# Patient Record
Sex: FEMALE | URBAN - METROPOLITAN AREA
[De-identification: names, ages, dates, MRNs, and addresses within clinical notes are randomized per-mention and may not be internally consistent; named-entity substitution may affect disease eponyms.]

---

## 2019-06-11 LAB
EXT 24 HR UR METANEPHRINE: ABNORMAL
EXT 24 HR UR NORMETANEPHRINE: ABNORMAL
EXT 24 HR UR NORMETANEPHRINE: ABNORMAL
EXT 25 HYDROXY VIT D2: ABNORMAL
EXT 25 HYDROXY VIT D3: ABNORMAL
EXT 5 HIAA 24 HR URINE: ABNORMAL
EXT 5 HIAA BLOOD: ABNORMAL
EXT ACTH: ABNORMAL
EXT AFP: ABNORMAL
EXT ALBUMIN: 4.5 G/DL (ref 3.4–5)
EXT ALKALINE PHOSPHATASE: 155 IU/L (ref 15–37)
EXT ALT: 16 IU/L (ref 14–59)
EXT AMYLASE: 75 IU/L (ref 25–115)
EXT ANTI ISLET CELL AB: ABNORMAL
EXT ANTI PARIETAL CELL AB: ABNORMAL
EXT ANTI THYROID AB: ABNORMAL
EXT AST: 16 IU/L (ref 15–37)
EXT BILIRUBIN DIRECT: ABNORMAL MG/DL
EXT BILIRUBIN TOTAL: 0.27 MG/DL (ref 0.2–1)
EXT BK VIRUS DNA QN PCR: ABNORMAL
EXT BUN: 18 MG/DL (ref 7–18)
EXT C PEPTIDE: ABNORMAL
EXT CA 125: ABNORMAL
EXT CA 19-9: ABNORMAL
EXT CA 27-29: ABNORMAL
EXT CALCITONIN: ABNORMAL
EXT CALCIUM: 9.7 MG/DL (ref 8.5–10.1)
EXT CEA: ABNORMAL
EXT CHLORIDE: 104 MEQ/L (ref 98–107)
EXT CHOLESTEROL: ABNORMAL
EXT CHROMOGRANIN A: ABNORMAL
EXT CO2: 16.8 MEQ/L (ref 21–32)
EXT CREATININE UA: ABNORMAL
EXT CREATININE: 0.9 MG/DL (ref 0.55–1.02)
EXT CYCLOSPORONE LEVEL: ABNORMAL
EXT DOPAMINE: ABNORMAL
EXT EBV DNA BY PCR: ABNORMAL
EXT EPINEPHRINE: ABNORMAL
EXT FOLATE: ABNORMAL
EXT FREE T3: ABNORMAL
EXT FREE T4: ABNORMAL
EXT FSH: ABNORMAL
EXT GASTRIN RELEASING PEPTIDE: ABNORMAL
EXT GASTRIN RELEASING PEPTIDE: ABNORMAL
EXT GASTRIN: ABNORMAL
EXT GGT: 4.1 G/DL (ref 1.2–2.3)
EXT GHRELIN: ABNORMAL
EXT GLUCAGON: ABNORMAL
EXT GLUCOSE: 167 MG/DL (ref 70–110)
EXT GROWTH HORMONE: ABNORMAL
EXT HCV RNA QUANT PCR: ABNORMAL
EXT HDL: ABNORMAL
EXT HEMATOCRIT: 37.7 % (ref 37–47)
EXT HEMOGLOBIN A1C: ABNORMAL %
EXT HEMOGLOBIN: 11.3 G/DL (ref 12–16)
EXT HISTAMINE 24 HR URINE: ABNORMAL
EXT HISTAMINE: ABNORMAL
EXT IGF-1: ABNORMAL
EXT IMMUNKNOW (NON-STIMULATED): ABNORMAL
EXT IMMUNKNOW (STIMULATED): ABNORMAL
EXT INR: ABNORMAL
EXT INSULIN: ABNORMAL
EXT LANREOTIDE LEVEL: ABNORMAL
EXT LDH, TOTAL: ABNORMAL
EXT LDL CHOLESTEROL: ABNORMAL
EXT LIPASE: 172 IU/L (ref 73–393)
EXT MAGNESIUM: ABNORMAL
EXT METANEPHRINE FREE PLASMA: ABNORMAL
EXT MOTILIN: ABNORMAL
EXT NEUROKININ A CAMB: ABNORMAL
EXT NEUROKININ A ISI: ABNORMAL
EXT NEUROTENSIN: ABNORMAL
EXT NOREPINEPHRINE: ABNORMAL
EXT NORMETANEPHRINE: ABNORMAL
EXT NSE: ABNORMAL
EXT OCTREOTIDE LEVEL: ABNORMAL
EXT PANCREASTATIN CAMB: ABNORMAL
EXT PANCREASTATIN ISI: ABNORMAL
EXT PANCREATIC POLYPEPTIDE: ABNORMAL
EXT PHOSPHORUS: ABNORMAL
EXT PLATELETS: 546 F/L (ref 8–12)
EXT POTASSIUM: 3.9 MEQ/L (ref 3.5–5.1)
EXT PROGRAF LEVEL: ABNORMAL
EXT PROLACTIN: ABNORMAL
EXT PROTEIN TOTAL: 8.6 G/DL (ref 6.4–8.2)
EXT PROTEIN UA: ABNORMAL
EXT PT: ABNORMAL
EXT PTH, INTACT: ABNORMAL
EXT PTT: ABNORMAL
EXT RAPAMUNE LEVEL: ABNORMAL
EXT SEROTONIN: ABNORMAL
EXT SODIUM: 138 MEQ/L (ref 136–145)
EXT SOMATOSTATIN: ABNORMAL
EXT SUBSTANCE P: ABNORMAL
EXT TRIGLYCERIDES: ABNORMAL
EXT TRYPTASE: ABNORMAL
EXT TSH: ABNORMAL
EXT URIC ACID: ABNORMAL
EXT URINE AMYLASE U/HR: ABNORMAL
EXT URINE AMYLASE U/L: ABNORMAL
EXT VASOACTIVE INTESTINAL POLYPEPTIDE: ABNORMAL
EXT VITAMIN B12: ABNORMAL
EXT VMA 24 HR URINE: ABNORMAL
EXT WBC: 13.97 K/UL (ref 4.8–10.8)
NEURON SPECIFIC ENOLASE: ABNORMAL

## 2019-07-30 ENCOUNTER — TELEPHONE (OUTPATIENT)
Dept: NEUROLOGY | Facility: HOSPITAL | Age: 44
End: 2019-07-30

## 2019-07-30 DIAGNOSIS — R10.84 GENERALIZED ABDOMINAL PAIN: ICD-10-CM

## 2019-07-30 NOTE — TELEPHONE ENCOUNTER
Contacted pt via phone after MD contacted Dr Fuchs directly. Pt with hx of gastric bypass, has jejunostomy in place, with continued absorption issues and bowel necrosis. Seeking surgical consult to evaluate, repair bowel. Pt desires tx, lives approx 4 hours away and is willing to travel for MRI and appt. Pt to fax operative reports and related records this afternoon or tomorrow.

## 2019-08-01 ENCOUNTER — TELEPHONE (OUTPATIENT)
Dept: NEUROLOGY | Facility: HOSPITAL | Age: 44
End: 2019-08-01

## 2019-08-01 DIAGNOSIS — R10.84 GENERALIZED ABDOMINAL PAIN: Primary | ICD-10-CM

## 2019-08-02 ENCOUNTER — HOSPITAL ENCOUNTER (OUTPATIENT)
Dept: RADIOLOGY | Facility: HOSPITAL | Age: 44
Discharge: HOME | End: 2019-08-02
Attending: SURGERY
Payer: COMMERCIAL

## 2019-08-02 ENCOUNTER — INFUSION (OUTPATIENT)
Dept: INFUSION THERAPY | Facility: HOSPITAL | Age: 44
End: 2019-08-02
Attending: SURGERY
Payer: COMMERCIAL

## 2019-08-02 DIAGNOSIS — R10.84 GENERALIZED ABDOMINAL PAIN: ICD-10-CM

## 2019-08-02 LAB
CREAT SERPL-MCNC: 0.6 MG/DL (ref 0.5–1.4)
EST. GFR  (AFRICAN AMERICAN): >60 ML/MIN/1.73 M^2
EST. GFR  (NON AFRICAN AMERICAN): >60 ML/MIN/1.73 M^2

## 2019-08-02 PROCEDURE — 72197 MRI PELVIS W/O & W/DYE: CPT | Mod: TC

## 2019-08-02 PROCEDURE — 74183 MRI ENTEROGRAPHY: ICD-10-PCS | Mod: 26,,, | Performed by: RADIOLOGY

## 2019-08-02 PROCEDURE — 25000003 PHARM REV CODE 250: Performed by: SURGERY

## 2019-08-02 PROCEDURE — 82565 ASSAY OF CREATININE: CPT

## 2019-08-02 PROCEDURE — A4216 STERILE WATER/SALINE, 10 ML: HCPCS | Performed by: SURGERY

## 2019-08-02 PROCEDURE — 36592 COLLECT BLOOD FROM PICC: CPT

## 2019-08-02 PROCEDURE — 72197 MRI PELVIS W/O & W/DYE: CPT | Mod: 26,,, | Performed by: RADIOLOGY

## 2019-08-02 PROCEDURE — 72197 MRI ENTEROGRAPHY: ICD-10-PCS | Mod: 26,,, | Performed by: RADIOLOGY

## 2019-08-02 PROCEDURE — 74183 MRI ABD W/O CNTR FLWD CNTR: CPT | Mod: 26,,, | Performed by: RADIOLOGY

## 2019-08-02 PROCEDURE — A9585 GADOBUTROL INJECTION: HCPCS | Performed by: SURGERY

## 2019-08-02 PROCEDURE — 25500020 PHARM REV CODE 255: Performed by: SURGERY

## 2019-08-02 RX ORDER — SODIUM CHLORIDE 0.9 % (FLUSH) 0.9 %
10 SYRINGE (ML) INJECTION
Status: DISCONTINUED | OUTPATIENT
Start: 2019-08-02 | End: 2019-08-02 | Stop reason: HOSPADM

## 2019-08-02 RX ORDER — GADOBUTROL 604.72 MG/ML
10 INJECTION INTRAVENOUS
Status: COMPLETED | OUTPATIENT
Start: 2019-08-02 | End: 2019-08-02

## 2019-08-02 RX ADMIN — Medication 10 ML: at 03:08

## 2019-08-02 RX ADMIN — GADOBUTROL 10 ML: 604.72 INJECTION INTRAVENOUS at 04:08

## 2019-08-08 ENCOUNTER — DOCUMENTATION ONLY (OUTPATIENT)
Dept: NEUROLOGY | Facility: HOSPITAL | Age: 44
End: 2019-08-08

## 2019-08-08 ENCOUNTER — HOSPITAL ENCOUNTER (INPATIENT)
Facility: HOSPITAL | Age: 44
LOS: 24 days | Discharge: HOME HEALTH | DRG: 264 | End: 2019-09-01
Attending: SURGERY | Admitting: SURGERY
Payer: COMMERCIAL

## 2019-08-08 DIAGNOSIS — E43 SEVERE PROTEIN-CALORIE MALNUTRITION: ICD-10-CM

## 2019-08-08 DIAGNOSIS — A41.51 SEPSIS DUE TO ESCHERICHIA COLI: ICD-10-CM

## 2019-08-08 DIAGNOSIS — B96.20 BACTEREMIA DUE TO ESCHERICHIA COLI: ICD-10-CM

## 2019-08-08 DIAGNOSIS — R78.81 GRAM-NEGATIVE BACTEREMIA: ICD-10-CM

## 2019-08-08 DIAGNOSIS — R19.7 DIARRHEA, UNSPECIFIED TYPE: ICD-10-CM

## 2019-08-08 DIAGNOSIS — R78.81 BACTEREMIA DUE TO ESCHERICHIA COLI: ICD-10-CM
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
## 2019-08-08 DIAGNOSIS — A41.9 SEPSIS, DUE TO UNSPECIFIED ORGANISM: ICD-10-CM

## 2019-08-08 DIAGNOSIS — R07.9 ACUTE CHEST PAIN: ICD-10-CM

## 2019-08-08 DIAGNOSIS — R10.84 GENERALIZED ABDOMINAL PAIN: ICD-10-CM

## 2019-08-08 DIAGNOSIS — A41.9 SEPSIS: ICD-10-CM

## 2019-08-08 DIAGNOSIS — K91.89 COMPLICATIONS OF GASTRIC BYPASS SURGERY: Primary | ICD-10-CM

## 2019-08-08 DIAGNOSIS — K90.829 SGS (SHORT GUT SYNDROME): ICD-10-CM

## 2019-08-08 PROBLEM — E46 PROTEIN CALORIE MALNUTRITION: Status: ACTIVE | Noted: 2019-08-08

## 2019-08-08 LAB
ALBUMIN SERPL BCP-MCNC: 1.9 G/DL (ref 3.5–5.2)
ALP SERPL-CCNC: 207 U/L (ref 55–135)
ALT SERPL W/O P-5'-P-CCNC: 38 U/L (ref 10–44)
ANION GAP SERPL CALC-SCNC: 8 MMOL/L (ref 8–16)
AST SERPL-CCNC: 23 U/L (ref 10–40)
BASOPHILS # BLD AUTO: 0.02 K/UL (ref 0–0.2)
BASOPHILS NFR BLD: 0.3 % (ref 0–1.9)
BILIRUB SERPL-MCNC: 0.2 MG/DL (ref 0.1–1)
BUN SERPL-MCNC: 10 MG/DL (ref 6–20)
CALCIUM SERPL-MCNC: 8.1 MG/DL (ref 8.7–10.5)
CHLORIDE SERPL-SCNC: 97 MMOL/L (ref 95–110)
CO2 SERPL-SCNC: 29 MMOL/L (ref 23–29)
CREAT SERPL-MCNC: 0.6 MG/DL (ref 0.5–1.4)
DIFFERENTIAL METHOD: ABNORMAL
EOSINOPHIL # BLD AUTO: 0.3 K/UL (ref 0–0.5)
EOSINOPHIL NFR BLD: 4.3 % (ref 0–8)
ERYTHROCYTE [DISTWIDTH] IN BLOOD BY AUTOMATED COUNT: 16.4 % (ref 11.5–14.5)
EST. GFR  (AFRICAN AMERICAN): >60 ML/MIN/1.73 M^2
EST. GFR  (NON AFRICAN AMERICAN): >60 ML/MIN/1.73 M^2
GLUCOSE SERPL-MCNC: 94 MG/DL (ref 70–110)
HCT VFR BLD AUTO: 25.5 % (ref 37–48.5)
HGB BLD-MCNC: 7.7 G/DL (ref 12–16)
LYMPHOCYTES # BLD AUTO: 1.7 K/UL (ref 1–4.8)
LYMPHOCYTES NFR BLD: 22.2 % (ref 18–48)
MAGNESIUM SERPL-MCNC: 2 MG/DL (ref 1.6–2.6)
MCH RBC QN AUTO: 22.8 PG (ref 27–31)
MCHC RBC AUTO-ENTMCNC: 30.2 G/DL (ref 32–36)
MCV RBC AUTO: 75 FL (ref 82–98)
MONOCYTES # BLD AUTO: 0.8 K/UL (ref 0.3–1)
MONOCYTES NFR BLD: 10.7 % (ref 4–15)
NEUTROPHILS # BLD AUTO: 4.6 K/UL (ref 1.8–7.7)
NEUTROPHILS NFR BLD: 62.5 % (ref 38–73)
PHOSPHATE SERPL-MCNC: 2.8 MG/DL (ref 2.7–4.5)
PLATELET # BLD AUTO: 473 K/UL (ref 150–350)
PMV BLD AUTO: 8.6 FL (ref 9.2–12.9)
POTASSIUM SERPL-SCNC: 3.4 MMOL/L (ref 3.5–5.1)
PREALB SERPL-MCNC: 13 MG/DL (ref 20–43)
PROT SERPL-MCNC: 6 G/DL (ref 6–8.4)
RBC # BLD AUTO: 3.38 M/UL (ref 4–5.4)
SODIUM SERPL-SCNC: 134 MMOL/L (ref 136–145)
WBC # BLD AUTO: 7.47 K/UL (ref 3.9–12.7)

## 2019-08-08 PROCEDURE — 63600175 PHARM REV CODE 636 W HCPCS: Performed by: STUDENT IN AN ORGANIZED HEALTH CARE EDUCATION/TRAINING PROGRAM

## 2019-08-08 PROCEDURE — 83735 ASSAY OF MAGNESIUM: CPT

## 2019-08-08 PROCEDURE — 84100 ASSAY OF PHOSPHORUS: CPT

## 2019-08-08 PROCEDURE — 85025 COMPLETE CBC W/AUTO DIFF WBC: CPT

## 2019-08-08 PROCEDURE — 84134 ASSAY OF PREALBUMIN: CPT

## 2019-08-08 PROCEDURE — 11000001 HC ACUTE MED/SURG PRIVATE ROOM

## 2019-08-08 PROCEDURE — 94761 N-INVAS EAR/PLS OXIMETRY MLT: CPT

## 2019-08-08 PROCEDURE — 80053 COMPREHEN METABOLIC PANEL: CPT

## 2019-08-08 RX ORDER — LANOLIN ALCOHOL/MO/W.PET/CERES
1000 CREAM (GRAM) TOPICAL DAILY
COMMUNITY

## 2019-08-08 RX ORDER — ACYCLOVIR 400 MG/1
400 TABLET ORAL 2 TIMES DAILY PRN
COMMUNITY

## 2019-08-08 RX ORDER — TRAMADOL HYDROCHLORIDE 50 MG/1
50 TABLET ORAL EVERY 6 HOURS PRN
Status: DISCONTINUED | OUTPATIENT
Start: 2019-08-08 | End: 2019-08-21

## 2019-08-08 RX ORDER — SODIUM CHLORIDE 0.9 % (FLUSH) 0.9 %
10 SYRINGE (ML) INJECTION
Status: DISCONTINUED | OUTPATIENT
Start: 2019-08-08 | End: 2019-09-01 | Stop reason: HOSPADM

## 2019-08-08 RX ORDER — ALUMINUM HYDROXIDE, MAGNESIUM HYDROXIDE, AND SIMETHICONE 2400; 240; 2400 MG/30ML; MG/30ML; MG/30ML
10 SUSPENSION ORAL EVERY 6 HOURS PRN
COMMUNITY

## 2019-08-08 RX ORDER — ACETAMINOPHEN 325 MG/1
650 TABLET ORAL EVERY 4 HOURS PRN
Status: DISCONTINUED | OUTPATIENT
Start: 2019-08-08 | End: 2019-08-24

## 2019-08-08 RX ORDER — LIDOCAINE HYDROCHLORIDE 10 MG/ML
1 INJECTION, SOLUTION EPIDURAL; INFILTRATION; INTRACAUDAL; PERINEURAL ONCE
Status: DISCONTINUED | OUTPATIENT
Start: 2019-08-08 | End: 2019-08-11

## 2019-08-08 RX ORDER — OXYCODONE HYDROCHLORIDE 5 MG/1
10 TABLET ORAL EVERY 4 HOURS PRN
Status: DISCONTINUED | OUTPATIENT
Start: 2019-08-08 | End: 2019-08-21

## 2019-08-08 RX ORDER — ACETAMINOPHEN 500 MG
1000 TABLET ORAL EVERY 6 HOURS PRN
COMMUNITY

## 2019-08-08 RX ORDER — ONDANSETRON HYDROCHLORIDE 8 MG/1
8 TABLET, FILM COATED ORAL 2 TIMES DAILY PRN
COMMUNITY

## 2019-08-08 RX ORDER — MORPHINE SULFATE 2 MG/ML
4 INJECTION, SOLUTION INTRAMUSCULAR; INTRAVENOUS EVERY 4 HOURS PRN
Status: DISCONTINUED | OUTPATIENT
Start: 2019-08-08 | End: 2019-08-08

## 2019-08-08 RX ORDER — HYDROMORPHONE HYDROCHLORIDE 2 MG/ML
1 INJECTION, SOLUTION INTRAMUSCULAR; INTRAVENOUS; SUBCUTANEOUS EVERY 4 HOURS PRN
Status: DISCONTINUED | OUTPATIENT
Start: 2019-08-08 | End: 2019-08-21

## 2019-08-08 RX ORDER — SUCRALFATE 1 G/10ML
1 SUSPENSION ORAL
COMMUNITY

## 2019-08-08 RX ORDER — ONDANSETRON 8 MG/1
8 TABLET, ORALLY DISINTEGRATING ORAL EVERY 8 HOURS PRN
Status: DISCONTINUED | OUTPATIENT
Start: 2019-08-08 | End: 2019-08-19

## 2019-08-08 RX ORDER — ACETAMINOPHEN 325 MG/1
650 TABLET ORAL EVERY 8 HOURS PRN
Status: DISCONTINUED | OUTPATIENT
Start: 2019-08-08 | End: 2019-08-24

## 2019-08-08 RX ORDER — CHLORDIAZEPOXIDE HYDROCHLORIDE AND CLIDINIUM BROMIDE 5; 2.5 MG/1; MG/1
1 CAPSULE ORAL
COMMUNITY

## 2019-08-08 RX ORDER — SODIUM CHLORIDE 9 MG/ML
INJECTION, SOLUTION INTRAVENOUS CONTINUOUS
Status: DISCONTINUED | OUTPATIENT
Start: 2019-08-08 | End: 2019-08-11

## 2019-08-08 RX ORDER — ZOLPIDEM TARTRATE 12.5 MG/1
12.5 TABLET, FILM COATED, EXTENDED RELEASE ORAL NIGHTLY
COMMUNITY

## 2019-08-08 RX ADMIN — SODIUM CHLORIDE: 0.9 INJECTION, SOLUTION INTRAVENOUS at 05:08

## 2019-08-08 RX ADMIN — HYDROMORPHONE HYDROCHLORIDE 1 MG: 2 INJECTION INTRAMUSCULAR; INTRAVENOUS; SUBCUTANEOUS at 07:08

## 2019-08-08 NOTE — NURSING
Referral from Dr. Lombardi & Dr. Mayes. Patient with hx of eddie-en-Y procedure for weight loss in 2006.  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  Patient has jejunostomy in place, with continued absorption issues and bowel necrosis. Seeking surgical consult to evaluate & repair bowel.  Last CT scan, 7/2019 from outside system.  MRI 8/2/19 at [facility].

## 2019-08-08 NOTE — NURSING
Dionisio from Lab at [facility] called to advise pt had blood cultures come back positive with gram negative rods. Dionisio sent over report; placed in chart.

## 2019-08-08 NOTE — H&P
[facility]  General Surgery  Neuroendocrine Tumor Service  History & Physical    Patient Name: Parvin Corbin  MRN: [id number]   Admission Date: 2019  Code Status: Full Code   Attending Provider: KANDIS Fuchs MD   Primary Care Physician: Cecilia Ross MD  Principal Problem: sepsis    Patient information was obtained from patient.     Subjective:     History of Present Illness: 43 y.o. woman transferred from OSH with abdominal pain and sepsis (GNR). 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  Hospital stay complicated by bilateral pulmonary embolisms. Patient reports having no issues with gastric bypass until episode of abdominal pain and resulting surgery last month. She now reports having a few days of abdominal pain with associated nausea/vomiting, fever/chills. Patient has been on TPN at home since discharge from hospital last month via PICC line. E PICC line removed yesterday due to suspected sepsis and new PICC placed to [facility].     Chief Complaint/Reason for Admission: sepsis, vomiting    No current facility-administered medications on file prior to encounter.      No current outpatient medications on file prior to encounter.       Review of patient's allergies indicates:  No Known Allergies    Past Medical History:   Diagnosis Date    ADD (attention deficit disorder)     Anxiety     Diabetes     Insomnia      Past Surgical History:   Procedure Laterality Date    CHOLECYSTECTOMY      ENDOMETRIAL ABLATION      EXPLORATORY LAPAROTOMY  2019    SBR -ischemic injury    GASTRIC BYPASS  2006    [place]  DELIVERY ONLY      , Low Cervical    TUBAL LIGATION       Family History     None        Tobacco Use    Smoking status: Never Smoker   Substance and Sexual Activity    Alcohol use:  Not Currently    Drug use: Never    Sexual activity: Not Currently     Review of Systems   Constitutional: Positive for appetite change and fever. Negative for chills.   HENT: Negative.  Negative for congestion, nosebleeds, rhinorrhea, sneezing, sore throat and trouble swallowing.    Eyes: Negative.    Respiratory: Negative.  Negative for cough, choking, chest tightness, shortness of breath and wheezing.    Cardiovascular: Negative.  Negative for chest pain and leg swelling.   Gastrointestinal: Positive for abdominal pain and vomiting. Negative for abdominal distention and blood in stool.   Genitourinary: Negative for dysuria.   Musculoskeletal: Negative for back pain and neck pain.   Neurological: Negative.  Negative for dizziness, syncope, weakness and headaches.   All other systems reviewed and are negative.    Objective:     Vital Signs (Most Recent):  Temp: 98 °F (36.7 °C) (08/08/19 1830)  Pulse: 100 (08/08/19 1830)  Resp: 18 (08/08/19 1830)  BP: 97/62 (08/08/19 1830)  SpO2: 98 % (08/08/19 1830) Vital Signs (24h Range):  Temp:  [98 °F (36.7 °C)-98.4 °F (36.9 °C)] 98 °F (36.7 °C)  Pulse:  [] 100  Resp:  [18] 18  SpO2:  [98 %] 98 %  BP: ()/(62-64) 97/62     Weight: 76.1 kg (167 lb 12.3 oz)  Body mass index is 27.92 kg/m².        Physical Exam   Constitutional: She is oriented to person, place, and time. She appears well-developed and well-nourished. No distress.   HENT:   Head: Atraumatic.   Mouth/Throat: No oropharyngeal exudate.   Eyes: Pupils are equal, round, and reactive to light. Conjunctivae are normal. No scleral icterus.   Neck: Neck supple. No JVD present. No thyromegaly present.   Cardiovascular: Normal rate, regular rhythm and intact distal pulses.   Pulmonary/Chest: Effort normal. No respiratory distress. She has no wheezes.   Abdominal: Soft. She exhibits no distension and no mass. There is tenderness. There is no guarding.   Jejunostomy to LUQ. Surrounding skin with superficial  erythema/irritation 2/2 stool exposure   Musculoskeletal: Normal range of motion. She exhibits no edema.   Neurological: She is alert and oriented to person, place, and time.   Skin: Skin is dry. Capillary refill takes less than 2 seconds. She is not diaphoretic.   Nursing note and vitals reviewed.    Significant Labs:  CBC:   Pending    CMP:   Pending     Assessment/Plan:     Active Diagnoses:    Diagnosis Date Noted POA    Sepsis [A41.9] 08/08/2019 Yes      Problems Resolved During this Admission:     · Transferring hospital lab reported blood cultures with gram negative rods, will start patient on zosyn  · CBC, CMP, Mg, PO4, prealbumin  · XR enteric contrast study with small bowel follow through tomorrow AM  · Diabetic diet, NPO at midnight  · Pain/nausea control PRN  · MIVF @ 125cc  · Restart home medications  · Hold TPN at this time due to sepsis    Patient seen and examined with attending surgeon Dr. Kirit De Los Snatos MD  General Surgery  Neuroendocrine Tumor Service  [facility]    Seen and examined  History reviewed  Physical done  S/p bowel resection for volvulus   May have short gut ssyndrome    Will evlauate

## 2019-08-08 NOTE — NURSING
Pt arrived to floor; vital signs taken and recorded. Patient and spouse oriented to room. Patient with complaints of pain. Advised her I would have to wait until they place orders.

## 2019-08-09 LAB
ALBUMIN SERPL BCP-MCNC: 1.8 G/DL (ref 3.5–5.2)
ALP SERPL-CCNC: 183 U/L (ref 55–135)
ALT SERPL W/O P-5'-P-CCNC: 34 U/L (ref 10–44)
ANION GAP SERPL CALC-SCNC: 7 MMOL/L (ref 8–16)
AST SERPL-CCNC: 23 U/L (ref 10–40)
BASOPHILS # BLD AUTO: 0.01 K/UL (ref 0–0.2)
BASOPHILS NFR BLD: 0.2 % (ref 0–1.9)
BILIRUB SERPL-MCNC: 0.2 MG/DL (ref 0.1–1)
BILIRUB UR QL STRIP: NEGATIVE
BUN SERPL-MCNC: 10 MG/DL (ref 6–20)
CALCIUM SERPL-MCNC: 7.7 MG/DL (ref 8.7–10.5)
CHLORIDE SERPL-SCNC: 100 MMOL/L (ref 95–110)
CLARITY UR: CLEAR
CO2 SERPL-SCNC: 28 MMOL/L (ref 23–29)
COLOR UR: YELLOW
CREAT SERPL-MCNC: 0.6 MG/DL (ref 0.5–1.4)
DIFFERENTIAL METHOD: ABNORMAL
EOSINOPHIL # BLD AUTO: 0.3 K/UL (ref 0–0.5)
EOSINOPHIL NFR BLD: 5 % (ref 0–8)
ERYTHROCYTE [DISTWIDTH] IN BLOOD BY AUTOMATED COUNT: 16.4 % (ref 11.5–14.5)
EST. GFR  (AFRICAN AMERICAN): >60 ML/MIN/1.73 M^2
EST. GFR  (NON AFRICAN AMERICAN): >60 ML/MIN/1.73 M^2
GLUCOSE SERPL-MCNC: 79 MG/DL (ref 70–110)
GLUCOSE UR QL STRIP: NEGATIVE
HCT VFR BLD AUTO: 24 % (ref 37–48.5)
HGB BLD-MCNC: 7.2 G/DL (ref 12–16)
HGB UR QL STRIP: NEGATIVE
KETONES UR QL STRIP: NEGATIVE
LEUKOCYTE ESTERASE UR QL STRIP: NEGATIVE
LYMPHOCYTES # BLD AUTO: 1.7 K/UL (ref 1–4.8)
LYMPHOCYTES NFR BLD: 26.7 % (ref 18–48)
MAGNESIUM SERPL-MCNC: 1.8 MG/DL (ref 1.6–2.6)
MCH RBC QN AUTO: 22.6 PG (ref 27–31)
MCHC RBC AUTO-ENTMCNC: 30 G/DL (ref 32–36)
MCV RBC AUTO: 76 FL (ref 82–98)
MONOCYTES # BLD AUTO: 0.8 K/UL (ref 0.3–1)
MONOCYTES NFR BLD: 12.6 % (ref 4–15)
NEUTROPHILS # BLD AUTO: 3.4 K/UL (ref 1.8–7.7)
NEUTROPHILS NFR BLD: 55.5 % (ref 38–73)
NITRITE UR QL STRIP: NEGATIVE
PH UR STRIP: 6 [PH] (ref 5–8)
PHOSPHATE SERPL-MCNC: 3.5 MG/DL (ref 2.7–4.5)
PLATELET # BLD AUTO: 456 K/UL (ref 150–350)
PMV BLD AUTO: 8.4 FL (ref 9.2–12.9)
POCT GLUCOSE: 120 MG/DL (ref 70–110)
POTASSIUM SERPL-SCNC: 3.8 MMOL/L (ref 3.5–5.1)
PROT SERPL-MCNC: 5.6 G/DL (ref 6–8.4)
PROT UR QL STRIP: NEGATIVE
RBC # BLD AUTO: 3.18 M/UL (ref 4–5.4)
SODIUM SERPL-SCNC: 135 MMOL/L (ref 136–145)
SP GR UR STRIP: <=1.005 (ref 1–1.03)
URN SPEC COLLECT METH UR: ABNORMAL
UROBILINOGEN UR STRIP-ACNC: NEGATIVE EU/DL
WBC # BLD AUTO: 6.18 K/UL (ref 3.9–12.7)

## 2019-08-09 PROCEDURE — 84100 ASSAY OF PHOSPHORUS: CPT

## 2019-08-09 PROCEDURE — 63600175 PHARM REV CODE 636 W HCPCS: Performed by: STUDENT IN AN ORGANIZED HEALTH CARE EDUCATION/TRAINING PROGRAM

## 2019-08-09 PROCEDURE — 81003 URINALYSIS AUTO W/O SCOPE: CPT

## 2019-08-09 PROCEDURE — 80053 COMPREHEN METABOLIC PANEL: CPT

## 2019-08-09 PROCEDURE — 25000003 PHARM REV CODE 250: Performed by: STUDENT IN AN ORGANIZED HEALTH CARE EDUCATION/TRAINING PROGRAM

## 2019-08-09 PROCEDURE — 94761 N-INVAS EAR/PLS OXIMETRY MLT: CPT

## 2019-08-09 PROCEDURE — 25000003 PHARM REV CODE 250: Performed by: SURGERY

## 2019-08-09 PROCEDURE — 83735 ASSAY OF MAGNESIUM: CPT

## 2019-08-09 PROCEDURE — S0028 INJECTION, FAMOTIDINE, 20 MG: HCPCS | Performed by: STUDENT IN AN ORGANIZED HEALTH CARE EDUCATION/TRAINING PROGRAM

## 2019-08-09 PROCEDURE — 11000001 HC ACUTE MED/SURG PRIVATE ROOM

## 2019-08-09 PROCEDURE — 85025 COMPLETE CBC W/AUTO DIFF WBC: CPT

## 2019-08-09 RX ORDER — HYDROCODONE BITARTRATE AND ACETAMINOPHEN 10; 325 MG/1; MG/1
1 TABLET ORAL EVERY 6 HOURS PRN
COMMUNITY

## 2019-08-09 RX ORDER — FAMOTIDINE 10 MG/ML
20 INJECTION INTRAVENOUS 2 TIMES DAILY
Status: DISCONTINUED | OUTPATIENT
Start: 2019-08-09 | End: 2019-08-15

## 2019-08-09 RX ORDER — ZOLPIDEM TARTRATE 5 MG/1
5 TABLET ORAL NIGHTLY PRN
Status: DISCONTINUED | OUTPATIENT
Start: 2019-08-09 | End: 2019-09-01 | Stop reason: HOSPADM

## 2019-08-09 RX ORDER — ENOXAPARIN SODIUM 100 MG/ML
50 INJECTION SUBCUTANEOUS EVERY 12 HOURS
Status: DISCONTINUED | OUTPATIENT
Start: 2019-08-09 | End: 2019-08-14

## 2019-08-09 RX ORDER — ACYCLOVIR 200 MG/1
400 CAPSULE ORAL 2 TIMES DAILY
Status: DISCONTINUED | OUTPATIENT
Start: 2019-08-09 | End: 2019-08-14

## 2019-08-09 RX ORDER — FAMOTIDINE 20 MG/1
20 TABLET, FILM COATED ORAL 2 TIMES DAILY
Status: DISCONTINUED | OUTPATIENT
Start: 2019-08-09 | End: 2019-08-09

## 2019-08-09 RX ORDER — ACYCLOVIR 400 MG/1
400 TABLET ORAL 2 TIMES DAILY
Status: DISCONTINUED | OUTPATIENT
Start: 2019-08-09 | End: 2019-08-09 | Stop reason: RX

## 2019-08-09 RX ORDER — OXANDROLONE 2.5 MG/1
2.5 TABLET ORAL 2 TIMES DAILY
Status: DISCONTINUED | OUTPATIENT
Start: 2019-08-09 | End: 2019-08-10

## 2019-08-09 RX ORDER — SUCRALFATE 1 G/10ML
1 SUSPENSION ORAL
Status: DISCONTINUED | OUTPATIENT
Start: 2019-08-09 | End: 2019-08-28

## 2019-08-09 RX ADMIN — FAMOTIDINE 20 MG: 10 INJECTION, SOLUTION INTRAVENOUS at 08:08

## 2019-08-09 RX ADMIN — OXYCODONE HYDROCHLORIDE 10 MG: 5 TABLET ORAL at 08:08

## 2019-08-09 RX ADMIN — FAMOTIDINE 20 MG: 10 INJECTION, SOLUTION INTRAVENOUS at 12:08

## 2019-08-09 RX ADMIN — ENOXAPARIN SODIUM 50 MG: 100 INJECTION SUBCUTANEOUS at 08:08

## 2019-08-09 RX ADMIN — ASCORBIC ACID, VITAMIN A PALMITATE, CHOLECALCIFEROL, THIAMINE HYDROCHLORIDE, RIBOFLAVIN-5 PHOSPHATE SODIUM, PYRIDOXINE HYDROCHLORIDE, NIACINAMIDE, DEXPANTHENOL, ALPHA-TOCOPHEROL ACETATE, VITAMIN K1, FOLIC ACID, BIOTIN, CYANOCOBALAMIN: 200; 3300; 200; 6; 3.6; 6; 40; 15; 10; 150; 600; 60; 5 INJECTION, SOLUTION INTRAVENOUS at 07:08

## 2019-08-09 RX ADMIN — ZOLPIDEM TARTRATE 5 MG: 5 TABLET ORAL at 08:08

## 2019-08-09 RX ADMIN — TRAMADOL HYDROCHLORIDE 50 MG: 50 TABLET, FILM COATED ORAL at 02:08

## 2019-08-09 RX ADMIN — HYDROMORPHONE HYDROCHLORIDE 1 MG: 2 INJECTION INTRAMUSCULAR; INTRAVENOUS; SUBCUTANEOUS at 02:08

## 2019-08-09 RX ADMIN — OXYCODONE HYDROCHLORIDE 10 MG: 5 TABLET ORAL at 06:08

## 2019-08-09 RX ADMIN — SUCRALFATE 1 G: 1 SUSPENSION ORAL at 12:08

## 2019-08-09 RX ADMIN — PIPERACILLIN AND TAZOBACTAM 4.5 G: 4; .5 INJECTION, POWDER, LYOPHILIZED, FOR SOLUTION INTRAVENOUS; PARENTERAL at 06:08

## 2019-08-09 RX ADMIN — SUCRALFATE 1 G: 1 SUSPENSION ORAL at 05:08

## 2019-08-09 RX ADMIN — HYDROMORPHONE HYDROCHLORIDE 1 MG: 2 INJECTION INTRAMUSCULAR; INTRAVENOUS; SUBCUTANEOUS at 09:08

## 2019-08-09 RX ADMIN — PIPERACILLIN AND TAZOBACTAM 4.5 G: 4; .5 INJECTION, POWDER, LYOPHILIZED, FOR SOLUTION INTRAVENOUS; PARENTERAL at 09:08

## 2019-08-09 RX ADMIN — SODIUM CHLORIDE: 0.9 INJECTION, SOLUTION INTRAVENOUS at 04:08

## 2019-08-09 RX ADMIN — HYDROMORPHONE HYDROCHLORIDE 1 MG: 2 INJECTION INTRAMUSCULAR; INTRAVENOUS; SUBCUTANEOUS at 12:08

## 2019-08-09 RX ADMIN — SUCRALFATE 1 G: 1 SUSPENSION ORAL at 08:08

## 2019-08-09 RX ADMIN — OXANDROLONE 2.5 MG: 2.5 TABLET ORAL at 08:08

## 2019-08-09 RX ADMIN — ACYCLOVIR 400 MG: 200 CAPSULE ORAL at 08:08

## 2019-08-09 RX ADMIN — ACYCLOVIR 400 MG: 200 CAPSULE ORAL at 12:08

## 2019-08-09 RX ADMIN — SODIUM CHLORIDE: 0.9 INJECTION, SOLUTION INTRAVENOUS at 01:08

## 2019-08-09 RX ADMIN — TRAMADOL HYDROCHLORIDE 50 MG: 50 TABLET, FILM COATED ORAL at 11:08

## 2019-08-09 NOTE — PLAN OF CARE
Problem: Adult Inpatient Plan of Care  Goal: Plan of Care Review  Outcome: Ongoing (interventions implemented as appropriate)  POC discussed with patient. NAD noted, patient c/o pain treated with prn with good effect. Patient self care with colostomy, recording amounts of output per patient report. IV fluids continued. IV medications administered. Patient NPO since midnight. Expected EGD for today. Patient encouraged to use call light to voice any needs. Will continue to monitor.

## 2019-08-09 NOTE — PROGRESS NOTES
Spoke with Mirian at [facility] to get pathology report sent to this facility.  Mirian stated she will fax the paperwork now.

## 2019-08-09 NOTE — PROGRESS NOTES
Pharmacy New Medication Education    Patient and/or Caregiver ACCEPTED medication education.    Pharmacy has provided education on the name, indication, and possible side effects of the medication(s) prescribed, using teach-back method.     Learners of pharmacy medication education includes:  patient    Medication Indication Side Effects   hydromorphone pain excessive sedation, nausea and vomiting   Piperacillin/tazobactam infection nausea, vomiting and diarrhea       The following medications have also been discussed, during this admission.     Current Facility-Administered Medications   Medication Frequency    0.9%  NaCl infusion Continuous    acetaminophen tablet 650 mg Q8H PRN    acetaminophen tablet 650 mg Q4H PRN    acyclovir capsule 400 mg BID    famotidine (PF) injection 20 mg BID    hydromorphone (PF) injection 1 mg Q4H PRN    lidocaine (PF) 10 mg/ml (1%) injection 10 mg Once    ondansetron disintegrating tablet 8 mg Q8H PRN    oxyCODONE immediate release tablet 10 mg Q4H PRN    piperacillin-tazobactam 4.5 g in dextrose 5 % 100 mL IVPB (ready to mix system) Q8H    sodium chloride 0.9% flush 10 mL PRN    sucralfate 100 mg/mL suspension 1 g QID (WM & HS)    traMADol tablet 50 mg Q6H PRN    zolpidem tablet 5 mg Nightly PRN          Thank you  Linda Giles, DavidD

## 2019-08-09 NOTE — PLAN OF CARE
TN met with pt and  Fabiano   [phone]    transferred from [facility]   current with [facility] and [facility] for home TPN     Chavo with [facility] informed -- referral sent through [facility]      pt here with sepsis; pmh eddie - y gastric bypass 13 yrs ago.     abd pain, chills, fever n/v.        08/09/19 1510   Discharge Assessment   Assessment Type Discharge Planning Assessment   Confirmed/corrected address and phone number on facesheet? Yes   Assessment information obtained from? Patient;Medical Record   Expected Length of Stay (days) 5   Communicated expected length of stay with patient/caregiver yes   Prior to hospitilization cognitive status: Alert/Oriented   Prior to hospitalization functional status: Independent   Current cognitive status: Alert/Oriented   Current Functional Status: Independent   Lives With spouse   Able to Return to Prior Arrangements yes   Is patient able to care for self after discharge? Unable to determine at this time (comments)   Patient's perception of discharge disposition home or selfcare;home health  (current [facility] and [facility] [place] )   Readmission Within the Last 30 Days   (transferred from [facility]   )   Patient currently being followed by outpatient case management? No   Patient currently receives any other outside agency services? No   Do you have any problems affording any of your prescribed medications? TBD  ([facility] )   Is the patient taking medications as prescribed? yes   Does the patient have transportation home? Yes   Transportation Anticipated family or friend will provide   Discharge Plan A Home;Home with family

## 2019-08-09 NOTE — PROGRESS NOTES
[facility]  General Surgery  Neuroendocrine Tumor Service  Progress Note    Subjective:   Hospital course: 43 y.o. woman transferred from OSH with abdominal pain and sepsis (GNR). 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  Hospital stay complicated by bilateral pulmonary embolisms. Patient reports having no issues with gastric bypass until episode of abdominal pain and resulting surgery last month. She now reports having a few days of abdominal pain with associated nausea/vomiting, fever/chills. Patient has been on TPN at home since discharge from hospital last month via PICC line. RUE PICC line removed yesterday due to suspected sepsis and new PICC placed to [facility].       Interval History: PAPITO, reports abdominal pain slightly improved, no nausea/vomiting, chest pain, SOB    Medications:  Continuous Infusions:   sodium chloride 0.9% 125 mL/hr at 08/09/19 0100     Scheduled Meds:   acyclovir  400 mg Oral BID    famotidine  20 mg Oral BID    lidocaine (PF) 10 mg/ml (1%)  1 mL Other Once    piperacillin-tazobactam (ZOSYN) IVPB  4.5 g Intravenous Q8H    sucralfate  1 g Oral QID (WM & HS)     PRN Meds:acetaminophen, acetaminophen, HYDROmorphone, ondansetron, oxyCODONE, sodium chloride 0.9%, traMADol, zolpidem     Objective:     Vital Signs (Most Recent):  Temp: 98.5 °F (36.9 °C) (08/09/19 0745)  Pulse: 82 (08/09/19 0745)  Resp: 18 (08/09/19 0745)  BP: 97/64 (08/09/19 0745)  SpO2: 96 % (08/09/19 0500) Vital Signs (24h Range):  Temp:  [97.8 °F (36.6 °C)-98.5 °F (36.9 °C)] 98.5 °F (36.9 °C)  Pulse:  [] 82  Resp:  [18-19] 18  SpO2:  [96 %-99 %] 96 %  BP: ()/(55-65) 97/64       Physical Exam   Constitutional: She appears well-developed and well-nourished. No distress.   HENT:   Head: Normocephalic and atraumatic.    Eyes: Conjunctivae are normal.   Neck: Neck supple.   Cardiovascular: Normal rate, regular rhythm and intact distal pulses.   Pulmonary/Chest: Effort normal. No respiratory distress.   Abdominal: Soft. She exhibits no distension. There is no rebound and no guarding.   Jejunostomy to LUQ with gas/liquid stool to bag   Skin: She is not diaphoretic.   Nursing note and vitals reviewed.    Significant Labs:  CBC:   Recent Labs   Lab 08/09/19  0634   WBC 6.18   RBC 3.18*   HGB 7.2*   HCT 24.0*   *   MCV 76*   MCH 22.6*   MCHC 30.0*     CMP:   Recent Labs   Lab 08/09/19  0634   GLU 79   CALCIUM 7.7*   ALBUMIN 1.8*   PROT 5.6*   *   K 3.8   CO2 28      BUN 10   CREATININE 0.6   ALKPHOS 183*   ALT 34   AST 23   BILITOT 0.2   MG 1.8   PHOS 3.5     Recent Labs   Lab 08/08/19  2307   PREALBUMIN 13*     Assessment/Plan:         Active Diagnoses:    Diagnosis Date Noted POA    PRINCIPAL PROBLEM:  Sepsis [A41.9] 08/08/2019 Yes    Protein calorie malnutrition [E46] 08/08/2019 Yes    SGS (short gut syndrome) [K91.2]  Yes      Problems Resolved During this Admission:     · NPO this morning, will review previously obtained imaging and determine usefulness of additional imaging studies  · Pain/nausea control PRN  · Accurate documentation intake/output  · Zosyn for GNR blood cultures from transferring hospital  · Will hold off on starting TPN until antibiotic therapy yields negative cultures  · Current workup towards reconnection of current alimentary tract vs small intestine transplant    Myles De Los Santos MD   [employer] General Surgery, PGY-2  08/09/2019

## 2019-08-09 NOTE — PLAN OF CARE
Problem: Adult Inpatient Plan of Care  Goal: Plan of Care Review  Outcome: Ongoing (interventions implemented as appropriate)  Pt on RA with documented sats.99. Will continue to monitor.

## 2019-08-10 LAB
ALBUMIN SERPL BCP-MCNC: 2 G/DL (ref 3.5–5.2)
ALP SERPL-CCNC: 182 U/L (ref 55–135)
ALT SERPL W/O P-5'-P-CCNC: 24 U/L (ref 10–44)
ANION GAP SERPL CALC-SCNC: 7 MMOL/L (ref 8–16)
ANISOCYTOSIS BLD QL SMEAR: SLIGHT
AST SERPL-CCNC: 13 U/L (ref 10–40)
B-HCG UR QL: NEGATIVE
BASOPHILS # BLD AUTO: ABNORMAL K/UL (ref 0–0.2)
BASOPHILS NFR BLD: 0 % (ref 0–1.9)
BILIRUB SERPL-MCNC: 0.2 MG/DL (ref 0.1–1)
BUN SERPL-MCNC: 7 MG/DL (ref 6–20)
CALCIUM SERPL-MCNC: 7.9 MG/DL (ref 8.7–10.5)
CHLORIDE SERPL-SCNC: 106 MMOL/L (ref 95–110)
CO2 SERPL-SCNC: 25 MMOL/L (ref 23–29)
CREAT SERPL-MCNC: 0.6 MG/DL (ref 0.5–1.4)
DIFFERENTIAL METHOD: ABNORMAL
EOSINOPHIL # BLD AUTO: ABNORMAL K/UL (ref 0–0.5)
EOSINOPHIL NFR BLD: 2 % (ref 0–8)
ERYTHROCYTE [DISTWIDTH] IN BLOOD BY AUTOMATED COUNT: 16.9 % (ref 11.5–14.5)
EST. GFR  (AFRICAN AMERICAN): >60 ML/MIN/1.73 M^2
EST. GFR  (NON AFRICAN AMERICAN): >60 ML/MIN/1.73 M^2
GLUCOSE SERPL-MCNC: 140 MG/DL (ref 70–110)
HCT VFR BLD AUTO: 24 % (ref 37–48.5)
HGB BLD-MCNC: 7.2 G/DL (ref 12–16)
HYPOCHROMIA BLD QL SMEAR: ABNORMAL
LYMPHOCYTES # BLD AUTO: ABNORMAL K/UL (ref 1–4.8)
LYMPHOCYTES NFR BLD: 16 % (ref 18–48)
MAGNESIUM SERPL-MCNC: 1.8 MG/DL (ref 1.6–2.6)
MCH RBC QN AUTO: 23 PG (ref 27–31)
MCHC RBC AUTO-ENTMCNC: 30 G/DL (ref 32–36)
MCV RBC AUTO: 77 FL (ref 82–98)
MONOCYTES # BLD AUTO: ABNORMAL K/UL (ref 0.3–1)
MONOCYTES NFR BLD: 4 % (ref 4–15)
NEUTROPHILS NFR BLD: 76 % (ref 38–73)
NEUTS BAND NFR BLD MANUAL: 2 %
OVALOCYTES BLD QL SMEAR: ABNORMAL
PHOSPHATE SERPL-MCNC: 3.4 MG/DL (ref 2.7–4.5)
PLATELET # BLD AUTO: 541 K/UL (ref 150–350)
PLATELET BLD QL SMEAR: ABNORMAL
PMV BLD AUTO: 8.4 FL (ref 9.2–12.9)
POCT GLUCOSE: 151 MG/DL (ref 70–110)
POIKILOCYTOSIS BLD QL SMEAR: SLIGHT
POLYCHROMASIA BLD QL SMEAR: ABNORMAL
POTASSIUM SERPL-SCNC: 3.4 MMOL/L (ref 3.5–5.1)
PROCALCITONIN SERPL IA-MCNC: 0.25 NG/ML
PROT SERPL-MCNC: 5.7 G/DL (ref 6–8.4)
RBC # BLD AUTO: 3.13 M/UL (ref 4–5.4)
SODIUM SERPL-SCNC: 138 MMOL/L (ref 136–145)
WBC # BLD AUTO: 5.98 K/UL (ref 3.9–12.7)

## 2019-08-10 PROCEDURE — 25000003 PHARM REV CODE 250: Performed by: STUDENT IN AN ORGANIZED HEALTH CARE EDUCATION/TRAINING PROGRAM

## 2019-08-10 PROCEDURE — 80053 COMPREHEN METABOLIC PANEL: CPT

## 2019-08-10 PROCEDURE — 63600175 PHARM REV CODE 636 W HCPCS: Performed by: STUDENT IN AN ORGANIZED HEALTH CARE EDUCATION/TRAINING PROGRAM

## 2019-08-10 PROCEDURE — 85007 BL SMEAR W/DIFF WBC COUNT: CPT

## 2019-08-10 PROCEDURE — 83735 ASSAY OF MAGNESIUM: CPT

## 2019-08-10 PROCEDURE — 97802 MEDICAL NUTRITION INDIV IN: CPT

## 2019-08-10 PROCEDURE — 11000001 HC ACUTE MED/SURG PRIVATE ROOM

## 2019-08-10 PROCEDURE — 81025 URINE PREGNANCY TEST: CPT

## 2019-08-10 PROCEDURE — 25000003 PHARM REV CODE 250: Performed by: SURGERY

## 2019-08-10 PROCEDURE — 87040 BLOOD CULTURE FOR BACTERIA: CPT

## 2019-08-10 PROCEDURE — 84100 ASSAY OF PHOSPHORUS: CPT

## 2019-08-10 PROCEDURE — 84145 PROCALCITONIN (PCT): CPT

## 2019-08-10 PROCEDURE — 94761 N-INVAS EAR/PLS OXIMETRY MLT: CPT

## 2019-08-10 PROCEDURE — S0028 INJECTION, FAMOTIDINE, 20 MG: HCPCS | Performed by: STUDENT IN AN ORGANIZED HEALTH CARE EDUCATION/TRAINING PROGRAM

## 2019-08-10 PROCEDURE — 85027 COMPLETE CBC AUTOMATED: CPT

## 2019-08-10 RX ORDER — OXANDROLONE 2.5 MG/1
10 TABLET ORAL 2 TIMES DAILY
Status: DISCONTINUED | OUTPATIENT
Start: 2019-08-10 | End: 2019-08-21

## 2019-08-10 RX ORDER — LOPERAMIDE HYDROCHLORIDE 2 MG/1
2 CAPSULE ORAL 3 TIMES DAILY
Status: DISCONTINUED | OUTPATIENT
Start: 2019-08-10 | End: 2019-08-21

## 2019-08-10 RX ORDER — CEFEPIME HYDROCHLORIDE 2 G/50ML
2 INJECTION, SOLUTION INTRAVENOUS
Status: DISCONTINUED | OUTPATIENT
Start: 2019-08-10 | End: 2019-08-15 | Stop reason: SDUPTHER

## 2019-08-10 RX ORDER — MAGNESIUM SULFATE HEPTAHYDRATE 40 MG/ML
2 INJECTION, SOLUTION INTRAVENOUS ONCE
Status: COMPLETED | OUTPATIENT
Start: 2019-08-10 | End: 2019-08-10

## 2019-08-10 RX ORDER — MORPHINE 10 MG/ML
6 TINCTURE ORAL 3 TIMES DAILY
Status: DISCONTINUED | OUTPATIENT
Start: 2019-08-10 | End: 2019-08-13

## 2019-08-10 RX ORDER — POTASSIUM CHLORIDE 7.45 MG/ML
10 INJECTION INTRAVENOUS
Status: COMPLETED | OUTPATIENT
Start: 2019-08-10 | End: 2019-08-10

## 2019-08-10 RX ADMIN — MORPHINE 6 MG: 10 SOLUTION ORAL at 03:08

## 2019-08-10 RX ADMIN — OXYCODONE HYDROCHLORIDE 10 MG: 5 TABLET ORAL at 01:08

## 2019-08-10 RX ADMIN — CEFTRIAXONE SODIUM 2 G: 2 INJECTION, POWDER, FOR SOLUTION INTRAMUSCULAR; INTRAVENOUS at 11:08

## 2019-08-10 RX ADMIN — SODIUM CHLORIDE: 0.9 INJECTION, SOLUTION INTRAVENOUS at 10:08

## 2019-08-10 RX ADMIN — POTASSIUM CHLORIDE 10 MEQ: 7.46 INJECTION, SOLUTION INTRAVENOUS at 03:08

## 2019-08-10 RX ADMIN — OXANDROLONE 2.5 MG: 2.5 TABLET ORAL at 08:08

## 2019-08-10 RX ADMIN — OXANDROLONE 10 MG: 2.5 TABLET ORAL at 10:08

## 2019-08-10 RX ADMIN — MORPHINE 6 MG: 10 SOLUTION ORAL at 10:08

## 2019-08-10 RX ADMIN — FAMOTIDINE 20 MG: 10 INJECTION, SOLUTION INTRAVENOUS at 10:08

## 2019-08-10 RX ADMIN — PIPERACILLIN AND TAZOBACTAM 4.5 G: 4; .5 INJECTION, POWDER, LYOPHILIZED, FOR SOLUTION INTRAVENOUS; PARENTERAL at 08:08

## 2019-08-10 RX ADMIN — ASCORBIC ACID, VITAMIN A PALMITATE, CHOLECALCIFEROL, THIAMINE HYDROCHLORIDE, RIBOFLAVIN-5 PHOSPHATE SODIUM, PYRIDOXINE HYDROCHLORIDE, NIACINAMIDE, DEXPANTHENOL, ALPHA-TOCOPHEROL ACETATE, VITAMIN K1, FOLIC ACID, BIOTIN, CYANOCOBALAMIN: 200; 3300; 200; 6; 3.6; 6; 40; 15; 10; 150; 600; 60; 5 INJECTION, SOLUTION INTRAVENOUS at 11:08

## 2019-08-10 RX ADMIN — POTASSIUM CHLORIDE 10 MEQ: 7.46 INJECTION, SOLUTION INTRAVENOUS at 01:08

## 2019-08-10 RX ADMIN — FAMOTIDINE 20 MG: 10 INJECTION, SOLUTION INTRAVENOUS at 08:08

## 2019-08-10 RX ADMIN — PIPERACILLIN AND TAZOBACTAM 4.5 G: 4; .5 INJECTION, POWDER, LYOPHILIZED, FOR SOLUTION INTRAVENOUS; PARENTERAL at 12:08

## 2019-08-10 RX ADMIN — SODIUM CHLORIDE: 0.9 INJECTION, SOLUTION INTRAVENOUS at 02:08

## 2019-08-10 RX ADMIN — SUCRALFATE 1 G: 1 SUSPENSION ORAL at 04:08

## 2019-08-10 RX ADMIN — POTASSIUM CHLORIDE 10 MEQ: 7.46 INJECTION, SOLUTION INTRAVENOUS at 04:08

## 2019-08-10 RX ADMIN — ENOXAPARIN SODIUM 50 MG: 100 INJECTION SUBCUTANEOUS at 08:08

## 2019-08-10 RX ADMIN — ACYCLOVIR 400 MG: 200 CAPSULE ORAL at 08:08

## 2019-08-10 RX ADMIN — POTASSIUM CHLORIDE 10 MEQ: 7.46 INJECTION, SOLUTION INTRAVENOUS at 02:08

## 2019-08-10 RX ADMIN — OXYCODONE HYDROCHLORIDE 10 MG: 5 TABLET ORAL at 07:08

## 2019-08-10 RX ADMIN — SUCRALFATE 1 G: 1 SUSPENSION ORAL at 11:08

## 2019-08-10 RX ADMIN — MAGNESIUM SULFATE IN WATER 2 G: 40 INJECTION, SOLUTION INTRAVENOUS at 12:08

## 2019-08-10 RX ADMIN — OCTREOTIDE ACETATE 250 MCG/HR: 1000 INJECTION, SOLUTION INTRAVENOUS; SUBCUTANEOUS at 11:08

## 2019-08-10 RX ADMIN — ENOXAPARIN SODIUM 50 MG: 100 INJECTION SUBCUTANEOUS at 10:08

## 2019-08-10 RX ADMIN — LOPERAMIDE HYDROCHLORIDE 2 MG: 2 CAPSULE ORAL at 03:08

## 2019-08-10 RX ADMIN — ACYCLOVIR 400 MG: 200 CAPSULE ORAL at 10:08

## 2019-08-10 RX ADMIN — SUCRALFATE 1 G: 1 SUSPENSION ORAL at 08:08

## 2019-08-10 RX ADMIN — LOPERAMIDE HYDROCHLORIDE 2 MG: 2 CAPSULE ORAL at 10:08

## 2019-08-10 RX ADMIN — POTASSIUM CHLORIDE 10 MEQ: 7.46 INJECTION, SOLUTION INTRAVENOUS at 12:08

## 2019-08-10 RX ADMIN — HYDROMORPHONE HYDROCHLORIDE 1 MG: 2 INJECTION INTRAMUSCULAR; INTRAVENOUS; SUBCUTANEOUS at 10:08

## 2019-08-10 RX ADMIN — OXYCODONE HYDROCHLORIDE 10 MG: 5 TABLET ORAL at 09:08

## 2019-08-10 RX ADMIN — CEFEPIME HYDROCHLORIDE 2 G: 2 INJECTION, POWDER, FOR SOLUTION INTRAVENOUS at 10:08

## 2019-08-10 RX ADMIN — SUCRALFATE 1 G: 1 SUSPENSION ORAL at 10:08

## 2019-08-10 NOTE — PLAN OF CARE
Problem: Adult Inpatient Plan of Care  Goal: Plan of Care Review  Outcome: Ongoing (interventions implemented as appropriate)  Pt safely maintained. TPN maintaned @50ml/h. NS @125.  Meds give per MAR. Pain management with oxycodon as ordered.  Care plan reviwied with patient.

## 2019-08-10 NOTE — PROGRESS NOTES
"[facility]  Adult Nutrition  Progress Note    SUMMARY       Recommendations    Recommendation:   1. Encourage intake at meals as tolerated.   2. Continue TPN for now.     Goals:   1. Pt will tolerate TPN.   2. Pt will tolerate po diet with at least 50% intake at meals.  Nutrition Goal Status: new  Communication of RD Recs: reviewed with RN    Reason for Assessment  Reason For Assessment: new TPN  Diagnosis: (sepsis)  Relevant Medical History: DM, anxiety, ADD, gastric bypass, expl lap, cholecystectomy  General Information Comments: Pt on Regular diet with Boost Plus & Boost pudding. +colostomy. Receiving Clinimix 5/15 at 50ml/hr. PICC line. Noted pt has been on home TPN for ~ 1 month. Reports tolerating po diet at this time. UBW ~180#. NFPE completed 8/10-mild wating of temple & clavicle.  Nutrition Discharge Planning: pt to d/c on Regular diet & possible TPN    Nutrition Risk Screen  Nutrition Risk Screen: tube feeding or parenteral nutrition    Nutrition/Diet History  Food Preferences: no Confucianist or cultural food prefs identified  Spiritual, Cultural Beliefs, Mu-ism Practices, Values that Affect Care: no  Factors Affecting Nutritional Intake: altered gastrointestinal function    Anthropometrics  Temp: 98.2 °F (36.8 °C)  Height Method: Stated  Height: 5' 5" (165.1 cm)  Height (inches): 65 in  Weight Method: Bed Scale  Weight: 76.4 kg (168 lb 6.9 oz)  Weight (lb): 168.43 lb  Ideal Body Weight (IBW), Female: 125 lb  % Ideal Body Weight, Female (lb): 134.74 lb  BMI (Calculated): 28.1  BMI Grade: 25 - 29.9 - overweight  Usual Body Weight (UBW), k.8 kg  % Usual Body Weight: 93.59  % Weight Change From Usual Weight: -6.6 %     Lab/Procedures/Meds  Pertinent Labs Reviewed: reviewed  Pertinent Labs Comments: K 3.4L, Glu 140H, Ca 7.9L, Alb 2.0L, PAB 13L  Pertinent Medications Reviewed: reviewed  Pertinent Medications Comments: rocephin, oxandrin    Estimated/Assessed Needs  Weight Used For " Calorie Calculations: 76.4 kg (168 lb 6.9 oz)  Energy Calorie Requirements (kcal): 1838  Energy Need Method: [place](x1.3)  Protein Requirements: 76g (1.0g/kg)  Weight Used For Protein Calculations: 76.4 kg (168 lb 6.9 oz)  Estimated Fluid Requirement Method: RDA Method  RDA Method (mL): 1838     Nutrition Prescription Ordered  Current Diet Order: Regular  Current Nutrition Support Formula Ordered: Clinimix E 5/15  Current Nutrition Support Rate Ordered: 50 (ml)  Current Nutrition Support Frequency Ordered: ml/hr  Oral Nutrition Supplement: Boost Plus, Boost pudding    Evaluation of Received Nutrient/Fluid Intake  Parenteral Calories (kcal): 852  Parenteral Protein (gm): 60  Parenteral Fluid (mL): 1200  GIR (Glucose Infusion Rate) (mg/kg/min): 1.6 mg/kg/min  % Kcal Needs: 46  % Protein Needs: 79  I/O: 4125/1435  Comments: LBM 8/9  % Intake of Estimated Energy Needs: 50 - 75 %  % Meal Intake: 0 - 25 %    Nutrition Risk  Level of Risk/Frequency of Follow-up: (2xweekly)     Assessment and Plan    Protein calorie malnutrition  Malnutrition in the context of Acute Illness/Injury    Related to (etiology):  S/p surgery/sepsis    Signs and Symptoms (as evidenced by):  Energy Intake: <50% of estimated energy requirement for 1 month  Muscle Mass Depletion: mild depletion of temples, clavicle region and scapular region   Weight Loss: 6% x 1 month     Interventions:  Parenteral nutrition  Commercial beverage    Nutrition Diagnosis Status:  New       Monitor and Evaluation  Food and Nutrient Intake: food and beverage intake, parenteral nutrition intake  Food and Nutrient Adminstration: diet order, enteral and parenteral nutrition administration  Physical Activity and Function: nutrition-related ADLs and IADLs  Anthropometric Measurements: weight  Biochemical Data, Medical Tests and Procedures: electrolyte and renal panel  Nutrition-Focused Physical Findings: overall appearance     Malnutrition Assessment  Malnutrition  Type: acute illness or injury  Weight Loss (Malnutrition): greater than 5% in 1 month   [place] Region (Muscle Loss): mild depletion  Clavicle Bone Region (Muscle Loss): mild depletion   Subcutaneous Fat Loss (Final Summary): well nourished  Muscle Loss Evaluation (Final Summary): mild protein-calorie malnutrition       Nutrition Follow-Up  RD Follow-up?: Yes

## 2019-08-10 NOTE — PROGRESS NOTES
GENERAL SURGERY  PROGRESS NOTE    Admit Date: 8/8/2019  Hospital Day: 2  Procedure:         SUBJECTIVE:      Patient is tolerating diet with no nausea or vomiting. Patient is doing well. Abdominal pain improving. Patient is ambulating with no issues. Denies fever, chills, chest pain or SOB.     Review of Systems   Cardiovascular: Negative for chest pain.   Respiratory: Negative for shortness of breath. Negative for cough .    Gastrointestinal: Negative for nausea and vomiting.      Continuous Infusions:   sodium chloride 0.9% 125 mL/hr at 08/10/19 0255    Amino acid 5% - dextrose 15% (CLINIMIX-E) solution with additives (1L  provides 510 kcal/L dextrose, with 50 gm AA, 150 gm CHO, Na 35, K 30, Mg 5, Ca 4.5, Acetate 80, Cl 39, Phos 15) 50 mL/hr at 08/09/19 1943     Scheduled Meds:   acyclovir  400 mg Oral BID    enoxaparin  50 mg Subcutaneous Q12H    famotidine (PF)  20 mg Intravenous BID    lidocaine (PF) 10 mg/ml (1%)  1 mL Other Once    oxandrolone  2.5 mg Oral BID    piperacillin-tazobactam (ZOSYN) IVPB  4.5 g Intravenous Q8H    sucralfate  1 g Oral QID (WM & HS)         OBJECTIVE:     Vital Signs (Most Recent)  Temp: 98 °F (36.7 °C) (08/10/19 0827)  Pulse: 86 (08/10/19 0827)  Resp: 20 (08/10/19 0827)  BP: 106/68 (08/10/19 0827)  SpO2: 99 % (08/10/19 0819)    Vital Signs Range (Last 24H):  Temp:  [97.7 °F (36.5 °C)-98.3 °F (36.8 °C)]   Pulse:  [78-99]   Resp:  [18-20]   BP: ()/(54-70)   SpO2:  [98 %-99 %]     I & O (Last 24H):    Intake/Output Summary (Last 24 hours) at 8/10/2019 0854  Last data filed at 8/10/2019 0443  Gross per 24 hour   Intake 4124.58 ml   Output 1435 ml   Net 2689.58 ml       Physical Exam:    Constitutional: She appears well-developed and well-nourished. No distress.   HENT:   Head: Normocephalic and atraumatic.   Eyes: Conjunctivae are normal.   Neck: Neck supple.   Cardiovascular: Normal rate, regular rhythm and intact distal pulses.   Pulmonary/Chest: Effort normal. No  respiratory distress.   Abdominal: Soft. She exhibits no distension. There is no rebound and no guarding.   Jejunostomy to LUQ with gas/liquid stool to bag   Skin: She is not diaphoretic.   Nursing note and vitals reviewed.      Inpatient Data      Vitals:   Temp:  [97.7 °F (36.5 °C)-98.3 °F (36.8 °C)]   Pulse:  [78-99]   Resp:  [18-20]   BP: ()/(54-70)   SpO2:  [98 %-99 %]     Diet Dysphagia Soft (IDDSI Level 6)  Amino acid 5% - dextrose 15% (CLINIMIX-E) solution with additives.  CENTRAL LINE ONLY (1395 mOsm/L). 1L provides 50 gm AA, 150 gm CHO (510 kcal/L dextrose), Na 35, K 30, Mg 5, Ca 4.5, Acetate 80, Cl 39, Phos 15.     Intake/Output Summary (Last 24 hours) at 8/10/2019 0854  Last data filed at 8/10/2019 0443  Gross per 24 hour   Intake 4124.58 ml   Output 1435 ml   Net 2689.58 ml          Recent Labs     08/08/19  2307 08/09/19  0634   WBC 7.47 6.18   HGB 7.7* 7.2*   HCT 25.5* 24.0*   * 456*   * 135*   K 3.4* 3.8   CL 97 100   CO2 29 28   BUN 10 10   CREATININE 0.6 0.6   BILITOT 0.2 0.2   AST 23 23   ALT 38 34   ALKPHOS 207* 183*   CALCIUM 8.1* 7.7*   ALBUMIN 1.9* 1.8*   PROT 6.0 5.6*   MG 2.0 1.8   PHOS 2.8 3.5     Scheduled Meds:   acyclovir 400 mg BID   enoxaparin 50 mg Q12H   famotidine (PF) 20 mg BID   lidocaine (PF) 10 mg/ml (1%) 1 mL Once   oxandrolone 2.5 mg BID   piperacillin-tazobactam (ZOSYN) IVPB 4.5 g Q8H   sucralfate 1 g QID (WM & HS)      sodium chloride 0.9% 125 mL/hr at 08/10/19 0255    Amino acid 5% - dextrose 15% (CLINIMIX-E) solution with additives (1L  provides 510 kcal/L dextrose, with 50 gm AA, 150 gm CHO, Na 35, K 30, Mg 5, Ca 4.5, Acetate 80, Cl 39, Phos 15) 50 mL/hr at 08/09/19 1943        Lines/Drains:       PICC Double Lumen right brachial (Active)   Site Assessment Clean;Dry;Intact 8/9/2019  8:11 PM   Lumen 1 Status Flushed;Infusing 8/9/2019  8:11 PM   Lumen 2 Status Flushed;Infusing 8/9/2019  8:11 PM   Dressing Type Transparent 8/9/2019  8:11 PM   Dressing  Status Biopatch in place;Clean;Dry;Intact 8/9/2019  8:11 PM   Dressing Change Due 08/15/19 8/9/2019  8:11 PM   Daily Line Review Performed 8/9/2019  8:11 PM   Number of days:             Colostomy 08/08/19 1900 LUQ (Active)   Stomal Appliance 1 piece 8/9/2019  8:11 PM   Stoma Appearance round 8/9/2019  8:11 PM   Site Assessment Clean;Intact 8/9/2019  8:11 PM   Peristomal Assessment Clean;Intact 8/9/2019  8:11 PM   Stoma Function flatus;stool 8/9/2019  8:11 PM   Tolerance no signs/symptoms of discomfort 8/9/2019  8:11 PM   Output (mL) 225 mL 8/9/2019 11:17 PM   Number of days: 1       ASSESSMENT/PLAN:       43 y.o. woman transferred from OSH with abdominal pain and sepsis (GNR). She has a history of eddie-en-y gastric bypass 13 years ago, DM and small bowel volvulus with ischemia s/p ex lap with ischemia to significant portion of small bowel and subsequent resection with jejunostomy ~1 month ago presenting for GNR bacteremia. Doing well today    Advance diet to Regular  Continue TPN  Pain/nausea control PRN  Continue Zosyn for GNR blood cultures, ID consulted  Surgical planning once bacteremia resolves      Halina Spencer, PGY-2  [facility]  08/10/2019

## 2019-08-10 NOTE — PLAN OF CARE
Problem: Adult Inpatient Plan of Care  Goal: Plan of Care Review  Outcome: Ongoing (interventions implemented as appropriate)  POC discussed with patient. Initiated TPN infusion. IV abx continued. Self-care of colostomy continued. Patient reporting output to staff. Pain well controlled with PRN medication. Patient encouraged to use call light for any needs that may arise. Will continue to monitor for any changes.

## 2019-08-10 NOTE — ASSESSMENT & PLAN NOTE
Malnutrition in the context of Acute Illness/Injury    Related to (etiology):  S/p surgery/sepsis    Signs and Symptoms (as evidenced by):  Energy Intake: <50% of estimated energy requirement for 1 month  Muscle Mass Depletion: mild depletion of temples, clavicle region and scapular region   Weight Loss: 6% x 1 month     Interventions:  Parenteral nutrition  Commercial beverage    Nutrition Diagnosis Status:  Continues

## 2019-08-10 NOTE — CONSULTS
[facility] Consult     Primary Team: Dr. Fuchs   Consultant Attending: Dr. Fraser  Date of Admit: 8/8/2019    Reason for Consult     Bacteremia, GNR    History of Present Illness     43 y.o. woman transferred from OSH with abdominal pain and sepsis (GNR). 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 Patient states that this was performed at [facility] in [place], [place]. Her stay was complicated by bilateral pulmonary embolisms. Patient reports having no issues with gastric bypass until episode of abdominal pain and resulting surgery last month. She now reports having a few days of abdominal pain with associated nausea/vomiting, fever/chills. Patient has been on TPN at home since discharge from hospital last month via PICC line. E PICC line removed yesterday due to bacterimia and new PICC placed to [facility]. Patient states that she had blood cultures drawn on 8/7 which showed GNR on 8/8 and she was started on Zosyn prior to transfer to [place]. Patient has had several readmission to [facility] since her surgery and states that she has been treated with courses of Zosyn and Zyvox repeatedly during this time. She states that she has been taking her axillary temperature at home since June and that she has been intermittently febrile over the past two months with temperatures reaching 103. Neuroendocrine service is considering reconnection of current alimentary tract vs small intestine transplant.    Review of Systems (positives in bold):  Constitutional: wt loss, fever, chills, night sweats, fatigue, malaise  HEENT: dysphonia, epistaxis, tinnitus, vertigo, otalgia, otorrhea, visual changes, lacrimation, changes in hearing, rhinorrhea, sore throat  Urogenital: frequency, nocturia, dysuria, hematuria, retention,  incontinence, discharge  Neurological: headaches, syncope, weakness, numbness, paresthesia, dysequilibrium, falls, amnesia, dysarthria, facial assymetry  Gastrointestinal: anorexia, nausea, vomiting, melena, hematochezia, abdominal pain, hematemesis, diarrhea, constipation, dyspepsia, dysphagia, odynophagia, dysgeusia  Respiratory: dyspnea, cough, sputum production, wheeze, hemoptysis, cyanosis, apnea  Integumentary: hypo/hyperpigmentation, rash, lesions, pruritus, alopecia, nail changes  Cardiovascular: chest pain, orthopnea, cyanosis, edema, claudication, syncope, palpitations  Hematological: bleeding, bruising, lymphadenopathy  Psych: insomnia, mood changes, hallucinations, anxiety  Reproductive: erectile dysfunction in men, abnormal uterine bleeding in women, changes in libido  Immune/Allergy: urticaria, localized pain/erythema/warmth/swelling  Musculoskeletal: arthralgia, myalgia, joint swelling, stiffness, wasting, deformity, weakness, back pain  Endocrine: gynecomastia, galactorrhea, weight gain, heat/cold intolerance, polyphagia, polydipsia, polyuria    Allergies:  Review of patient's allergies indicates:  No Known Allergies    Medications:   In-Hospital Scheduled Medications:   acyclovir  400 mg Oral BID    enoxaparin  50 mg Subcutaneous Q12H    famotidine (PF)  20 mg Intravenous BID    lidocaine (PF) 10 mg/ml (1%)  1 mL Other Once    oxandrolone  2.5 mg Oral BID    piperacillin-tazobactam (ZOSYN) IVPB  4.5 g Intravenous Q8H    sucralfate  1 g Oral QID (WM & HS)      In-Hospital PRN Medications:  acetaminophen, acetaminophen, HYDROmorphone, ondansetron, oxyCODONE, sodium chloride 0.9%, traMADol, zolpidem   In-Hospital IV Infusion Medications:   sodium chloride 0.9% 125 mL/hr at 08/09/19 1608    Amino acid 5% - dextrose 15% (CLINIMIX-E) solution with additives (1L  provides 510 kcal/L dextrose, with 50 gm AA, 150 gm CHO, Na 35, K 30, Mg 5, Ca 4.5, Acetate 80, Cl 39, Phos 15) 50 mL/hr at 08/09/19       Relevant Home Medications:    Antibiotics and Day Number of Therapy:  Acyclovir and Zosyn  Past Medical History:  Past Medical History:   Diagnosis Date    ADD (attention deficit disorder)     Anxiety     Diabetes     Insomnia      Past Surgical History/ObGyn Hx if gender appropriate:  Past Surgical History:   Procedure Laterality Date    CHOLECYSTECTOMY      ENDOMETRIAL ABLATION      EXPLORATORY LAPAROTOMY  2019    SBR -ischemic injury    GASTRIC BYPASS  2006    [place]  DELIVERY ONLY  2003    , Low Cervical    TUBAL LIGATION       Family History:  History reviewed. No pertinent family history.  Social History:  Social History     Tobacco Use    Smoking status: Never Smoker   Substance Use Topics    Alcohol use: Not Currently    Drug use: Never       Objective   Last 24 Hour Vital Signs:  BP  Min: 89/56  Max: 108/70  Temp  Av.1 °F (36.7 °C)  Min: 97.7 °F (36.5 °C)  Max: 98.5 °F (36.9 °C)  Pulse  Av.5  Min: 79  Max: 99  Resp  Av  Min: 18  Max: 18  SpO2  Av.2 %  Min: 96 %  Max: 99 %  Weight  Av kg (167 lb 8.8 oz)  Min: 76 kg (167 lb 8.8 oz)  Max: 76 kg (167 lb 8.8 oz)    Lines, Drains, Airway:  Colostomy LUQ  Right brachial PICC line    Physical Examination:  Examination  General: well appearing, comfortable, LUQ colostomy noted, right brachial PICC noted  HEENT: normal oral mucosa, normal dentition, conjunctiva normal, pupils normal, extraocular motion normal  Neck: no thyromegaly, no JVD   Cardiac: no murmurs, pulse regular    Pulmonary/Chest: chest clear, no respiratory distress   GI/Rectal: no organomegaly, no masses, non tender, normal bowel sounds, rectal exam deferred   : deferred   Msk: normal motor screening exam  Vascular: normal peripheral perfusion   Lymph nodes: none palpated  Skin/ Extremities: no rash, no pedal edema, no ulceration    Neurology/ Mental status: alert oriented     Laboratory:  CBC:   Lab Results   Component Value Date     WBC 6.18 08/09/2019    HGB 7.2 (L) 08/09/2019     (H) 08/09/2019    MCV 76 (L) 08/09/2019    RDW 16.4 (H) 08/09/2019       Estimated Creatinine Clearance: 122 mL/min (based on SCr of 0.6 mg/dL).    Microbiology Data:  Need to obtain from previous facility    Assessment     Parvin Corbin is a 43 y.o. female who is reported to have GNR growth in blood culture from previous admitting facility. She is on Zosyn and this appears to be day 2 of the medication. We'll need to obtain her culture results from [facility] tomorrow. She is currently afebrile with no leukocytosis.      Recommendations   Bacteremia  - Gram negative rods noted at [facility], we'll need to obtain records  - Recommend repeating blood cultures  - Continue empiric coverage with Zosyn  - Procalcitonin and CBC in AM    Thank you for this consult. We will follow.      Jim Ayala  Fellow, [facility]

## 2019-08-10 NOTE — PLAN OF CARE
VN rounds:  VN cued into pt's room with pt's permission.  Pt resting in bed in low position with call bell near. Fall risk protocol discussed with pt.  VN instructed to call for assistance.  Pt aware and agreeable.   No acute distress noted.  Pt denies pain, anxiety and nausea.  Allowed time for questions.  Will continue to be available and intervene as needed.

## 2019-08-10 NOTE — PLAN OF CARE
Problem: Parenteral Nutrition  Goal: Effective Intravenous Nutrition Therapy Delivery  Outcome: Ongoing (interventions implemented as appropriate)  Recommendation:   1. Encourage intake at meals as tolerated.   2. Continue TPN for now.     Goals:   1. Pt will tolerate TPN.   2. Pt will tolerate po diet with at least 50% intake at meals.  Nutrition Goal Status: new  Communication of RD Recs: reviewed with RN

## 2019-08-11 LAB
ABO + RH BLD: NORMAL
ALBUMIN SERPL BCP-MCNC: 2 G/DL (ref 3.5–5.2)
ALP SERPL-CCNC: 161 U/L (ref 55–135)
ALT SERPL W/O P-5'-P-CCNC: 19 U/L (ref 10–44)
ANION GAP SERPL CALC-SCNC: 4 MMOL/L (ref 8–16)
AST SERPL-CCNC: 15 U/L (ref 10–40)
BASOPHILS # BLD AUTO: 0.02 K/UL (ref 0–0.2)
BASOPHILS NFR BLD: 0.3 % (ref 0–1.9)
BILIRUB SERPL-MCNC: 0.1 MG/DL (ref 0.1–1)
BLD GP AB SCN CELLS X3 SERPL QL: NORMAL
BLD PROD TYP BPU: NORMAL
BLOOD UNIT EXPIRATION DATE: NORMAL
BLOOD UNIT TYPE CODE: 9500
BLOOD UNIT TYPE: NORMAL
BUN SERPL-MCNC: 6 MG/DL (ref 6–20)
CALCIUM SERPL-MCNC: 7.9 MG/DL (ref 8.7–10.5)
CHLORIDE SERPL-SCNC: 108 MMOL/L (ref 95–110)
CO2 SERPL-SCNC: 26 MMOL/L (ref 23–29)
CODING SYSTEM: NORMAL
CREAT SERPL-MCNC: 0.6 MG/DL (ref 0.5–1.4)
DIFFERENTIAL METHOD: ABNORMAL
DISPENSE STATUS: NORMAL
EOSINOPHIL # BLD AUTO: 0.2 K/UL (ref 0–0.5)
EOSINOPHIL NFR BLD: 3.3 % (ref 0–8)
ERYTHROCYTE [DISTWIDTH] IN BLOOD BY AUTOMATED COUNT: 16.7 % (ref 11.5–14.5)
EST. GFR  (AFRICAN AMERICAN): >60 ML/MIN/1.73 M^2
EST. GFR  (NON AFRICAN AMERICAN): >60 ML/MIN/1.73 M^2
GLUCOSE SERPL-MCNC: 158 MG/DL (ref 70–110)
HCT VFR BLD AUTO: 23.6 % (ref 37–48.5)
HGB BLD-MCNC: 6.8 G/DL (ref 12–16)
LYMPHOCYTES # BLD AUTO: 2 K/UL (ref 1–4.8)
LYMPHOCYTES NFR BLD: 27.6 % (ref 18–48)
MAGNESIUM SERPL-MCNC: 1.9 MG/DL (ref 1.6–2.6)
MCH RBC QN AUTO: 22.1 PG (ref 27–31)
MCHC RBC AUTO-ENTMCNC: 28.8 G/DL (ref 32–36)
MCV RBC AUTO: 77 FL (ref 82–98)
MONOCYTES # BLD AUTO: 0.6 K/UL (ref 0.3–1)
MONOCYTES NFR BLD: 8.6 % (ref 4–15)
NEUTROPHILS # BLD AUTO: 4.3 K/UL (ref 1.8–7.7)
NEUTROPHILS NFR BLD: 60.2 % (ref 38–73)
PHOSPHATE SERPL-MCNC: 2.7 MG/DL (ref 2.7–4.5)
PLATELET # BLD AUTO: 542 K/UL (ref 150–350)
PMV BLD AUTO: 8.5 FL (ref 9.2–12.9)
POTASSIUM SERPL-SCNC: 4.1 MMOL/L (ref 3.5–5.1)
PROT SERPL-MCNC: 5.6 G/DL (ref 6–8.4)
RBC # BLD AUTO: 3.07 M/UL (ref 4–5.4)
SODIUM SERPL-SCNC: 138 MMOL/L (ref 136–145)
TRANS ERYTHROCYTES VOL PATIENT: NORMAL ML
WBC # BLD AUTO: 7.2 K/UL (ref 3.9–12.7)

## 2019-08-11 PROCEDURE — 94761 N-INVAS EAR/PLS OXIMETRY MLT: CPT

## 2019-08-11 PROCEDURE — 63600175 PHARM REV CODE 636 W HCPCS: Performed by: STUDENT IN AN ORGANIZED HEALTH CARE EDUCATION/TRAINING PROGRAM

## 2019-08-11 PROCEDURE — 25000003 PHARM REV CODE 250: Performed by: SURGERY

## 2019-08-11 PROCEDURE — 11000001 HC ACUTE MED/SURG PRIVATE ROOM

## 2019-08-11 PROCEDURE — S0028 INJECTION, FAMOTIDINE, 20 MG: HCPCS | Performed by: STUDENT IN AN ORGANIZED HEALTH CARE EDUCATION/TRAINING PROGRAM

## 2019-08-11 PROCEDURE — 25000003 PHARM REV CODE 250: Performed by: STUDENT IN AN ORGANIZED HEALTH CARE EDUCATION/TRAINING PROGRAM

## 2019-08-11 PROCEDURE — 86901 BLOOD TYPING SEROLOGIC RH(D): CPT

## 2019-08-11 PROCEDURE — 80053 COMPREHEN METABOLIC PANEL: CPT

## 2019-08-11 PROCEDURE — 86920 COMPATIBILITY TEST SPIN: CPT

## 2019-08-11 PROCEDURE — 36430 TRANSFUSION BLD/BLD COMPNT: CPT

## 2019-08-11 PROCEDURE — 85025 COMPLETE CBC W/AUTO DIFF WBC: CPT

## 2019-08-11 PROCEDURE — 84100 ASSAY OF PHOSPHORUS: CPT

## 2019-08-11 PROCEDURE — 83735 ASSAY OF MAGNESIUM: CPT

## 2019-08-11 PROCEDURE — P9021 RED BLOOD CELLS UNIT: HCPCS

## 2019-08-11 RX ORDER — HYDROCODONE BITARTRATE AND ACETAMINOPHEN 500; 5 MG/1; MG/1
TABLET ORAL
Status: DISCONTINUED | OUTPATIENT
Start: 2019-08-11 | End: 2019-08-25

## 2019-08-11 RX ORDER — CYANOCOBALAMIN 1000 UG/ML
1000 INJECTION, SOLUTION INTRAMUSCULAR; SUBCUTANEOUS DAILY
Status: COMPLETED | OUTPATIENT
Start: 2019-08-11 | End: 2019-08-15

## 2019-08-11 RX ORDER — MAGNESIUM SULFATE HEPTAHYDRATE 40 MG/ML
2 INJECTION, SOLUTION INTRAVENOUS ONCE
Status: COMPLETED | OUTPATIENT
Start: 2019-08-11 | End: 2019-08-11

## 2019-08-11 RX ADMIN — CEFEPIME HYDROCHLORIDE 2 G: 2 INJECTION, POWDER, FOR SOLUTION INTRAVENOUS at 01:08

## 2019-08-11 RX ADMIN — MAGNESIUM SULFATE IN WATER 2 G: 40 INJECTION, SOLUTION INTRAVENOUS at 07:08

## 2019-08-11 RX ADMIN — CYANOCOBALAMIN 1000 MCG: 1000 INJECTION, SOLUTION INTRAMUSCULAR at 11:08

## 2019-08-11 RX ADMIN — HYDROMORPHONE HYDROCHLORIDE 1 MG: 2 INJECTION INTRAMUSCULAR; INTRAVENOUS; SUBCUTANEOUS at 05:08

## 2019-08-11 RX ADMIN — IRON SUCROSE 200 MG: 20 INJECTION, SOLUTION INTRAVENOUS at 11:08

## 2019-08-11 RX ADMIN — ENOXAPARIN SODIUM 50 MG: 100 INJECTION SUBCUTANEOUS at 08:08

## 2019-08-11 RX ADMIN — MORPHINE 6 MG: 10 SOLUTION ORAL at 08:08

## 2019-08-11 RX ADMIN — SUCRALFATE 1 G: 1 SUSPENSION ORAL at 07:08

## 2019-08-11 RX ADMIN — MORPHINE 6 MG: 10 SOLUTION ORAL at 02:08

## 2019-08-11 RX ADMIN — ONDANSETRON 8 MG: 8 TABLET, ORALLY DISINTEGRATING ORAL at 05:08

## 2019-08-11 RX ADMIN — OXYCODONE HYDROCHLORIDE 10 MG: 5 TABLET ORAL at 09:08

## 2019-08-11 RX ADMIN — OXYCODONE HYDROCHLORIDE 10 MG: 5 TABLET ORAL at 11:08

## 2019-08-11 RX ADMIN — SUCRALFATE 1 G: 1 SUSPENSION ORAL at 05:08

## 2019-08-11 RX ADMIN — CEFEPIME HYDROCHLORIDE 2 G: 2 INJECTION, POWDER, FOR SOLUTION INTRAVENOUS at 09:08

## 2019-08-11 RX ADMIN — FAMOTIDINE 20 MG: 10 INJECTION, SOLUTION INTRAVENOUS at 09:08

## 2019-08-11 RX ADMIN — ACYCLOVIR 400 MG: 200 CAPSULE ORAL at 09:08

## 2019-08-11 RX ADMIN — SODIUM PHOSPHATE, MONOBASIC, MONOHYDRATE 30 MMOL: 276; 142 INJECTION, SOLUTION INTRAVENOUS at 09:08

## 2019-08-11 RX ADMIN — OCTREOTIDE ACETATE 250 MCG/HR: 1000 INJECTION, SOLUTION INTRAVENOUS; SUBCUTANEOUS at 04:08

## 2019-08-11 RX ADMIN — MORPHINE 6 MG: 10 SOLUTION ORAL at 09:08

## 2019-08-11 RX ADMIN — OXANDROLONE 10 MG: 2.5 TABLET ORAL at 09:08

## 2019-08-11 RX ADMIN — ENOXAPARIN SODIUM 50 MG: 100 INJECTION SUBCUTANEOUS at 09:08

## 2019-08-11 RX ADMIN — LOPERAMIDE HYDROCHLORIDE 2 MG: 2 CAPSULE ORAL at 08:08

## 2019-08-11 RX ADMIN — OXYCODONE HYDROCHLORIDE 10 MG: 5 TABLET ORAL at 03:08

## 2019-08-11 RX ADMIN — LOPERAMIDE HYDROCHLORIDE 2 MG: 2 CAPSULE ORAL at 09:08

## 2019-08-11 RX ADMIN — OXANDROLONE 10 MG: 2.5 TABLET ORAL at 08:08

## 2019-08-11 RX ADMIN — ACYCLOVIR 400 MG: 200 CAPSULE ORAL at 08:08

## 2019-08-11 RX ADMIN — CEFEPIME HYDROCHLORIDE 2 G: 2 INJECTION, POWDER, FOR SOLUTION INTRAVENOUS at 04:08

## 2019-08-11 RX ADMIN — SUCRALFATE 1 G: 1 SUSPENSION ORAL at 09:08

## 2019-08-11 RX ADMIN — LOPERAMIDE HYDROCHLORIDE 2 MG: 2 CAPSULE ORAL at 02:08

## 2019-08-11 RX ADMIN — SUCRALFATE 1 G: 1 SUSPENSION ORAL at 11:08

## 2019-08-11 RX ADMIN — FAMOTIDINE 20 MG: 10 INJECTION, SOLUTION INTRAVENOUS at 08:08

## 2019-08-11 NOTE — PLAN OF CARE
Problem: Adult Inpatient Plan of Care  Goal: Plan of Care Review  Outcome: Ongoing (interventions implemented as appropriate)  Patient is resting and medications given per orders in MAR. PRN medication given for pain management. Patient vomited once during shift, PRN medication was given. Colostomy bag in place, with output in Flowsheets.  at bedside. TPN,IVs, and Kerry infusing. VSS. Safety maintained, remained free from falls. Instucted to call about concerns, needs, or assistance. Will continue to monitor.

## 2019-08-11 NOTE — PLAN OF CARE
Brief ID Plan of Care Note:  MDR E.coli bacteremia  -Treating with Cefepime as per sensitivities from blood culture from 8/7, continue  - Blood cultures obtained on 8/10 at our facility, no growth at 1 day  - Pt remains afebrile, WBC wnl  - Patient will require at least a 14 day course of treatment  - Line infection more likely than intra-abdominal at this point  - Will follow up on cultures and adjust antibiotics accordingly.    Thank you for your consult. We will follow.    Dequan Mascorro Jr., MD  Intern, PGY1

## 2019-08-11 NOTE — PROGRESS NOTES
[facility] Progress Note     Primary Team: Dr. Fuchs  Consultant Attending: Dr. Fraser  Date of Admit: 8/8/2019    Summary of history      43 y.o. woman transferred from OSH with abdominal pain and sepsis (GNR). 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 Patient states that this was performed at [facility] in [place], [place]. Her stay was complicated by bilateral pulmonary embolisms. Patient reports having no issues with gastric bypass until episode of abdominal pain and resulting surgery last month. She now reports having a few days of abdominal pain with associated nausea/vomiting, fever/chills. Patient has been on TPN at home since discharge from hospital last month via PICC line. E PICC line removed yesterday due to bacterimia and new PICC placed to [facility]. Patient states that she had blood cultures drawn on 8/7 which showed GNR on 8/8 and she was started on Zosyn prior to transfer to [place]. Patient has had several readmission to [facility] since her surgery and states that she has been treated with courses of Zosyn and Zyvox repeatedly during this time. She states that she has been taking her axillary temperature at home since June and that she has been intermittently febrile over the past two months with temperatures reaching 103. Neuroendocrine service is considering reconnection of current alimentary tract vs small intestine transplant.     Interval events     In the interim patient appears to have E.coli bacteremia that shows resistance to Levofloxacin, Bactrim, Zosyn, Cipro, Cefazolin, Amp-Sulbactam and Ampicillin as per the verbal report from [facility]. All four vials from 8/7 are growing this organism.     Patient was initially on Zosyn on admission and we switched her to  "Cefepime  Subjective     Patient overall is resting comfortably and has no complaints.     Current Medications:     Infusions:   sodium chloride 0.9% 125 mL/hr at 08/10/19 0255    octreotide infusion (50 mcg/mL) 250 mcg/hr (08/10/19 1120)        Scheduled:   acyclovir  400 mg Oral BID    ceFEPime (MAXIPIME) IVPB  2 g Intravenous Q8H    enoxaparin  50 mg Subcutaneous Q12H    famotidine (PF)  20 mg Intravenous BID    lidocaine (PF) 10 mg/ml (1%)  1 mL Other Once    loperamide  2 mg Oral TID    opium tincture  6 mg Oral TID    oxandrolone  10 mg Oral BID    sucralfate  1 g Oral QID (WM & HS)        PRN:  acetaminophen, acetaminophen, HYDROmorphone, ondansetron, oxyCODONE, sodium chloride 0.9%, traMADol, zolpidem    Antibiotics and Day Number of Therapy:  Cefepime and Acyclovir    Objective   Last 24 Hour Vital Signs:  BP  Min: 98/54  Max: 113/68  Temp  Av.1 °F (36.7 °C)  Min: 97.9 °F (36.6 °C)  Max: 98.3 °F (36.8 °C)  Pulse  Av.3  Min: 78  Max: 92  Resp  Av.3  Min: 18  Max: 21  SpO2  Av.8 %  Min: 98 %  Max: 99 %  Height  Av' 5" (165.1 cm)  Min: 5' 5" (165.1 cm)  Max: 5' 5" (165.1 cm)  Weight  Av.4 kg (168 lb 6.9 oz)  Min: 76.4 kg (168 lb 6.9 oz)  Max: 76.4 kg (168 lb 6.9 oz)    Lines, drains, airway:  Colostomy LUQ  Right brachial PICC line    Physical Examination:    General: well appearing, comfortable, LUQ colostomy noted, right brachial PICC noted  HEENT: normal oral mucosa, normal dentition, conjunctiva normal, pupils normal, extraocular motion normal  Neck: no thyromegaly, no JVD   Cardiac: no murmurs, pulse regular    Pulmonary/Chest: chest clear, no respiratory distress   GI/Rectal: no organomegaly, no masses, non tender, normal bowel sounds, rectal exam deferred   : deferred   Msk: normal motor screening exam  Vascular: normal peripheral perfusion   Lymph nodes: none palpated  Skin/ Extremities: no rash, no pedal edema, no ulceration    Neurology/ Mental status: alert " oriented     Lab data:  CBC:   Lab Results   Component Value Date    WBC 5.98 08/10/2019    HGB 7.2 (L) 08/10/2019     (H) 08/10/2019    MCV 77 (L) 08/10/2019    RDW 16.9 (H) 08/10/2019       Estimated Creatinine Clearance: 122.4 mL/min (based on SCr of 0.6 mg/dL).    Microbiology Data  Microbiology Results (last 7 days)     Procedure Component Value Units Date/Time    Blood culture [[id number]] Collected:  08/10/19 0936    Order Status:  Sent Specimen:  Blood from Line, PICC Left Brachial Updated:  08/10/19 1726    Blood culture [[id number]] Collected:  08/10/19 0936    Order Status:  Sent Specimen:  Blood from Line, PICC Left Brachial Updated:  08/10/19 1726          Assessment     Parvin Corbin is a 43 y.o.female with past medical history as above. We are currently treating her E. Coli bacteremia. We'll switch to Cefepime as per the reported susceptibilities. Patient today is afebrile with no leukocytosis noted.       Recommendations     MDR E.coli bacteremia  - Blood cultures obtained on 8/10 at our facility  -Treating with Cefepime as per sensitivities from blood culture from 8/7  - Patient will require at least a 14 day course of treatment    Thank you for this consult. We will follow.    Jim Ayala  [facility] ID Fellow

## 2019-08-11 NOTE — NURSING
SBAR report received from JOSE MARTIN Schmidt RN and care initiated. Pt is in bed , AAOX4 on room air, NAD noted. Family at bedside. TPN, NS and Sandostatin infusing to PICC with dressing intact.     Updated board with name and phone number; bed in the lowest position and locked. Call light and belongings within reach, all safety precaution in place. Assessment per flowsheet. Will continue to monitor.

## 2019-08-11 NOTE — PLAN OF CARE
Care assumed from SANDRA Mart RN at 1700. Pt tolerated 1u PRBC well, completed 1714. Pain managed with PRN medications. Good output from ostomy.

## 2019-08-11 NOTE — PROGRESS NOTES
GENERAL SURGERY  PROGRESS NOTE    Admit Date: 8/8/2019  Hospital Day: 3  Procedure:         SUBJECTIVE:     Patient is tolerating regular diet but had 1 episode of vomiting this morning. Patient attributed to continuing to eat and drink the same amount as before to maintain her hydration. Patient reports that her output has decreased since starting opium tincture and Loperamide. Output was 2.2L. OSH blood cultures grew E.Coli and was resistant to Zosyn. Patient was switched from Zosyn to Cefepime. Current blood cultures drawn was negative. Denies fever, chills, chest pain or SOB. H/H was 6.8/23.6 and consents were signed for transfusion.       Continuous Infusions:   Amino acid 5% - dextrose 15% (CLINIMIX-E) solution with additives (1L  provides 510 kcal/L dextrose, with 50 gm AA, 150 gm CHO, Na 35, K 30, Mg 5, Ca 4.5, Acetate 80, Cl 39, Phos 15) 50 mL/hr at 08/10/19 2351    octreotide infusion (50 mcg/mL) 250 mcg/hr (08/11/19 0454)     Scheduled Meds:   acyclovir  400 mg Oral BID    ceFEPime (MAXIPIME) IVPB  2 g Intravenous Q8H    enoxaparin  50 mg Subcutaneous Q12H    famotidine (PF)  20 mg Intravenous BID    lidocaine (PF) 10 mg/ml (1%)  1 mL Other Once    loperamide  2 mg Oral TID    magnesium sulfate IVPB  2 g Intravenous Once    opium tincture  6 mg Oral TID    oxandrolone  10 mg Oral BID    sodium phosphate IVPB  30 mmol Intravenous Once    sucralfate  1 g Oral QID (WM & HS)         OBJECTIVE:     Vital Signs (Most Recent)  Temp: 97.9 °F (36.6 °C) (08/11/19 0700)  Pulse: 80 (08/11/19 0700)  Resp: 20 (08/11/19 0700)  BP: (!) 88/61 (08/11/19 0700)  SpO2: 99 % (08/11/19 0417)    Vital Signs Range (Last 24H):  Temp:  [97.5 °F (36.4 °C)-98.6 °F (37 °C)]   Pulse:  [79-86]   Resp:  [17-21]   BP: ()/(56-71)   SpO2:  [99 %-100 %]     I & O (Last 24H):    Intake/Output Summary (Last 24 hours) at 8/11/2019 0856  Last data filed at 8/11/2019 0650  Gross per 24 hour   Intake 6346.65 ml   Output 3350 ml    Net 2996.65 ml       Physical Exam:    Constitutional: She appears well-developed and well-nourished. No distress.   HENT:   Head: Normocephalic and atraumatic.   Eyes: Conjunctivae are normal.   Neck: Neck supple.   Cardiovascular: Normal rate, regular rhythm and intact distal pulses.   Pulmonary/Chest: Effort normal. No respiratory distress.   Abdominal: Soft. She exhibits no distension. There is no rebound and no guarding.   Jejunostomy to LUQ with gas/liquid stool to bag   Skin: She is not diaphoretic.   Nursing note and vitals reviewed.      Inpatient Data      Vitals:   Temp:  [97.5 °F (36.4 °C)-98.6 °F (37 °C)]   Pulse:  [79-86]   Resp:  [17-21]   BP: ()/(56-71)   SpO2:  [99 %-100 %]     Diet Adult Regular (IDDSI Level 7)  Amino acid 5% - dextrose 15% (CLINIMIX-E) solution with additives.  CENTRAL LINE ONLY (1395 mOsm/L). 1L provides 50 gm AA, 150 gm CHO (510 kcal/L dextrose), Na 35, K 30, Mg 5, Ca 4.5, Acetate 80, Cl 39, Phos 15.     Intake/Output Summary (Last 24 hours) at 8/11/2019 0856  Last data filed at 8/11/2019 0650  Gross per 24 hour   Intake 6346.65 ml   Output 3350 ml   Net 2996.65 ml          Recent Labs     08/09/19  0634 08/10/19  0857 08/11/19  0500   WBC 6.18 5.98 7.20   HGB 7.2* 7.2* 6.8*   HCT 24.0* 24.0* 23.6*   * 541* 542*   * 138 138   K 3.8 3.4* 4.1    106 108   CO2 28 25 26   BUN 10 7 6   CREATININE 0.6 0.6 0.6   BILITOT 0.2 0.2 0.1   AST 23 13 15   ALT 34 24 19   ALKPHOS 183* 182* 161*   CALCIUM 7.7* 7.9* 7.9*   ALBUMIN 1.8* 2.0* 2.0*   PROT 5.6* 5.7* 5.6*   MG 1.8 1.8 1.9   PHOS 3.5 3.4 2.7     Scheduled Meds:     acyclovir 400 mg BID   ceFEPime (MAXIPIME) IVPB 2 g Q8H   enoxaparin 50 mg Q12H   famotidine (PF) 20 mg BID   lidocaine (PF) 10 mg/ml (1%) 1 mL Once   loperamide 2 mg TID   magnesium sulfate IVPB 2 g Once   opium tincture 6 mg TID   oxandrolone 10 mg BID   sodium phosphate IVPB 30 mmol Once   sucralfate 1 g QID (WM & HS)      Amino acid 5% -  dextrose 15% (CLINIMIX-E) solution with additives (1L  provides 510 kcal/L dextrose, with 50 gm AA, 150 gm CHO, Na 35, K 30, Mg 5, Ca 4.5, Acetate 80, Cl 39, Phos 15) 50 mL/hr at 08/10/19 2351    octreotide infusion (50 mcg/mL) 250 mcg/hr (08/11/19 0454)        Lines/Drains:       PICC Double Lumen right brachial (Active)   Site Assessment Clean;Dry;Intact 8/9/2019  8:11 PM   Lumen 1 Status Flushed;Infusing 8/9/2019  8:11 PM   Lumen 2 Status Flushed;Infusing 8/9/2019  8:11 PM   Dressing Type Transparent 8/9/2019  8:11 PM   Dressing Status Biopatch in place;Clean;Dry;Intact 8/9/2019  8:11 PM   Dressing Change Due 08/15/19 8/9/2019  8:11 PM   Daily Line Review Performed 8/9/2019  8:11 PM   Number of days:             Colostomy 08/08/19 1900 LUQ (Active)   Stomal Appliance 1 piece 8/9/2019  8:11 PM   Stoma Appearance round 8/9/2019  8:11 PM   Site Assessment Clean;Intact 8/9/2019  8:11 PM   Peristomal Assessment Clean;Intact 8/9/2019  8:11 PM   Stoma Function flatus;stool 8/9/2019  8:11 PM   Tolerance no signs/symptoms of discomfort 8/9/2019  8:11 PM   Output (mL) 225 mL 8/9/2019 11:17 PM   Number of days: 1       ASSESSMENT/PLAN:       43 y.o. woman transferred from OSH with abdominal pain and sepsis (GNR). She has a history of eddie-en-y gastric bypass 13 years ago, DM and small bowel volvulus with ischemia s/p ex lap with ischemia to significant portion of small bowel and subsequent resection with jejunostomy ~1 month ago presenting for E. Coli bacteremia. Started on a bowel regimen. Found with anemia this morning.      Advance diet to Regular  Continue TPN  Continue bowel regimen with opium tincture and loperamide  Continue Strict I&Os  Pain/nausea control PRN  Electrolytes repleted PRN  Zosyn changed to Cefepime due to OSH cultures per ID, appreciate recs  H/H: 6.8/23.6, blood consents signed and 1uRBC ordered for transfusion  Surgical planning once bacteremia resolves      Halina Spencer, PGY-2  [facility]  08/11/2019

## 2019-08-11 NOTE — PLAN OF CARE
Problem: Adult Inpatient Plan of Care  Goal: Plan of Care Review  Outcome: Ongoing (interventions implemented as appropriate)  Patient safety maintained. Pain controlled with oxycodone. TPN and Sandostatin maintained. 1 unit of RBC infused.   No N&V. No distress noted.

## 2019-08-12 LAB
ALBUMIN SERPL BCP-MCNC: 2.1 G/DL (ref 3.5–5.2)
ALP SERPL-CCNC: 149 U/L (ref 55–135)
ALT SERPL W/O P-5'-P-CCNC: 19 U/L (ref 10–44)
ANION GAP SERPL CALC-SCNC: 6 MMOL/L (ref 8–16)
AST SERPL-CCNC: 22 U/L (ref 10–40)
BASOPHILS # BLD AUTO: 0.03 K/UL (ref 0–0.2)
BASOPHILS NFR BLD: 0.4 % (ref 0–1.9)
BILIRUB SERPL-MCNC: 0.4 MG/DL (ref 0.1–1)
BUN SERPL-MCNC: 8 MG/DL (ref 6–20)
CALCIUM SERPL-MCNC: 8.3 MG/DL (ref 8.7–10.5)
CHLORIDE SERPL-SCNC: 109 MMOL/L (ref 95–110)
CO2 SERPL-SCNC: 28 MMOL/L (ref 23–29)
CREAT SERPL-MCNC: 0.6 MG/DL (ref 0.5–1.4)
DIFFERENTIAL METHOD: ABNORMAL
EOSINOPHIL # BLD AUTO: 0.3 K/UL (ref 0–0.5)
EOSINOPHIL NFR BLD: 3.3 % (ref 0–8)
ERYTHROCYTE [DISTWIDTH] IN BLOOD BY AUTOMATED COUNT: 17.3 % (ref 11.5–14.5)
EST. GFR  (AFRICAN AMERICAN): >60 ML/MIN/1.73 M^2
EST. GFR  (NON AFRICAN AMERICAN): >60 ML/MIN/1.73 M^2
GLUCOSE SERPL-MCNC: 120 MG/DL (ref 70–110)
HCT VFR BLD AUTO: 26.5 % (ref 37–48.5)
HGB BLD-MCNC: 7.7 G/DL (ref 12–16)
LYMPHOCYTES # BLD AUTO: 1.8 K/UL (ref 1–4.8)
LYMPHOCYTES NFR BLD: 23.6 % (ref 18–48)
MAGNESIUM SERPL-MCNC: 2.1 MG/DL (ref 1.6–2.6)
MCH RBC QN AUTO: 22.9 PG (ref 27–31)
MCHC RBC AUTO-ENTMCNC: 29.1 G/DL (ref 32–36)
MCV RBC AUTO: 79 FL (ref 82–98)
MONOCYTES # BLD AUTO: 0.6 K/UL (ref 0.3–1)
MONOCYTES NFR BLD: 7.5 % (ref 4–15)
NEUTROPHILS # BLD AUTO: 4.8 K/UL (ref 1.8–7.7)
NEUTROPHILS NFR BLD: 65.2 % (ref 38–73)
PHOSPHATE SERPL-MCNC: 3.9 MG/DL (ref 2.7–4.5)
PLATELET # BLD AUTO: 478 K/UL (ref 150–350)
PMV BLD AUTO: 8.2 FL (ref 9.2–12.9)
POTASSIUM SERPL-SCNC: 4.3 MMOL/L (ref 3.5–5.1)
PREALB SERPL-MCNC: 14 MG/DL (ref 20–43)
PROT SERPL-MCNC: 5.6 G/DL (ref 6–8.4)
RBC # BLD AUTO: 3.36 M/UL (ref 4–5.4)
SODIUM SERPL-SCNC: 143 MMOL/L (ref 136–145)
WBC # BLD AUTO: 7.47 K/UL (ref 3.9–12.7)

## 2019-08-12 PROCEDURE — 84134 ASSAY OF PREALBUMIN: CPT

## 2019-08-12 PROCEDURE — 25000003 PHARM REV CODE 250: Performed by: STUDENT IN AN ORGANIZED HEALTH CARE EDUCATION/TRAINING PROGRAM

## 2019-08-12 PROCEDURE — 80053 COMPREHEN METABOLIC PANEL: CPT

## 2019-08-12 PROCEDURE — 63600175 PHARM REV CODE 636 W HCPCS: Performed by: STUDENT IN AN ORGANIZED HEALTH CARE EDUCATION/TRAINING PROGRAM

## 2019-08-12 PROCEDURE — 84100 ASSAY OF PHOSPHORUS: CPT

## 2019-08-12 PROCEDURE — 25000003 PHARM REV CODE 250: Performed by: SURGERY

## 2019-08-12 PROCEDURE — 97803 MED NUTRITION INDIV SUBSEQ: CPT

## 2019-08-12 PROCEDURE — 85025 COMPLETE CBC W/AUTO DIFF WBC: CPT

## 2019-08-12 PROCEDURE — 11000001 HC ACUTE MED/SURG PRIVATE ROOM

## 2019-08-12 PROCEDURE — 83735 ASSAY OF MAGNESIUM: CPT

## 2019-08-12 PROCEDURE — 94761 N-INVAS EAR/PLS OXIMETRY MLT: CPT

## 2019-08-12 PROCEDURE — S0028 INJECTION, FAMOTIDINE, 20 MG: HCPCS | Performed by: STUDENT IN AN ORGANIZED HEALTH CARE EDUCATION/TRAINING PROGRAM

## 2019-08-12 PROCEDURE — A4216 STERILE WATER/SALINE, 10 ML: HCPCS | Performed by: STUDENT IN AN ORGANIZED HEALTH CARE EDUCATION/TRAINING PROGRAM

## 2019-08-12 RX ADMIN — OXYCODONE HYDROCHLORIDE 10 MG: 5 TABLET ORAL at 06:08

## 2019-08-12 RX ADMIN — ASCORBIC ACID, VITAMIN A PALMITATE, CHOLECALCIFEROL, THIAMINE HYDROCHLORIDE, RIBOFLAVIN-5 PHOSPHATE SODIUM, PYRIDOXINE HYDROCHLORIDE, NIACINAMIDE, DEXPANTHENOL, ALPHA-TOCOPHEROL ACETATE, VITAMIN K1, FOLIC ACID, BIOTIN, CYANOCOBALAMIN: 200; 3300; 200; 6; 3.6; 6; 40; 15; 10; 150; 600; 60; 5 INJECTION, SOLUTION INTRAVENOUS at 10:08

## 2019-08-12 RX ADMIN — HYDROMORPHONE HYDROCHLORIDE 1 MG: 2 INJECTION INTRAMUSCULAR; INTRAVENOUS; SUBCUTANEOUS at 12:08

## 2019-08-12 RX ADMIN — LOPERAMIDE HYDROCHLORIDE 2 MG: 2 CAPSULE ORAL at 03:08

## 2019-08-12 RX ADMIN — SUCRALFATE 1 G: 1 SUSPENSION ORAL at 05:08

## 2019-08-12 RX ADMIN — ACETAMINOPHEN 650 MG: 325 TABLET ORAL at 03:08

## 2019-08-12 RX ADMIN — HYDROMORPHONE HYDROCHLORIDE 1 MG: 2 INJECTION INTRAMUSCULAR; INTRAVENOUS; SUBCUTANEOUS at 07:08

## 2019-08-12 RX ADMIN — LOPERAMIDE HYDROCHLORIDE 2 MG: 2 CAPSULE ORAL at 09:08

## 2019-08-12 RX ADMIN — MORPHINE 6 MG: 10 SOLUTION ORAL at 09:08

## 2019-08-12 RX ADMIN — Medication 10 ML: at 10:08

## 2019-08-12 RX ADMIN — ONDANSETRON 8 MG: 8 TABLET, ORALLY DISINTEGRATING ORAL at 10:08

## 2019-08-12 RX ADMIN — OXYCODONE HYDROCHLORIDE 10 MG: 5 TABLET ORAL at 03:08

## 2019-08-12 RX ADMIN — ACYCLOVIR 400 MG: 200 CAPSULE ORAL at 09:08

## 2019-08-12 RX ADMIN — FAMOTIDINE 20 MG: 10 INJECTION, SOLUTION INTRAVENOUS at 09:08

## 2019-08-12 RX ADMIN — SUCRALFATE 1 G: 1 SUSPENSION ORAL at 09:08

## 2019-08-12 RX ADMIN — OXYCODONE HYDROCHLORIDE 10 MG: 5 TABLET ORAL at 10:08

## 2019-08-12 RX ADMIN — SUCRALFATE 1 G: 1 SUSPENSION ORAL at 12:08

## 2019-08-12 RX ADMIN — OCTREOTIDE ACETATE 250 MCG/HR: 1000 INJECTION, SOLUTION INTRAVENOUS; SUBCUTANEOUS at 01:08

## 2019-08-12 RX ADMIN — CEFEPIME HYDROCHLORIDE 2 G: 2 INJECTION, POWDER, FOR SOLUTION INTRAVENOUS at 12:08

## 2019-08-12 RX ADMIN — ZOLPIDEM TARTRATE 5 MG: 5 TABLET ORAL at 10:08

## 2019-08-12 RX ADMIN — ACETAMINOPHEN 650 MG: 325 TABLET ORAL at 07:08

## 2019-08-12 RX ADMIN — SUCRALFATE 1 G: 1 SUSPENSION ORAL at 07:08

## 2019-08-12 RX ADMIN — ASCORBIC ACID, VITAMIN A PALMITATE, CHOLECALCIFEROL, THIAMINE HYDROCHLORIDE, RIBOFLAVIN-5 PHOSPHATE SODIUM, PYRIDOXINE HYDROCHLORIDE, NIACINAMIDE, DEXPANTHENOL, ALPHA-TOCOPHEROL ACETATE, VITAMIN K1, FOLIC ACID, BIOTIN, CYANOCOBALAMIN: 200; 3300; 200; 6; 3.6; 6; 40; 15; 10; 150; 600; 60; 5 INJECTION, SOLUTION INTRAVENOUS at 12:08

## 2019-08-12 RX ADMIN — OXANDROLONE 10 MG: 2.5 TABLET ORAL at 09:08

## 2019-08-12 RX ADMIN — CEFEPIME HYDROCHLORIDE 2 G: 2 INJECTION, POWDER, FOR SOLUTION INTRAVENOUS at 06:08

## 2019-08-12 RX ADMIN — CEFEPIME HYDROCHLORIDE 2 G: 2 INJECTION, POWDER, FOR SOLUTION INTRAVENOUS at 10:08

## 2019-08-12 RX ADMIN — HYDROMORPHONE HYDROCHLORIDE 1 MG: 2 INJECTION INTRAMUSCULAR; INTRAVENOUS; SUBCUTANEOUS at 05:08

## 2019-08-12 RX ADMIN — ENOXAPARIN SODIUM 50 MG: 100 INJECTION SUBCUTANEOUS at 09:08

## 2019-08-12 RX ADMIN — CYANOCOBALAMIN 1000 MCG: 1000 INJECTION, SOLUTION INTRAMUSCULAR at 09:08

## 2019-08-12 RX ADMIN — MORPHINE 6 MG: 10 SOLUTION ORAL at 03:08

## 2019-08-12 RX ADMIN — ONDANSETRON 8 MG: 8 TABLET, ORALLY DISINTEGRATING ORAL at 12:08

## 2019-08-12 NOTE — PLAN OF CARE
Problem: Adult Inpatient Plan of Care  Goal: Plan of Care Review  Outcome: Ongoing (interventions implemented as appropriate)  Patient is resting. Medications given per orders in MAR. PRN medication given per orders. Safety maintained,  at bedside, remained free from falls. One episode of vomitting noted, PRN medication given.

## 2019-08-12 NOTE — PLAN OF CARE
Progress notes reviewed. Rounds completed. Introduced self as VN for this shift. Educated pt on VN's role in pt care. Educated pt about VTE and fall precautions.  Opportunity given for pt's questions. No questions or concerns expressed at this time.      08/12/19 1044   Type of Frequent Check   Type Other (see comments)  (vn rounds)   Safety/Activity   Patient Rounds visualized patient   Safety Promotion/Fall Prevention Fall Risk reviewed with patient/family;instructed to call staff for mobility   Positioning   Body Position other (see comments)  (sitting on couch in room)   Pain/Comfort/Sleep   Pain Rating (0-10): Rest 0   RASS (Chavis Agitation-Sedation Scale) 0-->alert and calm

## 2019-08-12 NOTE — PLAN OF CARE
Problem: Adult Inpatient Plan of Care  Goal: Plan of Care Review  Outcome: Ongoing (interventions implemented as appropriate)  Pt remains A+Ox4, ambulates well. Medicated for c/o headache, pain with PRN medications. Episode of emesis x2 noted, no nausea prior. Discussed with team, pt encouraged to spread intake throughout day. TPN and IV anna remain in place. PICC remains CDI. Safety precautions maintained.

## 2019-08-12 NOTE — CONSULTS
"  [facility]  Adult Nutrition  Consult Note    SUMMARY     Recommendations    Recommendation:   1. D/C Boost Plus. Add Optisource instead.   2. Continue to encourage po intake as tolerated.   3. Cotninue TPN for now in preparation for surgery.  4. MD requesting Enterade. Working on getting Enterade available in hospital formulary    Goals:  1. Pt will tolerate TPN.   2. Pt will tolerate po diet with at least 50% intake at meals.  Nutrition Goal Status: progressing towards goal  Communication of RD Recs: reviewed with RN(Skylar)    Reason for Assessment  Reason For Assessment: consult(hx short gut syndrome)  Diagnosis: (sepsis)  Relevant Medical History: DM, anxiety, ADD, gastric bypass, expl lap, cholecystectomy  General Information Comments: Consult received for nutl recs. Already following pt. Pt on Regular diet. Tolerating po intake well, Receiving Boost Plus- would like to change her to Optisource. Provided her with one to try. +colostomy. MD would like pt to receive Enterade (not currently in hospital formulary-bit working on getting it available. NFPE completed 8/10- mild wasting of temples & clavicles  Nutrition Discharge Planning: pt to d/c on Regular diet & possible TPN    Nutrition Risk Screen  Nutrition Risk Screen: tube feeding or parenteral nutrition    Nutrition/Diet History  Food Preferences: no Nondenominational or cultural food prefs identified  Spiritual, Cultural Beliefs, Muslim Practices, Values that Affect Care: no  Factors Affecting Nutritional Intake: altered gastrointestinal function    Anthropometrics  Temp: 97.6 °F (36.4 °C)  Height Method: Stated  Height: 5' 5" (165.1 cm)  Height (inches): 65 in  Weight Method: Bed Scale  Weight: 82.6 kg (182 lb 1.6 oz)  Weight (lb): 182.1 lb  Ideal Body Weight (IBW), Female: 125 lb  % Ideal Body Weight, Female (lb): 134.74 lb  BMI (Calculated): 28.1  BMI Grade: 25 - 29.9 - overweight  Usual Body Weight (UBW), k.8 kg  % Usual Body " Weight: 93.59  % Weight Change From Usual Weight: -6.6 %     Lab/Procedures/Meds  Pertinent Labs Reviewed: reviewed  Pertinent Labs Comments: Glu 120H, Ca 8.3L, Alb 2.1L, PAB 14L  Pertinent Medications Reviewed: reviewed  Pertinent Medications Comments: opiumtincture, loperamide    Estimated/Assessed Needs  Weight Used For Calorie Calculations: 82.6 kg (182 lb 1.6 oz)  Energy Calorie Requirements (kcal): 1919  Energy Need Method: Hague-St Jeor(x1.3)  Protein Requirements: 83g (1.0g/kg)  Weight Used For Protein Calculations: 82.6 kg (182 lb 1.6 oz)  Estimated Fluid Requirement Method: RDA Method  RDA Method (mL): 1919     Nutrition Prescription Ordered  Current Diet Order: Regular  Current Nutrition Support Formula Ordered: Clinimix E 5/15  Current Nutrition Support Rate Ordered: 50 (ml)  Current Nutrition Support Frequency Ordered: ml/hr  Oral Nutrition Supplement: Boost Plus, Boost pudding    Evaluation of Received Nutrient/Fluid Intake  Parenteral Calories (kcal): 852  Parenteral Protein (gm): 60  Parenteral Fluid (mL): 1200  GIR (Glucose Infusion Rate) (mg/kg/min): 1.5 mg/kg/min  % Kcal Needs: 44  % Protein Needs: 72  I/O: 2297/2750  Energy Calories Required: not meeting needs  Protein Required: not meeting needs  Fluid Required: not meeting needs  Comments: LBM 8/11  % Intake of Estimated Energy Needs: 25 - 50 %  % Meal Intake: 25 - 50 %    Nutrition Risk  Level of Risk/Frequency of Follow-up: (2xweekly)     Assessment and Plan    Protein calorie malnutrition  Malnutrition in the context of Acute Illness/Injury    Related to (etiology):  S/p surgery/sepsis    Signs and Symptoms (as evidenced by):  Energy Intake: <50% of estimated energy requirement for 1 month  Muscle Mass Depletion: mild depletion of temples, clavicle region and scapular region   Weight Loss: 6% x 1 month     Interventions:  Parenteral nutrition  Commercial beverage    Nutrition Diagnosis Status:  Continues       Monitor and Evaluation  Food  and Nutrient Intake: food and beverage intake, parenteral nutrition intake  Food and Nutrient Adminstration: diet order, enteral and parenteral nutrition administration  Physical Activity and Function: nutrition-related ADLs and IADLs  Anthropometric Measurements: weight  Biochemical Data, Medical Tests and Procedures: electrolyte and renal panel  Nutrition-Focused Physical Findings: overall appearance     Malnutrition Assessment  Malnutrition Type: acute illness or injury  Weight Loss (Malnutrition): greater than 5% in 1 month   [place] (Muscle Loss): mild depletion  Clavicle Bone Region (Muscle Loss): mild depletion   Subcutaneous Fat Loss (Final Summary): well nourished  Muscle Loss Evaluation (Final Summary): mild protein-calorie malnutrition       Nutrition Follow-Up  RD Follow-up?: Yes

## 2019-08-12 NOTE — PROGRESS NOTES
[facility] Infectious Disease Progress Note     Primary Team: Dr. Fuchs  Consultant Attending: Dr. Fraser  Date of Admit: 8/8/2019    Summary of history         43 y.o. woman transferred from OSH with abdominal pain and sepsis (GNR). 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 Patient states that this was performed at [facility] in [place], [place]. Her stay was complicated by bilateral pulmonary embolisms. Patient reports having no issues with gastric bypass until episode of abdominal pain and resulting surgery last month. She now reports having a few days of abdominal pain with associated nausea/vomiting, fever/chills. Patient has been on TPN at home since discharge from hospital last month via PICC line. E PICC line removed yesterday due to bacterimia and [place] [facility] Patient states that she had blood cultures drawn on 8/7 which showed GNR on 8/8 and she was started on Zosyn prior to transfer to [place]. Patient has had several readmission to [facility] since her surgery and states that she has been treated with courses of Zosyn and Zyvox repeatedly during this time. She states that she has been taking her axillary temperature at home since June and that she has been intermittently febrile over the past two months with temperatures reaching 103. Neuroendocrine service is considering reconnection of current alimentary tract vs small intestine transplant.    Interval events     In the interim patient is tolerating her bacteremia treatment well. No fever or leukocytosis noted. Blood cultures obtained on 8/10 at our facility are showing no growth thus far.    Subjective     Patient is resting comfortably. She does report 2 episodes of spontaneous non blood emesis since yesterday that were not associated with  preceding nausea. No other complaints noted.    Current Medications:     Infusions:   Amino acid 5% - dextrose 15% (CLINIMIX-E) solution with additives (1L  provides 510 kcal/L dextrose, with 50 gm AA, 150 gm CHO, Na 35, K 30, Mg 5, Ca 4.5, Acetate 80, Cl 39, Phos 15) 50 mL/hr at 19 0008    Amino acid 5% - dextrose 15% (CLINIMIX-E) solution with additives (1L  provides 510 kcal/L dextrose, with 50 gm AA, 150 gm CHO, Na 35, K 30, Mg 5, Ca 4.5, Acetate 80, Cl 39, Phos 15)      octreotide infusion (50 mcg/mL) 250 mcg/hr (19 0159)        Scheduled:   acyclovir  400 mg Oral BID    ceFEPime (MAXIPIME) IVPB  2 g Intravenous Q8H    cyanocobalamin  1,000 mcg Subcutaneous Daily    enoxaparin  50 mg Subcutaneous Q12H    famotidine (PF)  20 mg Intravenous BID    loperamide  2 mg Oral TID    opium tincture  6 mg Oral TID    oxandrolone  10 mg Oral BID    sucralfate  1 g Oral QID (WM & HS)        PRN:  sodium chloride, acetaminophen, acetaminophen, HYDROmorphone, ondansetron, oxyCODONE, sodium chloride 0.9%, traMADol, zolpidem    Antibiotics and Day Number of Therapy:  Cefepime and Acyclovir    Objective   Last 24 Hour Vital Signs:  BP  Min: 105/69  Max: 123/64  Temp  Av.9 °F (36.6 °C)  Min: 97.6 °F (36.4 °C)  Max: 98.2 °F (36.8 °C)  Pulse  Av.6  Min: 68  Max: 77  Resp  Av.4  Min: 12  Max: 18  SpO2  Av %  Min: 96 %  Max: 100 %    Lines, drains, airway:  Colostomy LUQ  Right brachial PICC line    Physical Examination:  Examination  General: well appearing, comfortable   HEENT: normal oral mucosa, normal dentition, conjunctiva normal, pupils normal, extraocular motion normal  Neck: no thyromegaly, no JVD   Cardiac: no murmurs, pulse regular    Pulmonary/Chest: chest clear, no respiratory distress   GI/Rectal: no organomegaly, no masses, non tender, normal bowel sounds, rectal exam deferred   : deferred   Msk: normal motor screening exam  Vascular: normal peripheral perfusion   Lymph  nodes: none palpated  Skin/ Extremities: no rash, no pedal edema, no ulceration    Neurology/ Mental status: alert oriented     Lab data:  CBC:   Lab Results   Component Value Date    WBC 7.47 08/12/2019    HGB 7.7 (L) 08/12/2019     (H) 08/12/2019    MCV 79 (L) 08/12/2019    RDW 17.3 (H) 08/12/2019       Estimated Creatinine Clearance: 126.9 mL/min (based on SCr of 0.6 mg/dL).    Microbiology Data  Microbiology Results (last 7 days)     Procedure Component Value Units Date/Time    Blood culture [[id number]] Collected:  08/10/19 0936    Order Status:  Completed Specimen:  Blood from Line, PICC Left Brachial Updated:  08/11/19 1812     Blood Culture, Routine No Growth to date      No Growth to date    Blood culture [[id number]] Collected:  08/10/19 0936    Order Status:  Completed Specimen:  Blood from Line, PICC Left Brachial Updated:  08/11/19 1812     Blood Culture, Routine No Growth to date      No Growth to date            Assessment     Parvin Corbin is a 43 y.o.female with past medical history as above. She is being treated for MDR E.coli bacteremia. Appears to be responding well to current treatment. Recommend completing 14 days of cefepime starting from 8/10.    Recommendations     MDR E.coli bacteremia  - Likely secondary to PICC line infection  - Blood cultures obtained on 8/10 at our facility are showing no growth  -Treating with Cefepime as per sensitivities from blood culture from 8/7  - Patient will require at least a 14 day total course of treatment from 8/10 forward    We will sign off at this time, please let us know if there are any changes in the patient's clinical course that require our attention    Thank you for this consult    Jim VALLEU ID Fellow

## 2019-08-12 NOTE — PROGRESS NOTES
Neuroendocrine Surgery  Progress Note    Hospital course: 43 y.o. woman transferred from OSH with abdominal pain and sepsis (GNR). 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  Hospital stay complicated by bilateral pulmonary embolisms. Patient reports having no issues with gastric bypass until episode of abdominal pain and resulting surgery last month. She now reports having a few days of abdominal pain with associated nausea/vomiting, fever/chills. Patient has been on TPN at home since discharge from hospital last month via PICC line. RUE PICC line removed yesterday due to suspected sepsis and new PICC placed to [facility]. TPN initiated on HD 2. Blood cultures grew multiresistant E coli, antibiotic therapy changed from zosyn to cefepime.     S:  NAEO. AFVSS  Output from jejunostomy decreased since starting loperamide and tincture of opium  Denies n/v  No f/c, cp/sob  +gas/stool to ostomy  Patient ambulating/voiding without issue    O:  Temp:  [97.8 °F (36.6 °C)-98.5 °F (36.9 °C)] 97.8 °F (36.6 °C)  Pulse:  [68-83] 75  Resp:  [12-20] 12  SpO2:  [96 %-100 %] 96 %  BP: (105-123)/(64-79) 105/69    Physical Exam:  Gen: no acute distress. Alert and oriented x3.  HEENT: normocephalic and atraumatic. EOMI.   Resp: unlabored respirations  CV: regular rate  Abd: soft, jejunostomy to LUQ with liquid stool and gas to bag. Denies abdominal pain, non-distended  Ext: warm and well perfused  MSK: normal range of motion, normal strength  Neuro: no focal deficits, normal sensation    I/O:  Intake/Output - Last 3 Shifts       08/10 0700 - 08/11 0659 08/11 0700 - 08/12 0659 08/12 0700 - 08/13 0659    P.O. 1560 1100     I.V. (mL/kg) 3187.5 (38.6) 172.6 (2.1)     Blood  285.4     IV Piggyback 650 150     .2  589.2    Total Intake(mL/kg) 6346.7 (76.8) 1708 (20.7)  589.2 (7.1)    Urine (mL/kg/hr) 1075 (0.5) 1000 (0.5)     Emesis/NG output 0 50     Stool 2275 1700     Total Output 3350 2750     Net +2996.7 -1042 +589.2           Urine Occurrence 9 x 7 x     Emesis Occurrence 1 x          CBC:   Recent Labs     08/10/19  0857 08/11/19  0500 08/12/19  0530   WBC 5.98 7.20 7.47   HGB 7.2* 6.8* 7.7*   HCT 24.0* 23.6* 26.5*   * 542* 478*     CMP:  Recent Labs     08/10/19  0857 08/11/19  0500 08/12/19  0530    138 143   K 3.4* 4.1 4.3    108 109   CO2 25 26 28   * 158* 120*   BUN 7 6 8   CREATININE 0.6 0.6 0.6   CALCIUM 7.9* 7.9* 8.3*   BILITOT 0.2 0.1 0.4   ALKPHOS 182* 161* 149*   AST 13 15 22   ALT 24 19 19   ANIONGAP 7* 4* 6*     Recent Labs     08/10/19  0857 08/11/19  0500 08/12/19  0530   MG 1.8 1.9 2.1   PHOS 3.4 2.7 3.9     Micro:   Microbiology Results (last 7 days)     Procedure Component Value Units Date/Time    Blood culture [[id number]] Collected:  08/10/19 0936    Order Status:  Completed Specimen:  Blood from Line, PICC Left Brachial Updated:  08/11/19 1812     Blood Culture, Routine No Growth to date      No Growth to date    Blood culture [[id number]] Collected:  08/10/19 0936    Order Status:  Completed Specimen:  Blood from Line, PICC Left Brachial Updated:  08/11/19 1812     Blood Culture, Routine No Growth to date      No Growth to date        A/P: 43 y.o. 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 Patient with approx 15 cm jejunum distal to LOT therefore ability for nutritional absorption severely decreased   Continue bowel regimen with opium tincture and loperamide   Strict documentation of I/Os   Pain/nausea control PRN   Electrolyte replacement PRN   Continue cefepime    Surgical planning once  bacteremia resolves    Myles De Los Santos MD   [facility] General Surgery, PGY-2  08/12/2019

## 2019-08-12 NOTE — PLAN OF CARE
Problem: Parenteral Nutrition  Goal: Effective Intravenous Nutrition Therapy Delivery  Outcome: Ongoing (interventions implemented as appropriate)  Recommendation:   1. D/C Boost Plus. Add Optisource instead.   2. Continue to encourage po intake as tolerated.   3. Cotninue TPN for now in preparation for surgery.  4. MD requesting Enterade. Working on getting Enterade available in hospital formulary    Goals:  1. Pt will tolerate TPN.   2. Pt will tolerate po diet with at least 50% intake at meals.  Nutrition Goal Status: progressing towards goal  Communication of RD Recs: reviewed with RN(Skylar)

## 2019-08-12 NOTE — PROGRESS NOTES
Results for MOHINDER FRIEDMAN (MRN [id number]) as of 8/11/2019 22:00   Ref. Range 8/11/2019 05:00   Sodium Latest Ref Range: 136 - 145 mmol/L 138   Potassium Latest Ref Range: 3.5 - 5.1 mmol/L 4.1   Chloride Latest Ref Range: 95 - 110 mmol/L 108   CO2 Latest Ref Range: 23 - 29 mmol/L 26   Anion Gap Latest Ref Range: 8 - 16 mmol/L 4 (L)   BUN, Bld Latest Ref Range: 6 - 20 mg/dL 6   Creatinine Latest Ref Range: 0.5 - 1.4 mg/dL 0.6   eGFR if non African American Latest Ref Range: >60 mL/min/1.73 m^2 >60   eGFR if  Latest Ref Range: >60 mL/min/1.73 m^2 >60   Glucose Latest Ref Range: 70 - 110 mg/dL 158 (H)   Calcium Latest Ref Range: 8.7 - 10.5 mg/dL 7.9 (L)   Phosphorus Latest Ref Range: 2.7 - 4.5 mg/dL 2.7   Magnesium Latest Ref Range: 1.6 - 2.6 mg/dL 1.9   Alkaline Phosphatase Latest Ref Range: 55 - 135 U/L 161 (H)   PROTEIN TOTAL Latest Ref Range: 6.0 - 8.4 g/dL 5.6 (L)   Albumin Latest Ref Range: 3.5 - 5.2 g/dL 2.0 (L)   BILIRUBIN TOTAL Latest Ref Range: 0.1 - 1.0 mg/dL 0.1   AST Latest Ref Range: 10 - 40 U/L 15   ALT Latest Ref Range: 10 - 44 U/L 19   Reordered tpn
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
## 2019-08-13 PROBLEM — K91.89 COMPLICATIONS OF GASTRIC BYPASS SURGERY: Status: ACTIVE | Noted: 2019-08-13

## 2019-08-13 LAB
ALBUMIN SERPL BCP-MCNC: 2.2 G/DL (ref 3.5–5.2)
ALP SERPL-CCNC: 226 U/L (ref 55–135)
ALT SERPL W/O P-5'-P-CCNC: 37 U/L (ref 10–44)
ANION GAP SERPL CALC-SCNC: 6 MMOL/L (ref 8–16)
AST SERPL-CCNC: 55 U/L (ref 10–40)
BASOPHILS # BLD AUTO: 0.03 K/UL (ref 0–0.2)
BASOPHILS NFR BLD: 0.4 % (ref 0–1.9)
BILIRUB SERPL-MCNC: 0.2 MG/DL (ref 0.1–1)
BUN SERPL-MCNC: 9 MG/DL (ref 6–20)
CALCIUM SERPL-MCNC: 8.7 MG/DL (ref 8.7–10.5)
CHLORIDE SERPL-SCNC: 105 MMOL/L (ref 95–110)
CO2 SERPL-SCNC: 29 MMOL/L (ref 23–29)
CREAT SERPL-MCNC: 0.6 MG/DL (ref 0.5–1.4)
CRP SERPL-MCNC: 5.6 MG/L (ref 0–8.2)
DIFFERENTIAL METHOD: ABNORMAL
EOSINOPHIL # BLD AUTO: 0.3 K/UL (ref 0–0.5)
EOSINOPHIL NFR BLD: 3.2 % (ref 0–8)
ERYTHROCYTE [DISTWIDTH] IN BLOOD BY AUTOMATED COUNT: 17.7 % (ref 11.5–14.5)
EST. GFR  (AFRICAN AMERICAN): >60 ML/MIN/1.73 M^2
EST. GFR  (NON AFRICAN AMERICAN): >60 ML/MIN/1.73 M^2
GLUCOSE SERPL-MCNC: 96 MG/DL (ref 70–110)
HCT VFR BLD AUTO: 28.9 % (ref 37–48.5)
HGB BLD-MCNC: 8.4 G/DL (ref 12–16)
LYMPHOCYTES # BLD AUTO: 2.4 K/UL (ref 1–4.8)
LYMPHOCYTES NFR BLD: 30.2 % (ref 18–48)
MAGNESIUM SERPL-MCNC: 1.7 MG/DL (ref 1.6–2.6)
MCH RBC QN AUTO: 23.3 PG (ref 27–31)
MCHC RBC AUTO-ENTMCNC: 29.1 G/DL (ref 32–36)
MCV RBC AUTO: 80 FL (ref 82–98)
MONOCYTES # BLD AUTO: 0.5 K/UL (ref 0.3–1)
MONOCYTES NFR BLD: 6.1 % (ref 4–15)
NEUTROPHILS # BLD AUTO: 4.6 K/UL (ref 1.8–7.7)
NEUTROPHILS NFR BLD: 60.1 % (ref 38–73)
PHOSPHATE SERPL-MCNC: 3.8 MG/DL (ref 2.7–4.5)
PLATELET # BLD AUTO: 534 K/UL (ref 150–350)
PMV BLD AUTO: 8.4 FL (ref 9.2–12.9)
POTASSIUM SERPL-SCNC: 4.4 MMOL/L (ref 3.5–5.1)
PROT SERPL-MCNC: 6 G/DL (ref 6–8.4)
RBC # BLD AUTO: 3.61 M/UL (ref 4–5.4)
SODIUM SERPL-SCNC: 140 MMOL/L (ref 136–145)
TRIGL SERPL-MCNC: 142 MG/DL (ref 30–150)
WBC # BLD AUTO: 7.82 K/UL (ref 3.9–12.7)

## 2019-08-13 PROCEDURE — 84100 ASSAY OF PHOSPHORUS: CPT

## 2019-08-13 PROCEDURE — S0028 INJECTION, FAMOTIDINE, 20 MG: HCPCS | Performed by: STUDENT IN AN ORGANIZED HEALTH CARE EDUCATION/TRAINING PROGRAM

## 2019-08-13 PROCEDURE — 63600175 PHARM REV CODE 636 W HCPCS: Performed by: STUDENT IN AN ORGANIZED HEALTH CARE EDUCATION/TRAINING PROGRAM

## 2019-08-13 PROCEDURE — A4217 STERILE WATER/SALINE, 500 ML: HCPCS | Performed by: STUDENT IN AN ORGANIZED HEALTH CARE EDUCATION/TRAINING PROGRAM

## 2019-08-13 PROCEDURE — 25000003 PHARM REV CODE 250: Performed by: SURGERY

## 2019-08-13 PROCEDURE — 25000003 PHARM REV CODE 250: Performed by: STUDENT IN AN ORGANIZED HEALTH CARE EDUCATION/TRAINING PROGRAM

## 2019-08-13 PROCEDURE — 83735 ASSAY OF MAGNESIUM: CPT

## 2019-08-13 PROCEDURE — 94761 N-INVAS EAR/PLS OXIMETRY MLT: CPT

## 2019-08-13 PROCEDURE — 84478 ASSAY OF TRIGLYCERIDES: CPT

## 2019-08-13 PROCEDURE — 86140 C-REACTIVE PROTEIN: CPT

## 2019-08-13 PROCEDURE — 36415 COLL VENOUS BLD VENIPUNCTURE: CPT

## 2019-08-13 PROCEDURE — 80053 COMPREHEN METABOLIC PANEL: CPT

## 2019-08-13 PROCEDURE — 85025 COMPLETE CBC W/AUTO DIFF WBC: CPT

## 2019-08-13 PROCEDURE — 11000001 HC ACUTE MED/SURG PRIVATE ROOM

## 2019-08-13 PROCEDURE — B4185 PARENTERAL SOL 10 GM LIPIDS: HCPCS | Performed by: STUDENT IN AN ORGANIZED HEALTH CARE EDUCATION/TRAINING PROGRAM

## 2019-08-13 RX ORDER — MORPHINE 10 MG/ML
6 TINCTURE ORAL
Status: DISCONTINUED | OUTPATIENT
Start: 2019-08-14 | End: 2019-08-15

## 2019-08-13 RX ORDER — MICONAZOLE NITRATE 2 %
POWDER (GRAM) TOPICAL 2 TIMES DAILY
Status: DISCONTINUED | OUTPATIENT
Start: 2019-08-13 | End: 2019-09-01 | Stop reason: HOSPADM

## 2019-08-13 RX ADMIN — SODIUM CHLORIDE: 234 INJECTION INTRAMUSCULAR; INTRAVENOUS; SUBCUTANEOUS at 05:08

## 2019-08-13 RX ADMIN — MORPHINE 6 MG: 10 SOLUTION ORAL at 10:08

## 2019-08-13 RX ADMIN — ONDANSETRON 8 MG: 8 TABLET, ORALLY DISINTEGRATING ORAL at 06:08

## 2019-08-13 RX ADMIN — FAMOTIDINE 20 MG: 10 INJECTION, SOLUTION INTRAVENOUS at 10:08

## 2019-08-13 RX ADMIN — OXANDROLONE 10 MG: 2.5 TABLET ORAL at 08:08

## 2019-08-13 RX ADMIN — ACETAMINOPHEN 650 MG: 325 TABLET ORAL at 02:08

## 2019-08-13 RX ADMIN — OCTREOTIDE ACETATE 250 MCG/HR: 1000 INJECTION, SOLUTION INTRAVENOUS; SUBCUTANEOUS at 12:08

## 2019-08-13 RX ADMIN — OXYCODONE HYDROCHLORIDE 10 MG: 5 TABLET ORAL at 03:08

## 2019-08-13 RX ADMIN — MICONAZOLE NITRATE: 20 POWDER TOPICAL at 11:08

## 2019-08-13 RX ADMIN — ACYCLOVIR 400 MG: 200 CAPSULE ORAL at 08:08

## 2019-08-13 RX ADMIN — I.V. FAT EMULSION 250 ML: 20 EMULSION INTRAVENOUS at 10:08

## 2019-08-13 RX ADMIN — HYDROMORPHONE HYDROCHLORIDE 1 MG: 2 INJECTION INTRAMUSCULAR; INTRAVENOUS; SUBCUTANEOUS at 10:08

## 2019-08-13 RX ADMIN — ENOXAPARIN SODIUM 50 MG: 100 INJECTION SUBCUTANEOUS at 10:08

## 2019-08-13 RX ADMIN — PROMETHAZINE HYDROCHLORIDE 6.25 MG: 25 INJECTION INTRAMUSCULAR; INTRAVENOUS at 10:08

## 2019-08-13 RX ADMIN — SUCRALFATE 1 G: 1 SUSPENSION ORAL at 05:08

## 2019-08-13 RX ADMIN — CYANOCOBALAMIN 1000 MCG: 1000 INJECTION, SOLUTION INTRAMUSCULAR at 08:08

## 2019-08-13 RX ADMIN — OXANDROLONE 10 MG: 2.5 TABLET ORAL at 10:08

## 2019-08-13 RX ADMIN — LOPERAMIDE HYDROCHLORIDE 2 MG: 2 CAPSULE ORAL at 02:08

## 2019-08-13 RX ADMIN — MORPHINE 6 MG: 10 SOLUTION ORAL at 02:08

## 2019-08-13 RX ADMIN — MORPHINE 6 MG: 10 SOLUTION ORAL at 08:08

## 2019-08-13 RX ADMIN — LOPERAMIDE HYDROCHLORIDE 2 MG: 2 CAPSULE ORAL at 08:08

## 2019-08-13 RX ADMIN — FAMOTIDINE 20 MG: 10 INJECTION, SOLUTION INTRAVENOUS at 08:08

## 2019-08-13 RX ADMIN — OCTREOTIDE ACETATE 250 MCG/HR: 1000 INJECTION, SOLUTION INTRAVENOUS; SUBCUTANEOUS at 10:08

## 2019-08-13 RX ADMIN — ONDANSETRON 8 MG: 8 TABLET, ORALLY DISINTEGRATING ORAL at 08:08

## 2019-08-13 RX ADMIN — SUCRALFATE 1 G: 1 SUSPENSION ORAL at 12:08

## 2019-08-13 RX ADMIN — CEFEPIME HYDROCHLORIDE 2 G: 2 INJECTION, POWDER, FOR SOLUTION INTRAVENOUS at 10:08

## 2019-08-13 RX ADMIN — ENOXAPARIN SODIUM 50 MG: 100 INJECTION SUBCUTANEOUS at 08:08

## 2019-08-13 RX ADMIN — OXYCODONE HYDROCHLORIDE 10 MG: 5 TABLET ORAL at 08:08

## 2019-08-13 RX ADMIN — ACYCLOVIR 400 MG: 200 CAPSULE ORAL at 10:08

## 2019-08-13 RX ADMIN — LOPERAMIDE HYDROCHLORIDE 2 MG: 2 CAPSULE ORAL at 10:08

## 2019-08-13 RX ADMIN — ONDANSETRON 8 MG: 8 TABLET, ORALLY DISINTEGRATING ORAL at 09:08

## 2019-08-13 RX ADMIN — SUCRALFATE 1 G: 1 SUSPENSION ORAL at 10:08

## 2019-08-13 RX ADMIN — CEFEPIME HYDROCHLORIDE 2 G: 2 INJECTION, POWDER, FOR SOLUTION INTRAVENOUS at 12:08

## 2019-08-13 RX ADMIN — SUCRALFATE 1 G: 1 SUSPENSION ORAL at 08:08

## 2019-08-13 RX ADMIN — CEFEPIME HYDROCHLORIDE 2 G: 2 INJECTION, POWDER, FOR SOLUTION INTRAVENOUS at 04:08

## 2019-08-13 NOTE — PROGRESS NOTES
Neuroendocrine Surgery  Progress Note    Hospital course: 43 y.o. woman transferred from OSH with abdominal pain and sepsis (GNR). 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  Hospital stay complicated by bilateral pulmonary embolisms. Patient reports having no issues with gastric bypass until episode of abdominal pain and resulting surgery last month. She now reports having a few days of abdominal pain with associated nausea/vomiting, fever/chills. Patient has been on TPN at home since discharge from hospital last month via PICC line. RUE PICC line removed yesterday due to suspected sepsis and new PICC placed to [facility]. TPN initiated on HD 2. Blood cultures grew multiresistant E coli, antibiotic therapy changed from zosyn to cefepime.     S:  NAEO. AFVSS  Output from jejunostomy decreased since starting loperamide and tincture of opium  Denies n/v  No f/c, cp/sob  +gas/stool to ostomy  Patient ambulating/voiding without issue    O:  Temp:  [97.4 °F (36.3 °C)-98.6 °F (37 °C)] 98.6 °F (37 °C)  Pulse:  [69-83] 70  Resp:  [16-18] 18  SpO2:  [97 %-99 %] 98 %  BP: (101-126)/(57-78) 101/57    Physical Exam:  Gen: no acute distress. Alert and oriented x3.  HEENT: normocephalic and atraumatic. EOMI.   Resp: unlabored respirations  CV: regular rate  Abd: soft, jejunostomy to LUQ with liquid stool and gas to bag. Denies abdominal pain, non-distended  Ext: warm and well perfused  MSK: normal range of motion, normal strength  Neuro: no focal deficits, normal sensation    I/O:  Intake/Output - Last 3 Shifts       08/11 0700 - 08/12 0659 08/12 0700 - 08/13 0659 08/13 0700 - 08/14 0659    P.O. 1100 1925     I.V. (mL/kg) 172.6 (2.1) 177.4 (2.1)     Blood 285.4      IV Piggyback 150 150     TPN  1585     Total Intake(mL/kg) 1708 (20.7) 3837.4 (46.5)     Urine  (mL/kg/hr) 1000 (0.5) 2250 (1.1)     Emesis/NG output 50 500     Stool 1700 1150     Total Output 2750 3900     Net -1042 -62.6            Urine Occurrence 7 x 2 x     Stool Occurrence  0 x     Emesis Occurrence  2 x         CBC:   Recent Labs     08/11/19  0500 08/12/19  0530 08/13/19  0430   WBC 7.20 7.47 7.82   HGB 6.8* 7.7* 8.4*   HCT 23.6* 26.5* 28.9*   * 478* 534*     CMP:  Recent Labs     08/11/19  0500 08/12/19  0530 08/13/19  0430    143 140   K 4.1 4.3 4.4    109 105   CO2 26 28 29   * 120* 96   BUN 6 8 9   CREATININE 0.6 0.6 0.6   CALCIUM 7.9* 8.3* 8.7   BILITOT 0.1 0.4 0.2   ALKPHOS 161* 149* 226*   AST 15 22 55*   ALT 19 19 37   ANIONGAP 4* 6* 6*     Recent Labs     08/11/19  0500 08/12/19  0530 08/13/19  0430   MG 1.9 2.1 1.7   PHOS 2.7 3.9 3.8     Micro:   Microbiology Results (last 7 days)     Procedure Component Value Units Date/Time    Blood culture [[id number]] Collected:  08/10/19 0936    Order Status:  Completed Specimen:  Blood from Line, PICC Left Brachial Updated:  08/12/19 1812     Blood Culture, Routine No Growth to date      No Growth to date      No Growth to date    Blood culture [[id number]] Collected:  08/10/19 0936    Order Status:  Completed Specimen:  Blood from Line, PICC Left Brachial Updated:  08/12/19 1812     Blood Culture, Routine No Growth to date      No Growth to date      No Growth to date        A/P: 43 y.o. 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 Patient with approx 15 cm jejunum distal to LOT therefore ability for nutritional absorption severely decreased. Dietary working on getting Enterade available in formulary   Continue bowel regimen with opium tincture and loperamide   Strict documentation of  I/Os   Pain/nausea control PRN   Electrolyte replacement PRN   Continue cefepime    Surgical intervention to re-establish continuity of GI tract once optimized and negative blood cultures obtained    Myles De Los Santos MD   [facility] General Surgery, PGY-2  08/13/2019

## 2019-08-13 NOTE — PLAN OF CARE
Progress notes reviewed. Rounds completed. Introduced self as VN for this shift. Educated pt on VN's role in pt care. Educated pt about VTE and fall precautions.  Opportunity given for pt's questions. No questions or concerns expressed at this time.      08/13/19 1051   Type of Frequent Check   Type Other (see comments)  (vn rounds)   Safety/Activity   Patient Rounds visualized patient   Safety Promotion/Fall Prevention Fall Risk reviewed with patient/family;instructed to call staff for mobility   Positioning   Body Position supine, legs elevated   Head of Bed (HOB) HOB at 30-45 degrees   Pain/Comfort/Sleep   Pain Rating (0-10): Rest 0   RASS ([place] Agitation-Sedation Scale) 0-->alert and calm

## 2019-08-13 NOTE — PROGRESS NOTES
Pharmacy New Medication Education    Patient and/or Caregiver ACCEPTED medication education.    Pharmacy has provided education on the name, indication, and possible side effects of the medication(s) prescribed, using teach-back method.     Learners of pharmacy medication education includes:  patient and spouse    Medication Indication Side Effects   Opium tincture diarrhea sleepiness and constipation   oxandrolone Appetite stimulant/weight gain adjunct weight gain       The following medications have also been discussed, during this admission.     Current Facility-Administered Medications   Medication Frequency    0.9%  NaCl infusion (for blood administration) Q24H PRN    acetaminophen tablet 650 mg Q8H PRN    acetaminophen tablet 650 mg Q4H PRN    acyclovir capsule 400 mg BID    Amino acid 5% - dextrose 15% (CLINIMIX-E) solution with additives (1L  provides 510 kcal/L dextrose, with 50 gm AA, 150 gm CHO, Na 35, K 30, Mg 5, Ca 4.5, Acetate 80, Cl 39, Phos 15) Continuous    ceFEPIme in dextrose 5% 2 gram/50 mL IVPB 2 g Q8H    cyanocobalamin injection 1,000 mcg Daily    enoxaparin injection 50 mg Q12H    famotidine (PF) injection 20 mg BID    hydromorphone (PF) injection 1 mg Q4H PRN    loperamide capsule 2 mg TID    octreotide (SANDOSTATIN) 5,000 mcg in sodium chloride 0.9% 100 mL infusion Continuous    ondansetron disintegrating tablet 8 mg Q8H PRN    opium tincture 6 mg TID    oxandrolone tablet 10 mg BID    oxyCODONE immediate release tablet 10 mg Q4H PRN    promethazine (PHENERGAN) 6.25 mg in dextrose 5 % 50 mL IVPB Q6H PRN    sodium chloride 0.9% flush 10 mL PRN    sucralfate 100 mg/mL suspension 1 g QID (WM & HS)    TPN ADULT CENTRAL LINE CUSTOM Continuous    traMADol tablet 50 mg Q6H PRN    zolpidem tablet 5 mg Nightly PRN          Thank you  Linda Giles, DavidD

## 2019-08-14 LAB
ALBUMIN SERPL BCP-MCNC: 2.3 G/DL (ref 3.5–5.2)
ALP SERPL-CCNC: 188 U/L (ref 55–135)
ALT SERPL W/O P-5'-P-CCNC: 27 U/L (ref 10–44)
ANION GAP SERPL CALC-SCNC: 6 MMOL/L (ref 8–16)
AST SERPL-CCNC: 27 U/L (ref 10–40)
BASOPHILS # BLD AUTO: 0.03 K/UL (ref 0–0.2)
BASOPHILS NFR BLD: 0.4 % (ref 0–1.9)
BILIRUB SERPL-MCNC: 0.2 MG/DL (ref 0.1–1)
BUN SERPL-MCNC: 12 MG/DL (ref 6–20)
CALCIUM SERPL-MCNC: 8.7 MG/DL (ref 8.7–10.5)
CHLORIDE SERPL-SCNC: 103 MMOL/L (ref 95–110)
CO2 SERPL-SCNC: 29 MMOL/L (ref 23–29)
CREAT SERPL-MCNC: 0.7 MG/DL (ref 0.5–1.4)
DIFFERENTIAL METHOD: ABNORMAL
EOSINOPHIL # BLD AUTO: 0.2 K/UL (ref 0–0.5)
EOSINOPHIL NFR BLD: 2.8 % (ref 0–8)
ERYTHROCYTE [DISTWIDTH] IN BLOOD BY AUTOMATED COUNT: 18.2 % (ref 11.5–14.5)
EST. GFR  (AFRICAN AMERICAN): >60 ML/MIN/1.73 M^2
EST. GFR  (NON AFRICAN AMERICAN): >60 ML/MIN/1.73 M^2
GLUCOSE SERPL-MCNC: 91 MG/DL (ref 70–110)
HCT VFR BLD AUTO: 29.4 % (ref 37–48.5)
HGB BLD-MCNC: 8.6 G/DL (ref 12–16)
LYMPHOCYTES # BLD AUTO: 2.6 K/UL (ref 1–4.8)
LYMPHOCYTES NFR BLD: 34.3 % (ref 18–48)
MAGNESIUM SERPL-MCNC: 1.6 MG/DL (ref 1.6–2.6)
MCH RBC QN AUTO: 23.4 PG (ref 27–31)
MCHC RBC AUTO-ENTMCNC: 29.3 G/DL (ref 32–36)
MCV RBC AUTO: 80 FL (ref 82–98)
MONOCYTES # BLD AUTO: 0.5 K/UL (ref 0.3–1)
MONOCYTES NFR BLD: 5.9 % (ref 4–15)
NEUTROPHILS # BLD AUTO: 4.3 K/UL (ref 1.8–7.7)
NEUTROPHILS NFR BLD: 56.6 % (ref 38–73)
PHOSPHATE SERPL-MCNC: 3.2 MG/DL (ref 2.7–4.5)
PLATELET # BLD AUTO: 509 K/UL (ref 150–350)
PMV BLD AUTO: 8.4 FL (ref 9.2–12.9)
POTASSIUM SERPL-SCNC: 4.3 MMOL/L (ref 3.5–5.1)
PROT SERPL-MCNC: 6.1 G/DL (ref 6–8.4)
PTH-INTACT SERPL-MCNC: 119.1 PG/ML (ref 9–77)
RBC # BLD AUTO: 3.68 M/UL (ref 4–5.4)
SODIUM SERPL-SCNC: 138 MMOL/L (ref 136–145)
WBC # BLD AUTO: 7.63 K/UL (ref 3.9–12.7)

## 2019-08-14 PROCEDURE — S0028 INJECTION, FAMOTIDINE, 20 MG: HCPCS | Performed by: STUDENT IN AN ORGANIZED HEALTH CARE EDUCATION/TRAINING PROGRAM

## 2019-08-14 PROCEDURE — B4185 PARENTERAL SOL 10 GM LIPIDS: HCPCS | Performed by: SURGERY

## 2019-08-14 PROCEDURE — 63600175 PHARM REV CODE 636 W HCPCS: Performed by: STUDENT IN AN ORGANIZED HEALTH CARE EDUCATION/TRAINING PROGRAM

## 2019-08-14 PROCEDURE — 83970 ASSAY OF PARATHORMONE: CPT

## 2019-08-14 PROCEDURE — 11000001 HC ACUTE MED/SURG PRIVATE ROOM

## 2019-08-14 PROCEDURE — 85025 COMPLETE CBC W/AUTO DIFF WBC: CPT

## 2019-08-14 PROCEDURE — 84100 ASSAY OF PHOSPHORUS: CPT

## 2019-08-14 PROCEDURE — 80053 COMPREHEN METABOLIC PANEL: CPT

## 2019-08-14 PROCEDURE — A4217 STERILE WATER/SALINE, 500 ML: HCPCS | Performed by: SURGERY

## 2019-08-14 PROCEDURE — 25000003 PHARM REV CODE 250: Performed by: STUDENT IN AN ORGANIZED HEALTH CARE EDUCATION/TRAINING PROGRAM

## 2019-08-14 PROCEDURE — 83735 ASSAY OF MAGNESIUM: CPT

## 2019-08-14 PROCEDURE — 94761 N-INVAS EAR/PLS OXIMETRY MLT: CPT

## 2019-08-14 PROCEDURE — 25000003 PHARM REV CODE 250: Performed by: SURGERY

## 2019-08-14 PROCEDURE — 63600175 PHARM REV CODE 636 W HCPCS: Performed by: SURGERY

## 2019-08-14 RX ORDER — ENOXAPARIN SODIUM 100 MG/ML
1 INJECTION SUBCUTANEOUS EVERY 12 HOURS
Status: DISCONTINUED | OUTPATIENT
Start: 2019-08-14 | End: 2019-08-20

## 2019-08-14 RX ADMIN — OXYCODONE HYDROCHLORIDE 10 MG: 5 TABLET ORAL at 10:08

## 2019-08-14 RX ADMIN — PROMETHAZINE HYDROCHLORIDE 6.25 MG: 25 INJECTION INTRAMUSCULAR; INTRAVENOUS at 06:08

## 2019-08-14 RX ADMIN — LOPERAMIDE HYDROCHLORIDE 2 MG: 2 CAPSULE ORAL at 10:08

## 2019-08-14 RX ADMIN — MICONAZOLE NITRATE: 20 POWDER TOPICAL at 10:08

## 2019-08-14 RX ADMIN — MORPHINE 6 MG: 10 SOLUTION ORAL at 05:08

## 2019-08-14 RX ADMIN — HYDROMORPHONE HYDROCHLORIDE 1 MG: 2 INJECTION INTRAMUSCULAR; INTRAVENOUS; SUBCUTANEOUS at 07:08

## 2019-08-14 RX ADMIN — OXYCODONE HYDROCHLORIDE 10 MG: 5 TABLET ORAL at 04:08

## 2019-08-14 RX ADMIN — CEFEPIME HYDROCHLORIDE 2 G: 2 INJECTION, POWDER, FOR SOLUTION INTRAVENOUS at 10:08

## 2019-08-14 RX ADMIN — LOPERAMIDE HYDROCHLORIDE 2 MG: 2 CAPSULE ORAL at 02:08

## 2019-08-14 RX ADMIN — SUCRALFATE 1 G: 1 SUSPENSION ORAL at 10:08

## 2019-08-14 RX ADMIN — MORPHINE 6 MG: 10 SOLUTION ORAL at 12:08

## 2019-08-14 RX ADMIN — OXANDROLONE 10 MG: 2.5 TABLET ORAL at 10:08

## 2019-08-14 RX ADMIN — FAMOTIDINE 20 MG: 10 INJECTION, SOLUTION INTRAVENOUS at 08:08

## 2019-08-14 RX ADMIN — OXYCODONE HYDROCHLORIDE 10 MG: 5 TABLET ORAL at 05:08

## 2019-08-14 RX ADMIN — LOPERAMIDE HYDROCHLORIDE 2 MG: 2 CAPSULE ORAL at 08:08

## 2019-08-14 RX ADMIN — CEFEPIME HYDROCHLORIDE 2 G: 2 INJECTION, POWDER, FOR SOLUTION INTRAVENOUS at 04:08

## 2019-08-14 RX ADMIN — PROMETHAZINE HYDROCHLORIDE 6.25 MG: 25 INJECTION INTRAMUSCULAR; INTRAVENOUS at 04:08

## 2019-08-14 RX ADMIN — SUCRALFATE 1 G: 1 SUSPENSION ORAL at 08:08

## 2019-08-14 RX ADMIN — CEFEPIME HYDROCHLORIDE 2 G: 2 INJECTION, POWDER, FOR SOLUTION INTRAVENOUS at 12:08

## 2019-08-14 RX ADMIN — SODIUM CHLORIDE: 234 INJECTION INTRAMUSCULAR; INTRAVENOUS; SUBCUTANEOUS at 05:08

## 2019-08-14 RX ADMIN — ONDANSETRON 8 MG: 8 TABLET, ORALLY DISINTEGRATING ORAL at 07:08

## 2019-08-14 RX ADMIN — ENOXAPARIN SODIUM 80 MG: 100 INJECTION SUBCUTANEOUS at 11:08

## 2019-08-14 RX ADMIN — SUCRALFATE 1 G: 1 SUSPENSION ORAL at 12:08

## 2019-08-14 RX ADMIN — MORPHINE 6 MG: 10 SOLUTION ORAL at 08:08

## 2019-08-14 RX ADMIN — SUCRALFATE 1 G: 1 SUSPENSION ORAL at 05:08

## 2019-08-14 RX ADMIN — I.V. FAT EMULSION 250 ML: 20 EMULSION INTRAVENOUS at 10:08

## 2019-08-14 RX ADMIN — ENOXAPARIN SODIUM 50 MG: 100 INJECTION SUBCUTANEOUS at 08:08

## 2019-08-14 RX ADMIN — ONDANSETRON 8 MG: 8 TABLET, ORALLY DISINTEGRATING ORAL at 12:08

## 2019-08-14 RX ADMIN — CYANOCOBALAMIN 1000 MCG: 1000 INJECTION, SOLUTION INTRAMUSCULAR at 08:08

## 2019-08-14 RX ADMIN — TRAMADOL HYDROCHLORIDE 50 MG: 50 TABLET, FILM COATED ORAL at 03:08

## 2019-08-14 RX ADMIN — MICONAZOLE NITRATE: 20 POWDER TOPICAL at 08:08

## 2019-08-14 RX ADMIN — FAMOTIDINE 20 MG: 10 INJECTION, SOLUTION INTRAVENOUS at 10:08

## 2019-08-14 RX ADMIN — OXANDROLONE 10 MG: 2.5 TABLET ORAL at 08:08

## 2019-08-14 RX ADMIN — OCTREOTIDE ACETATE 250 MCG/HR: 1000 INJECTION, SOLUTION INTRAVENOUS; SUBCUTANEOUS at 10:08

## 2019-08-14 NOTE — PLAN OF CARE
Problem: Adult Inpatient Plan of Care  Goal: Plan of Care Review  Outcome: Ongoing (interventions implemented as appropriate)  Clindimix-E and Lipids infusing for malnutrition overnight. IV antibiotics given as ordered. Medicated for pain and nausea/ vomiting with good results. Remains free from falls, bed alarm in use. Jejunostomy emptied frequently overnight, liquid stool and gas passed.

## 2019-08-14 NOTE — PLAN OF CARE
Problem: Adult Inpatient Plan of Care  Goal: Plan of Care Review  Outcome: Ongoing (interventions implemented as appropriate)  Cued into patient's room.  Permission received per patient to turn camera to view patient.  Introduced as VN for night shift that will be working with floor nurse and nursing assistant.  Spouse at bedside.  Educated patient on VN's role in patient care. Plan of care reviewed with patient. Education per flowsheet.  Opportunity given for questions and questions answered.  States increased output of jejunostomy; educated on restart of opium tincture.  Instructed to call for assistance.  Will cont to monitor.    Labs, notes, and orders reviewed.

## 2019-08-14 NOTE — PHYSICIAN QUERY
PT Name: Parvin Corbin  MR #: [id number]    Physician Query Form - Nutrition Clarification     CDS/: Lauren Moreno RN, CDS               Contact information: [email or web address]                                                                                                                        [phone]    This form is a permanent document in the medical record.     Query Date: August 14, 2019    By submitting this query, we are merely seeking further clarification of documentation.. Please utilize your independent clinical judgment when addressing the question(s) below.    The Medical record contains the following:   Indicators  Supporting Clinical Findings Location in Medical Record   x % of Estimated Energy Intake over a time frame from p.o., TF, or TPN <50% of estimated energy requirement for 1 month   Nutrition Consult 8/12   x Weight Status over a time frame Weight Loss: 6% x 1 month    Nutrition Consult 8/12   x Subcutaneous Fat and/or Muscle Loss Muscle Mass Depletion: mild depletion of temples, clavicle region and scapular region  Nutrition Consult 8/12    Fluid Accumulation or Edema      Reduced  Strength     x Wt / BMI / Usual Body Weight BMI (Calculated): 28.1  Nutrition Consult 8/12    Delayed Wound Healing / Failure to Thrive     x Acute or Chronic Illness 43 y.o. 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  Neuroendocrine PN 8/13          Neuroendocrine PN 8/13    Medication     x Treatment Continue TPN    Dietary working on getting Enterade available in formulary   Neuroendocrine PN 8/13  Neuroendocrine PN 8/13   x Other Protein calorie malnutrition [E46] Neuroendocrine PN 8/9     AND / ASPEN Clinical Characteristics  (October 2011)  A minimum of two characteristics is recommended for diagnosing either moderate or severe malnutrition   Mild Malnutrition Moderate Malnutrition Severe Malnutrition   Energy Intake from p.o., TF or TPN. < 75% intake of estimated energy needs for less than 7 days < 75% intake of estimated energy needs for greater than 7 days < 50% intake of estimated energy needs for > 5 days   Weight Loss 1-2% in 1 month  5% in 3 months  7.5% in 6 months  10% in 1 year 1-2 % in 1 week  5% in 1 month  7.5% in 3 months  10% in 6 months  20% in 1 year > 2% in 1 week  > 5% in 1 month  > 7.5% in 3 months  > 10% in 6 months  > 20% in 1 year   Physical Findings     None *Mild subcutaneous fat and/or muscle loss  *Mild fluid accumulation  *Stage II decubitus  *Surgical wound or non-healing wound *Mod/severe subcutaneous fat and/or muscle loss  *Mod/severe fluid accumulation  *Stage III or IV decubitus  *Non-healing surgical wound     Provider, please specify the degree of malnutrition     [  ] Moderate Protein-Calorie Malnutrition   [ v ] Severe Protein-Calorie Malnutrition   [  ] Other Nutritional Diagnosis (please specify):    [  ] Other:    [  ] Clinically Undetermined       Please document in your progress notes daily for the duration of treatment until resolved and include in your discharge summary.

## 2019-08-14 NOTE — PHYSICIAN QUERY
PT Name: Parvin Corbin  MR #: [id number]     Physician Query Form - Documentation Clarification      CDS/: Lauren Moreno RN, CDS               Contact information: [email or web address]                                                                                                                        [phone]    This form is a permanent document in the medical record.     Query Date: August 14, 2019    By submitting this query, we are merely seeking further clarification of documentation. Please utilize your independent clinical judgment when addressing the question(s) below.    The Medical record reflects the following:    Supporting Clinical Findings Location in Medical Record    transferred from OSH with abdominal pain and sepsis (GNR).... RUE PICC line removed yesterday due to suspected sepsis    H&P 8/8   gram negative amy bacteremia...She is currently afebrile with no leukocytosis...She states that she has been taking her axillary temperature at home since June and that she has been intermittently febrile over the past two months with temperatures reaching 103.   ID Consult 8/9   transferred from OSH with abdominal pain and sepsis (GNR)... presenting for GNR bacteremia.   Neuroendocrine PN 8/10   E.coli bacteremia...Patient was initially on Zosyn on admission and we switched her to Cefepime   ID PN 8/10   E coli sepsis    Neuroendocrine PN 8/12   WBC: 7.4 --> 6.8 --> 5.9 --> 7.2 --> 7.4 --> 7.8  Procalcitonin: 0.25   Labs 8/8- 8/13  Labs 8/10   Vital Signs (24h Range):  Temp:  [98 °F -98.4 °F] 98 °F   Pulse:  [] 100  Resp:  [18] 18  BP: ()/(62-64) 97/62    Vital Signs (24h Range):  Temp:  [97.8 °F -98.5 °F] 98.5 °F  Pulse:  [] 82  Resp:  [18-19] 18  BP: ()/(55-65) 97/64    Vital Signs Range (Last 24H):  Temp:  [97.7 °F -98.3 °F]   Pulse:  [78-99]   Resp:  [18-20]   BP: ()/(54-70)    H&P 8/8            Neuroendocrine PN 8/9            Neuroendocrine PN 8/10                                                                             Doctor, please clarify the conflicting diagnoses above     Provider Use Only    [ x  ] E. Coli bacteremia only  [   ] E. Coli sepsis  [   ] Other clarification of findings, please specify _____________________                                                                                                           [  ] Clinically Undetermined

## 2019-08-14 NOTE — PLAN OF CARE
PM round made.  Patient is resting in bed with no complaints of pain at this time.  Family member at bedside. No requests for anything now.  Will continue to monitor.

## 2019-08-14 NOTE — PROGRESS NOTES
Pharmacy New Medication Education    Patient and/or Caregiver ACCEPTED medication education.    Pharmacy has provided education on the name, indication, and possible side effects of the medication(s) prescribed, using teach-back method.     Learners of pharmacy medication education includes:  Patient and spouse    Medication Indication Side Effects   enoxaparin DVT treatment (in place of apixaban) bruising and bleeding        The following medications have also been discussed, during this admission.     Current Facility-Administered Medications   Medication Frequency    0.9%  NaCl infusion (for blood administration) Q24H PRN    acetaminophen tablet 650 mg Q8H PRN    acetaminophen tablet 650 mg Q4H PRN    ceFEPIme in dextrose 5% 2 gram/50 mL IVPB 2 g Q8H    cyanocobalamin injection 1,000 mcg Daily    enoxaparin injection 80 mg Q12H    famotidine (PF) injection 20 mg BID    fat emulsion 20% infusion 250 mL Daily    hydromorphone (PF) injection 1 mg Q4H PRN    loperamide capsule 2 mg TID    miconazole NITRATE 2 % top powder BID    octreotide (SANDOSTATIN) 5,000 mcg in sodium chloride 0.9% 100 mL infusion Continuous    ondansetron disintegrating tablet 8 mg Q8H PRN    opium tincture 6 mg TID WM    oxandrolone tablet 10 mg BID    oxyCODONE immediate release tablet 10 mg Q4H PRN    promethazine (PHENERGAN) 6.25 mg in dextrose 5 % 50 mL IVPB Q6H PRN    sodium chloride 0.9% flush 10 mL PRN    sucralfate 100 mg/mL suspension 1 g QID (WM & HS)    TPN ADULT CENTRAL LINE CUSTOM Continuous    TPN ADULT CENTRAL LINE CUSTOM Continuous    traMADol tablet 50 mg Q6H PRN    zolpidem tablet 5 mg Nightly PRN          Thank you  Linda Giles, PharmD

## 2019-08-14 NOTE — PROGRESS NOTES
Neuroendocrine Surgery  Progress Note    Hospital course: 43 y.o. woman transferred from OSH with abdominal pain and sepsis (GNR). 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  Hospital stay complicated by bilateral pulmonary embolisms. Patient reports having no issues with gastric bypass until episode of abdominal pain and resulting surgery last month. She now reports having a few days of abdominal pain with associated nausea/vomiting, fever/chills. Patient has been on TPN at home since discharge from hospital last month via PICC line. RUE PICC line removed yesterday due to suspected sepsis and new PICC placed to [facility]. TPN initiated on HD 2. Blood cultures grew multiresistant E coli, antibiotic therapy changed from zosyn to cefepime.     S:  NAEO. AFVSS  Output from jejunostomy decreased since starting loperamide and tincture of opium  Denies n/v  No f/c, cp/sob  +gas/stool to ostomy  Patient ambulating/voiding without issue    O:  Temp:  [97.6 °F (36.4 °C)-98.8 °F (37.1 °C)] 98 °F (36.7 °C)  Pulse:  [65-75] 66  Resp:  [16-18] 18  SpO2:  [96 %-99 %] 96 %  BP: ()/(59-72) 106/61    Physical Exam:  Gen: no acute distress. Alert and oriented x3.  HEENT: normocephalic and atraumatic. EOMI.   Resp: unlabored respirations  CV: regular rate  Abd: soft, jejunostomy to LUQ with liquid stool and gas to bag. Denies abdominal pain, non-distended  Ext: warm and well perfused  MSK: normal range of motion, normal strength  Neuro: no focal deficits, normal sensation    I/O:  Intake/Output - Last 3 Shifts       08/12 0700 - 08/13 0659 08/13 0700 - 08/14 0659 08/14 0700 - 08/15 0659    P.O. 1925 60     I.V. (mL/kg) 177.4 (2.1) 90 (1.1)     Blood       IV Piggyback 150 200     TPN 1585 557.5     Total Intake(mL/kg) 3837.4 (46.5) 907.5 (11.3)     Urine  (mL/kg/hr) 2250 (1.1) 3350 (1.7)     Emesis/NG output 500 300     Stool 1150 2625     Total Output 3900 6275     Net -62.6 -5367.5            Urine Occurrence 2 x      Stool Occurrence 0 x 1 x     Emesis Occurrence 2 x 1 x         CBC:   Recent Labs     08/12/19  0530 08/13/19  0430 08/14/19  0500   WBC 7.47 7.82 7.63   HGB 7.7* 8.4* 8.6*   HCT 26.5* 28.9* 29.4*   * 534* 509*     CMP:  Recent Labs     08/12/19  0530 08/13/19  0430 08/14/19  0500    140 138   K 4.3 4.4 4.3    105 103   CO2 28 29 29   * 96 91   BUN 8 9 12   CREATININE 0.6 0.6 0.7   CALCIUM 8.3* 8.7 8.7   BILITOT 0.4 0.2 0.2   ALKPHOS 149* 226* 188*   AST 22 55* 27   ALT 19 37 27   ANIONGAP 6* 6* 6*     Recent Labs     08/12/19  0530 08/13/19  0430 08/14/19  0500   MG 2.1 1.7 1.6   PHOS 3.9 3.8 3.2     Micro:   Microbiology Results (last 7 days)     Procedure Component Value Units Date/Time    Blood culture [[id number]] Collected:  08/10/19 0936    Order Status:  Completed Specimen:  Blood from Line, PICC Left Brachial Updated:  08/13/19 1812     Blood Culture, Routine No Growth to date      No Growth to date      No Growth to date      No Growth to date    Blood culture [[id number]] Collected:  08/10/19 0936    Order Status:  Completed Specimen:  Blood from Line, PICC Left Brachial Updated:  08/13/19 1812     Blood Culture, Routine No Growth to date      No Growth to date      No Growth to date      No Growth to date        A/P: 43 y.o. 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 Patient with approx 15 cm jejunum distal to LOT therefore ability for nutritional absorption severely decreased. Dietary working on getting Enterade available in formulary. Added lipids to unit    Continue bowel regimen  with opium tincture and loperamide   Strict documentation of I/Os   Pain/nausea control PRN   Electrolyte replacement PRN   Continue cefepime    Surgical intervention to re-establish continuity of GI tract once optimized and negative blood cultures obtained    Myles De Los Santos MD   [employer] General Surgery, PGY-2  08/14/2019

## 2019-08-15 LAB
ALBUMIN SERPL BCP-MCNC: 2.4 G/DL (ref 3.5–5.2)
ALP SERPL-CCNC: 176 U/L (ref 55–135)
ALT SERPL W/O P-5'-P-CCNC: 25 U/L (ref 10–44)
ANION GAP SERPL CALC-SCNC: 5 MMOL/L (ref 8–16)
AST SERPL-CCNC: 27 U/L (ref 10–40)
BACTERIA BLD CULT: NORMAL
BACTERIA BLD CULT: NORMAL
BASOPHILS # BLD AUTO: 0.03 K/UL (ref 0–0.2)
BASOPHILS NFR BLD: 0.4 % (ref 0–1.9)
BILIRUB SERPL-MCNC: 0.1 MG/DL (ref 0.1–1)
BUN SERPL-MCNC: 14 MG/DL (ref 6–20)
CALCIUM SERPL-MCNC: 8.7 MG/DL (ref 8.7–10.5)
CHLORIDE SERPL-SCNC: 103 MMOL/L (ref 95–110)
CO2 SERPL-SCNC: 29 MMOL/L (ref 23–29)
CREAT SERPL-MCNC: 0.6 MG/DL (ref 0.5–1.4)
DIFFERENTIAL METHOD: ABNORMAL
EOSINOPHIL # BLD AUTO: 0.3 K/UL (ref 0–0.5)
EOSINOPHIL NFR BLD: 4.4 % (ref 0–8)
ERYTHROCYTE [DISTWIDTH] IN BLOOD BY AUTOMATED COUNT: 18.6 % (ref 11.5–14.5)
EST. GFR  (AFRICAN AMERICAN): >60 ML/MIN/1.73 M^2
EST. GFR  (NON AFRICAN AMERICAN): >60 ML/MIN/1.73 M^2
GLUCOSE SERPL-MCNC: 105 MG/DL (ref 70–110)
HCT VFR BLD AUTO: 31.1 % (ref 37–48.5)
HGB BLD-MCNC: 9.1 G/DL (ref 12–16)
LYMPHOCYTES # BLD AUTO: 2.5 K/UL (ref 1–4.8)
LYMPHOCYTES NFR BLD: 34.8 % (ref 18–48)
MAGNESIUM SERPL-MCNC: 1.7 MG/DL (ref 1.6–2.6)
MCH RBC QN AUTO: 23.4 PG (ref 27–31)
MCHC RBC AUTO-ENTMCNC: 29.3 G/DL (ref 32–36)
MCV RBC AUTO: 80 FL (ref 82–98)
MONOCYTES # BLD AUTO: 0.6 K/UL (ref 0.3–1)
MONOCYTES NFR BLD: 7.7 % (ref 4–15)
NEUTROPHILS # BLD AUTO: 3.8 K/UL (ref 1.8–7.7)
NEUTROPHILS NFR BLD: 52.7 % (ref 38–73)
PHOSPHATE SERPL-MCNC: 2.8 MG/DL (ref 2.7–4.5)
PLATELET # BLD AUTO: 467 K/UL (ref 150–350)
PMV BLD AUTO: 8.4 FL (ref 9.2–12.9)
POTASSIUM SERPL-SCNC: 4.2 MMOL/L (ref 3.5–5.1)
PROT SERPL-MCNC: 6.2 G/DL (ref 6–8.4)
RBC # BLD AUTO: 3.89 M/UL (ref 4–5.4)
SODIUM SERPL-SCNC: 137 MMOL/L (ref 136–145)
TRIGL SERPL-MCNC: 171 MG/DL (ref 30–150)
WBC # BLD AUTO: 7.3 K/UL (ref 3.9–12.7)

## 2019-08-15 PROCEDURE — 63600175 PHARM REV CODE 636 W HCPCS: Performed by: STUDENT IN AN ORGANIZED HEALTH CARE EDUCATION/TRAINING PROGRAM

## 2019-08-15 PROCEDURE — 63600175 PHARM REV CODE 636 W HCPCS: Performed by: SURGERY

## 2019-08-15 PROCEDURE — 84478 ASSAY OF TRIGLYCERIDES: CPT

## 2019-08-15 PROCEDURE — B4185 PARENTERAL SOL 10 GM LIPIDS: HCPCS | Performed by: SURGERY

## 2019-08-15 PROCEDURE — 25000003 PHARM REV CODE 250: Performed by: SURGERY

## 2019-08-15 PROCEDURE — A4217 STERILE WATER/SALINE, 500 ML: HCPCS | Performed by: SURGERY

## 2019-08-15 PROCEDURE — 25000003 PHARM REV CODE 250: Performed by: STUDENT IN AN ORGANIZED HEALTH CARE EDUCATION/TRAINING PROGRAM

## 2019-08-15 PROCEDURE — A4216 STERILE WATER/SALINE, 10 ML: HCPCS | Performed by: STUDENT IN AN ORGANIZED HEALTH CARE EDUCATION/TRAINING PROGRAM

## 2019-08-15 PROCEDURE — 80053 COMPREHEN METABOLIC PANEL: CPT

## 2019-08-15 PROCEDURE — 83735 ASSAY OF MAGNESIUM: CPT

## 2019-08-15 PROCEDURE — 97803 MED NUTRITION INDIV SUBSEQ: CPT

## 2019-08-15 PROCEDURE — 94761 N-INVAS EAR/PLS OXIMETRY MLT: CPT

## 2019-08-15 PROCEDURE — 11000001 HC ACUTE MED/SURG PRIVATE ROOM

## 2019-08-15 PROCEDURE — 85025 COMPLETE CBC W/AUTO DIFF WBC: CPT

## 2019-08-15 PROCEDURE — S0028 INJECTION, FAMOTIDINE, 20 MG: HCPCS | Performed by: STUDENT IN AN ORGANIZED HEALTH CARE EDUCATION/TRAINING PROGRAM

## 2019-08-15 PROCEDURE — 84100 ASSAY OF PHOSPHORUS: CPT

## 2019-08-15 RX ORDER — MAGNESIUM SULFATE HEPTAHYDRATE 40 MG/ML
2 INJECTION, SOLUTION INTRAVENOUS ONCE
Status: COMPLETED | OUTPATIENT
Start: 2019-08-15 | End: 2019-08-15

## 2019-08-15 RX ORDER — FAMOTIDINE 20 MG/1
20 TABLET, FILM COATED ORAL 2 TIMES DAILY
Status: DISCONTINUED | OUTPATIENT
Start: 2019-08-15 | End: 2019-08-21

## 2019-08-15 RX ADMIN — CEFEPIME 2 G: 2 INJECTION, POWDER, FOR SOLUTION INTRAVENOUS at 09:08

## 2019-08-15 RX ADMIN — FAMOTIDINE 20 MG: 10 INJECTION, SOLUTION INTRAVENOUS at 08:08

## 2019-08-15 RX ADMIN — MAGNESIUM SULFATE IN WATER 2 G: 40 INJECTION, SOLUTION INTRAVENOUS at 08:08

## 2019-08-15 RX ADMIN — OCTREOTIDE ACETATE 250 MCG/HR: 1000 INJECTION, SOLUTION INTRAVENOUS; SUBCUTANEOUS at 05:08

## 2019-08-15 RX ADMIN — CEFEPIME HYDROCHLORIDE 2 G: 2 INJECTION, POWDER, FOR SOLUTION INTRAVENOUS at 06:08

## 2019-08-15 RX ADMIN — SUCRALFATE 1 G: 1 SUSPENSION ORAL at 11:08

## 2019-08-15 RX ADMIN — SUCRALFATE 1 G: 1 SUSPENSION ORAL at 08:08

## 2019-08-15 RX ADMIN — Medication 10 ML: at 05:08

## 2019-08-15 RX ADMIN — OXANDROLONE 10 MG: 2.5 TABLET ORAL at 08:08

## 2019-08-15 RX ADMIN — FAMOTIDINE 20 MG: 20 TABLET, FILM COATED ORAL at 09:08

## 2019-08-15 RX ADMIN — I.V. FAT EMULSION 250 ML: 20 EMULSION INTRAVENOUS at 09:08

## 2019-08-15 RX ADMIN — CEFEPIME HYDROCHLORIDE 2 G: 2 INJECTION, POWDER, FOR SOLUTION INTRAVENOUS at 01:08

## 2019-08-15 RX ADMIN — PROMETHAZINE HYDROCHLORIDE 6.25 MG: 25 INJECTION INTRAMUSCULAR; INTRAVENOUS at 09:08

## 2019-08-15 RX ADMIN — OXANDROLONE 10 MG: 2.5 TABLET ORAL at 09:08

## 2019-08-15 RX ADMIN — SUCRALFATE 1 G: 1 SUSPENSION ORAL at 09:08

## 2019-08-15 RX ADMIN — LOPERAMIDE HYDROCHLORIDE 2 MG: 2 CAPSULE ORAL at 09:08

## 2019-08-15 RX ADMIN — LOPERAMIDE HYDROCHLORIDE 2 MG: 2 CAPSULE ORAL at 08:08

## 2019-08-15 RX ADMIN — CYANOCOBALAMIN 1000 MCG: 1000 INJECTION, SOLUTION INTRAMUSCULAR at 08:08

## 2019-08-15 RX ADMIN — SODIUM PHOSPHATE, MONOBASIC, MONOHYDRATE 30 MMOL: 276; 142 INJECTION, SOLUTION INTRAVENOUS at 11:08

## 2019-08-15 RX ADMIN — ENOXAPARIN SODIUM 80 MG: 100 INJECTION SUBCUTANEOUS at 08:08

## 2019-08-15 RX ADMIN — LOPERAMIDE HYDROCHLORIDE 2 MG: 2 CAPSULE ORAL at 03:08

## 2019-08-15 RX ADMIN — SUCRALFATE 1 G: 1 SUSPENSION ORAL at 05:08

## 2019-08-15 RX ADMIN — OXYCODONE HYDROCHLORIDE 10 MG: 5 TABLET ORAL at 05:08

## 2019-08-15 RX ADMIN — MICONAZOLE NITRATE: 20 POWDER TOPICAL at 09:08

## 2019-08-15 RX ADMIN — PROMETHAZINE HYDROCHLORIDE 6.25 MG: 25 INJECTION INTRAMUSCULAR; INTRAVENOUS at 03:08

## 2019-08-15 RX ADMIN — ONDANSETRON 8 MG: 8 TABLET, ORALLY DISINTEGRATING ORAL at 05:08

## 2019-08-15 RX ADMIN — OXYCODONE HYDROCHLORIDE 10 MG: 5 TABLET ORAL at 03:08

## 2019-08-15 RX ADMIN — OXYCODONE HYDROCHLORIDE 10 MG: 5 TABLET ORAL at 07:08

## 2019-08-15 RX ADMIN — HYDROMORPHONE HYDROCHLORIDE 1 MG: 2 INJECTION INTRAMUSCULAR; INTRAVENOUS; SUBCUTANEOUS at 10:08

## 2019-08-15 RX ADMIN — ONDANSETRON 8 MG: 8 TABLET, ORALLY DISINTEGRATING ORAL at 08:08

## 2019-08-15 RX ADMIN — MICONAZOLE NITRATE: 20 POWDER TOPICAL at 08:08

## 2019-08-15 RX ADMIN — HYDROMORPHONE HYDROCHLORIDE 1 MG: 2 INJECTION INTRAMUSCULAR; INTRAVENOUS; SUBCUTANEOUS at 05:08

## 2019-08-15 RX ADMIN — HYDROMORPHONE HYDROCHLORIDE 1 MG: 2 INJECTION INTRAMUSCULAR; INTRAVENOUS; SUBCUTANEOUS at 12:08

## 2019-08-15 RX ADMIN — ENOXAPARIN SODIUM 80 MG: 100 INJECTION SUBCUTANEOUS at 09:08

## 2019-08-15 RX ADMIN — SODIUM CHLORIDE: 234 INJECTION INTRAMUSCULAR; INTRAVENOUS; SUBCUTANEOUS at 05:08

## 2019-08-15 NOTE — PLAN OF CARE
Problem: Parenteral Nutrition  Goal: Effective Intravenous Nutrition Therapy Delivery  Outcome: Ongoing (interventions implemented as appropriate)  Recommendation:   1. Continue to encourrage intake at meals as tolerated.   2. Continue TPN in preparation for surgery.    Goals:   1. Pt will tolerate TPN.   2. Pt will tolerate po diet with at least 50% intake at meals.  Nutrition Goal Status: progressing towards goal  Communication of RD Recs: reviewed with RN(Skylar)

## 2019-08-15 NOTE — PROGRESS NOTES
Pharmacist Intervention IV to PO Note    Parvin Corbin is a 43 y.o. female being treated with IV medication famotidine    Patient Data:    Vital Signs (Most Recent):  Temp: 98.3 °F (36.8 °C) (08/15/19 0746)  Pulse: 72 (08/15/19 0746)  Resp: 18 (08/15/19 0746)  BP: 112/62 (08/15/19 0746)  SpO2: 98 % (08/15/19 0357) Vital Signs (72h Range):  Temp:  [97.4 °F (36.3 °C)-98.8 °F (37.1 °C)]   Pulse:  [65-83]   Resp:  [16-18]   BP: ()/(52-78)   SpO2:  [96 %-99 %]      CBC:  Recent Labs   Lab 08/13/19  0430 08/14/19  0500 08/15/19  0615   WBC 7.82 7.63 7.30   RBC 3.61* 3.68* 3.89*   HGB 8.4* 8.6* 9.1*   HCT 28.9* 29.4* 31.1*   * 509* 467*   MCV 80* 80* 80*   MCH 23.3* 23.4* 23.4*   MCHC 29.1* 29.3* 29.3*     CMP:     Recent Labs   Lab 08/13/19  0430 08/14/19  0500 08/15/19  0615   GLU 96 91 105   CALCIUM 8.7 8.7 8.7   ALBUMIN 2.2* 2.3* 2.4*   PROT 6.0 6.1 6.2    138 137   K 4.4 4.3 4.2   CO2 29 29 29    103 103   BUN 9 12 14   CREATININE 0.6 0.7 0.6   ALKPHOS 226* 188* 176*   ALT 37 27 25   AST 55* 27 27   BILITOT 0.2 0.2 0.1       Dietary Orders:  Diet Orders            TPN ADULT CENTRAL LINE CUSTOM at 50 mL/hr starting at 08/15 1600    TPN ADULT CENTRAL LINE CUSTOM at 50 mL/hr starting at 08/14 1600    Dietary nutrition supplements [facility]; Optisource Very High Protein Drink - Any flavor starting at 08/12 1800    Diet Adult Regular (IDDSI Level 7): Regular starting at 08/10 0859            Based on the following criteria, this patient qualifies for intravenous to oral conversion:  [x] The patients gastrointestinal tract is functioning (tolerating medications via oral or enteral route for 24 hours and tolerating food or enteral feeds for 24 hours).  [x] The patient is hemodynamically stable for 24 hours (heart rate <100 beats per minute, systolic blood pressure >99 mm Hg, and respiratory rate <20 breaths per minute).  [x] The patient shows clinical improvement (afebrile for at least 24 hours  and white blood cell count downtrending or normalized). Additionally, the patient must be non-neutropenic (absolute neutrophil count >500 cells/mm3).  [x] For antimicrobials, the patient has received IV therapy for at least 24 hours.    IV medication famotidine 20mg BID will be changed to oral medication famotidine 20mg BID    Pharmacist's Name: Linda Giles  Pharmacist's Extension: [phone]

## 2019-08-15 NOTE — PROGRESS NOTES
"[facility]  Adult Nutrition  Progress Note    SUMMARY       Recommendations    Recommendation:   1. Continue to encourrage intake at meals as tolerated.   2. Continue TPN in preparation for surgery.    Goals:   1. Pt will tolerate TPN.   2. Pt will tolerate po diet with at least 50% intake at meals.  Nutrition Goal Status: progressing towards goal  Communication of RD Recs: reviewed with RN(Skylar)    Reason for Assessment  Reason For Assessment: RD follow-up  Diagnosis: (sepsis)  Relevant Medical History: DM, anxiety, ADD, gastric bypass, expl lap, cholecystectomy  General Information Comments: Pt on Regular diet with Optisource. Tolerating po intake. Also receiving custom TPN at 50ml/hr with daily IVFE. NFPE completed 8/10-mild wastingof temples/clavicles.  Nutrition Discharge Planning: pt to d/c on Regular diet & possible TPN    Nutrition Risk Screen  Nutrition Risk Screen: tube feeding or parenteral nutrition    Nutrition/Diet History  Food Preferences: no Roman Catholic or cultural food prefs identified  Spiritual, Cultural Beliefs, Religion Practices, Values that Affect Care: no  Factors Affecting Nutritional Intake: altered gastrointestinal function    Anthropometrics  Temp: 97.5 °F (36.4 °C)  Height Method: Stated  Height: 5' 5" (165.1 cm)  Height (inches): 65 in  Weight Method: Bed Scale  Weight: 82.1 kg (181 lb)  Weight (lb): 181 lb  Ideal Body Weight (IBW), Female: 125 lb  % Ideal Body Weight, Female (lb): 134.74 lb  BMI (Calculated): 28.1  BMI Grade: 25 - 29.9 - overweight  Usual Body Weight (UBW), k.8 kg  % Usual Body Weight: 93.59  % Weight Change From Usual Weight: -6.6 %     Lab/Procedures/Meds  Pertinent Labs Reviewed: reviewed  Pertinent Labs Comments: Alb 2.3L, PAB 14L  Pertinent Medications Reviewed: reviewed  Pertinent Medications Comments: loperamide    Estimated/Assessed Needs  Weight Used For Calorie Calculations: 82.6 kg (182 lb 1.6 oz)  Energy Calorie Requirements " (kcal): 1919  Energy Need Method: [place](x1.3)  Protein Requirements: 83g (1.0g/kg)  Weight Used For Protein Calculations: 82.6 kg (182 lb 1.6 oz)  Estimated Fluid Requirement Method: RDA Method  RDA Method (mL): 1919     Nutrition Prescription Ordered  Current Diet Order: Regular  Current Nutrition Support Formula Ordered: (custom TPN)  Current Nutrition Support Rate Ordered: 50 (ml)  Current Nutrition Support Frequency Ordered: ml/hr with daily IVFE  Oral Nutrition Supplement: Optisource    Evaluation of Received Nutrient/Fluid Intake  Parenteral Calories (kcal): 1282  Parenteral Protein (gm): 108  Parenteral Fluid (mL): 1200  GIR (Glucose Infusion Rate) (mg/kg/min): 2.1 mg/kg/min  % Kcal Needs: 66  % Protein Needs: 130  I/O: 1040/3305  Energy Calories Required: meeting needs  Protein Required: meeting needs  Fluid Required: meeting needs  Comments: LBM 8/14  % Intake of Estimated Energy Needs: 50 - 75 %  % Meal Intake: 25 - 50 %    Nutrition Risk  Level of Risk/Frequency of Follow-up: (2xweekly)     Assessment and Plan  Protein calorie malnutrition  Malnutrition in the context of Acute Illness/Injury    Related to (etiology):  S/p surgery/sepsis    Signs and Symptoms (as evidenced by):  Energy Intake: <50% of estimated energy requirement for 1 month  Muscle Mass Depletion: mild depletion of temples, clavicle region and scapular region   Weight Loss: 6% x 1 month     Interventions:  Parenteral nutrition  Commercial beverage    Nutrition Diagnosis Status:  Continues       Monitor and Evaluation  Food and Nutrient Intake: food and beverage intake, parenteral nutrition intake  Food and Nutrient Adminstration: diet order, enteral and parenteral nutrition administration  Physical Activity and Function: nutrition-related ADLs and IADLs  Anthropometric Measurements: weight  Biochemical Data, Medical Tests and Procedures: electrolyte and renal panel  Nutrition-Focused Physical Findings: overall appearance      Malnutrition Assessment  Malnutrition Type: acute illness or injury  Weight Loss (Malnutrition): greater than 5% in 1 month   [place] (Muscle Loss): mild depletion  Clavicle Bone Region (Muscle Loss): mild depletion   Subcutaneous Fat Loss (Final Summary): well nourished  Muscle Loss Evaluation (Final Summary): mild protein-calorie malnutrition       Nutrition Follow-Up  RD Follow-up?: Yes

## 2019-08-15 NOTE — PLAN OF CARE
Problem: Adult Inpatient Plan of Care  Goal: Plan of Care Review  Outcome: Ongoing (interventions implemented as appropriate)  Pt vitals were maintained. Pt get up on her own. Pt stated that she had some moderate nausea so I gave the pt her prn nausea meds, also pt ask for her IV pain meds as well , gave it to pt after that pt sleep with no problems. Pt stoma site looks red pt stated that it was burning a little. Pt also stated that she didn't have a appetite that much today

## 2019-08-15 NOTE — PROGRESS NOTES
Neuroendocrine Surgery  Progress Note    Hospital course: 43 y.o. woman transferred from OSH with abdominal pain and sepsis (GNR). 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  Hospital stay complicated by bilateral pulmonary embolisms. Patient reports having no issues with gastric bypass until episode of abdominal pain and resulting surgery last month. She now reports having a few days of abdominal pain with associated nausea/vomiting, fever/chills. Patient has been on TPN at home since discharge from hospital last month via PICC line. RUE PICC line removed yesterday due to suspected sepsis and new PICC placed to [facility]. TPN initiated on HD 2. Blood cultures grew multiresistant E coli, antibiotic therapy changed from zosyn to cefepime.     S:  VSS  Patient had nausea at meals that patient feels is associated with tincture of opium. Had attempted to change tincture of opium to take with meals to decrease nausea.   Output from jejunostomy decreased since starting loperamide and tincture of opium  Denies n/v   No f/c, cp/sob  +gas/stool to ostomy  Patient ambulating/voiding without issue    O:  Temp:  [97.9 °F (36.6 °C)-98.4 °F (36.9 °C)] 98.3 °F (36.8 °C)  Pulse:  [69-83] 72  Resp:  [16-18] 18  SpO2:  [97 %-99 %] 98 %  BP: ()/(52-70) 112/62    Physical Exam:  Gen: no acute distress. Alert and oriented x3.  HEENT: normocephalic and atraumatic. EOMI.   Resp: unlabored respirations  CV: regular rate  Abd: soft, jejunostomy to LUQ with liquid stool and gas to bag. Denies abdominal pain, non-distended  Ext: warm and well perfused  MSK: normal range of motion, normal strength  Neuro: no focal deficits, normal sensation    I/O:  Intake/Output - Last 3 Shifts       08/13 0700 - 08/14 0659 08/14 0700 - 08/15 0659 08/15 0700 - 08/16 0659    P.O. 60  1040     I.V. (mL/kg) 90 (1.1)      IV Piggyback 200      .5      Total Intake(mL/kg) 907.5 (11.3) 1040 (12.7)     Urine (mL/kg/hr) 3350 (1.7) 2930 (1.5)     Emesis/NG output 300 150     Stool 2625 225     Total Output 6275 3305     Net -5367.5 -2265            Urine Occurrence  6 x     Stool Occurrence 1 x 1 x     Emesis Occurrence 1 x          CBC:   Recent Labs     08/13/19  0430 08/14/19  0500 08/15/19  0615   WBC 7.82 7.63 7.30   HGB 8.4* 8.6* 9.1*   HCT 28.9* 29.4* 31.1*   * 509* 467*     CMP:  Recent Labs     08/13/19 0430 08/14/19  0500 08/15/19  0615    138 137   K 4.4 4.3 4.2    103 103   CO2 29 29 29   GLU 96 91 105   BUN 9 12 14   CREATININE 0.6 0.7 0.6   CALCIUM 8.7 8.7 8.7   BILITOT 0.2 0.2 0.1   ALKPHOS 226* 188* 176*   AST 55* 27 27   ALT 37 27 25   ANIONGAP 6* 6* 5*     Recent Labs     08/13/19 0430 08/14/19  0500 08/15/19  0615   MG 1.7 1.6 1.7   PHOS 3.8 3.2 2.8     Micro:   Microbiology Results (last 7 days)     Procedure Component Value Units Date/Time    Blood culture [[id number]] Collected:  08/10/19 0936    Order Status:  Completed Specimen:  Blood from Line, PICC Left Brachial Updated:  08/14/19 1812     Blood Culture, Routine No Growth to date      No Growth to date      No Growth to date      No Growth to date      No Growth to date    Blood culture [[id number]] Collected:  08/10/19 0936    Order Status:  Completed Specimen:  Blood from Line, PICC Left Brachial Updated:  08/14/19 1812     Blood Culture, Routine No Growth to date      No Growth to date      No Growth to date      No Growth to date      No Growth to date        A/P: 43 y.o. 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 Patient with approx 15 cm jejunum distal to LOT  therefore ability for nutritional absorption severely decreased. Dietary working on getting Enterade available in formulary. Added lipids to unit    Continue bowel regimen with loperamide. Discontinue tincture of opium   Strict documentation of I/Os   Pain/nausea control PRN   Electrolyte replacement PRN   Continue cefepime, end date of 8/24 per ID   Surgical intervention to re-establish continuity of GI tract once optimized and negative blood cultures obtained    Halina Spencer MD   08/15/2019

## 2019-08-16 LAB
ALBUMIN SERPL BCP-MCNC: 2.6 G/DL (ref 3.5–5.2)
ALP SERPL-CCNC: 172 U/L (ref 55–135)
ALT SERPL W/O P-5'-P-CCNC: 31 U/L (ref 10–44)
ANION GAP SERPL CALC-SCNC: 7 MMOL/L (ref 8–16)
AST SERPL-CCNC: 31 U/L (ref 10–40)
BASOPHILS # BLD AUTO: 0.05 K/UL (ref 0–0.2)
BASOPHILS NFR BLD: 0.6 % (ref 0–1.9)
BILIRUB SERPL-MCNC: 0.1 MG/DL (ref 0.1–1)
BUN SERPL-MCNC: 16 MG/DL (ref 6–20)
CALCIUM SERPL-MCNC: 8.7 MG/DL (ref 8.7–10.5)
CHLORIDE SERPL-SCNC: 102 MMOL/L (ref 95–110)
CO2 SERPL-SCNC: 29 MMOL/L (ref 23–29)
CREAT SERPL-MCNC: 0.7 MG/DL (ref 0.5–1.4)
DIFFERENTIAL METHOD: ABNORMAL
EOSINOPHIL # BLD AUTO: 0.3 K/UL (ref 0–0.5)
EOSINOPHIL NFR BLD: 3.5 % (ref 0–8)
ERYTHROCYTE [DISTWIDTH] IN BLOOD BY AUTOMATED COUNT: 18.6 % (ref 11.5–14.5)
EST. GFR  (AFRICAN AMERICAN): >60 ML/MIN/1.73 M^2
EST. GFR  (NON AFRICAN AMERICAN): >60 ML/MIN/1.73 M^2
GLUCOSE SERPL-MCNC: 116 MG/DL (ref 70–110)
HCT VFR BLD AUTO: 33.3 % (ref 37–48.5)
HGB BLD-MCNC: 9.8 G/DL (ref 12–16)
LYMPHOCYTES # BLD AUTO: 2.4 K/UL (ref 1–4.8)
LYMPHOCYTES NFR BLD: 29.7 % (ref 18–48)
MAGNESIUM SERPL-MCNC: 2 MG/DL (ref 1.6–2.6)
MCH RBC QN AUTO: 23.7 PG (ref 27–31)
MCHC RBC AUTO-ENTMCNC: 29.4 G/DL (ref 32–36)
MCV RBC AUTO: 81 FL (ref 82–98)
MONOCYTES # BLD AUTO: 0.5 K/UL (ref 0.3–1)
MONOCYTES NFR BLD: 5.6 % (ref 4–15)
NEUTROPHILS # BLD AUTO: 4.8 K/UL (ref 1.8–7.7)
NEUTROPHILS NFR BLD: 60.6 % (ref 38–73)
PHOSPHATE SERPL-MCNC: 2.4 MG/DL (ref 2.7–4.5)
PLATELET # BLD AUTO: 524 K/UL (ref 150–350)
PMV BLD AUTO: 8.5 FL (ref 9.2–12.9)
POTASSIUM SERPL-SCNC: 4.3 MMOL/L (ref 3.5–5.1)
PREALB SERPL-MCNC: 24 MG/DL (ref 20–43)
PROT SERPL-MCNC: 6.7 G/DL (ref 6–8.4)
RBC # BLD AUTO: 4.13 M/UL (ref 4–5.4)
SODIUM SERPL-SCNC: 138 MMOL/L (ref 136–145)
WBC # BLD AUTO: 8.05 K/UL (ref 3.9–12.7)

## 2019-08-16 PROCEDURE — 83735 ASSAY OF MAGNESIUM: CPT

## 2019-08-16 PROCEDURE — 11000001 HC ACUTE MED/SURG PRIVATE ROOM

## 2019-08-16 PROCEDURE — B4185 PARENTERAL SOL 10 GM LIPIDS: HCPCS | Performed by: SURGERY

## 2019-08-16 PROCEDURE — 84100 ASSAY OF PHOSPHORUS: CPT

## 2019-08-16 PROCEDURE — 25000003 PHARM REV CODE 250: Performed by: STUDENT IN AN ORGANIZED HEALTH CARE EDUCATION/TRAINING PROGRAM

## 2019-08-16 PROCEDURE — 63600175 PHARM REV CODE 636 W HCPCS: Performed by: STUDENT IN AN ORGANIZED HEALTH CARE EDUCATION/TRAINING PROGRAM

## 2019-08-16 PROCEDURE — 84134 ASSAY OF PREALBUMIN: CPT

## 2019-08-16 PROCEDURE — A4216 STERILE WATER/SALINE, 10 ML: HCPCS | Performed by: STUDENT IN AN ORGANIZED HEALTH CARE EDUCATION/TRAINING PROGRAM

## 2019-08-16 PROCEDURE — 25000003 PHARM REV CODE 250: Performed by: SURGERY

## 2019-08-16 PROCEDURE — 80053 COMPREHEN METABOLIC PANEL: CPT

## 2019-08-16 PROCEDURE — 63600175 PHARM REV CODE 636 W HCPCS: Performed by: SURGERY

## 2019-08-16 PROCEDURE — A4217 STERILE WATER/SALINE, 500 ML: HCPCS | Performed by: STUDENT IN AN ORGANIZED HEALTH CARE EDUCATION/TRAINING PROGRAM

## 2019-08-16 PROCEDURE — 94761 N-INVAS EAR/PLS OXIMETRY MLT: CPT

## 2019-08-16 PROCEDURE — 85025 COMPLETE CBC W/AUTO DIFF WBC: CPT

## 2019-08-16 RX ORDER — MORPHINE 10 MG/ML
3 TINCTURE ORAL
Status: DISCONTINUED | OUTPATIENT
Start: 2019-08-16 | End: 2019-08-21

## 2019-08-16 RX ORDER — DIPHENHYDRAMINE HCL 25 MG
25 CAPSULE ORAL EVERY 6 HOURS PRN
Status: DISCONTINUED | OUTPATIENT
Start: 2019-08-16 | End: 2019-09-01 | Stop reason: HOSPADM

## 2019-08-16 RX ORDER — BACITRACIN ZINC 500 UNIT/G
14 OINTMENT (GRAM) TOPICAL 3 TIMES DAILY PRN
Status: DISCONTINUED | OUTPATIENT
Start: 2019-08-16 | End: 2019-09-01 | Stop reason: HOSPADM

## 2019-08-16 RX ADMIN — SODIUM CHLORIDE: 234 INJECTION INTRAMUSCULAR; INTRAVENOUS; SUBCUTANEOUS at 05:08

## 2019-08-16 RX ADMIN — PROMETHAZINE HYDROCHLORIDE 6.25 MG: 25 INJECTION INTRAMUSCULAR; INTRAVENOUS at 08:08

## 2019-08-16 RX ADMIN — LOPERAMIDE HYDROCHLORIDE 2 MG: 2 CAPSULE ORAL at 08:08

## 2019-08-16 RX ADMIN — ONDANSETRON 8 MG: 8 TABLET, ORALLY DISINTEGRATING ORAL at 10:08

## 2019-08-16 RX ADMIN — ENOXAPARIN SODIUM 80 MG: 100 INJECTION SUBCUTANEOUS at 08:08

## 2019-08-16 RX ADMIN — ONDANSETRON 8 MG: 8 TABLET, ORALLY DISINTEGRATING ORAL at 12:08

## 2019-08-16 RX ADMIN — SUCRALFATE 1 G: 1 SUSPENSION ORAL at 12:08

## 2019-08-16 RX ADMIN — OCTREOTIDE ACETATE 250 MCG/HR: 1000 INJECTION, SOLUTION INTRAVENOUS; SUBCUTANEOUS at 02:08

## 2019-08-16 RX ADMIN — OXYCODONE HYDROCHLORIDE 10 MG: 5 TABLET ORAL at 12:08

## 2019-08-16 RX ADMIN — MICONAZOLE NITRATE: 20 POWDER TOPICAL at 09:08

## 2019-08-16 RX ADMIN — MORPHINE 3 MG: 10 SOLUTION ORAL at 05:08

## 2019-08-16 RX ADMIN — BACITRACIN 14 G: 500 OINTMENT TOPICAL at 09:08

## 2019-08-16 RX ADMIN — SUCRALFATE 1 G: 1 SUSPENSION ORAL at 08:08

## 2019-08-16 RX ADMIN — SUCRALFATE 1 G: 1 SUSPENSION ORAL at 05:08

## 2019-08-16 RX ADMIN — LOPERAMIDE HYDROCHLORIDE 2 MG: 2 CAPSULE ORAL at 02:08

## 2019-08-16 RX ADMIN — OXYCODONE HYDROCHLORIDE 10 MG: 5 TABLET ORAL at 08:08

## 2019-08-16 RX ADMIN — ONDANSETRON 8 MG: 8 TABLET, ORALLY DISINTEGRATING ORAL at 04:08

## 2019-08-16 RX ADMIN — OXANDROLONE 10 MG: 2.5 TABLET ORAL at 08:08

## 2019-08-16 RX ADMIN — FAMOTIDINE 20 MG: 20 TABLET, FILM COATED ORAL at 08:08

## 2019-08-16 RX ADMIN — HYDROMORPHONE HYDROCHLORIDE 1 MG: 2 INJECTION INTRAMUSCULAR; INTRAVENOUS; SUBCUTANEOUS at 07:08

## 2019-08-16 RX ADMIN — CEFEPIME 2 G: 2 INJECTION, POWDER, FOR SOLUTION INTRAVENOUS at 02:08

## 2019-08-16 RX ADMIN — PROMETHAZINE HYDROCHLORIDE 6.25 MG: 25 INJECTION INTRAMUSCULAR; INTRAVENOUS at 02:08

## 2019-08-16 RX ADMIN — DIPHENHYDRAMINE HYDROCHLORIDE 25 MG: 25 CAPSULE ORAL at 09:08

## 2019-08-16 RX ADMIN — CEFEPIME 2 G: 2 INJECTION, POWDER, FOR SOLUTION INTRAVENOUS at 04:08

## 2019-08-16 RX ADMIN — SODIUM PHOSPHATE, MONOBASIC, MONOHYDRATE 30 MMOL: 276; 142 INJECTION, SOLUTION INTRAVENOUS at 08:08

## 2019-08-16 RX ADMIN — Medication 10 ML: at 04:08

## 2019-08-16 RX ADMIN — TRAMADOL HYDROCHLORIDE 50 MG: 50 TABLET, FILM COATED ORAL at 10:08

## 2019-08-16 RX ADMIN — TRAMADOL HYDROCHLORIDE 50 MG: 50 TABLET, FILM COATED ORAL at 03:08

## 2019-08-16 RX ADMIN — HYDROMORPHONE HYDROCHLORIDE 1 MG: 2 INJECTION INTRAMUSCULAR; INTRAVENOUS; SUBCUTANEOUS at 04:08

## 2019-08-16 RX ADMIN — OXYCODONE HYDROCHLORIDE 10 MG: 5 TABLET ORAL at 01:08

## 2019-08-16 RX ADMIN — I.V. FAT EMULSION 250 ML: 20 EMULSION INTRAVENOUS at 09:08

## 2019-08-16 RX ADMIN — MORPHINE 3 MG: 10 SOLUTION ORAL at 12:08

## 2019-08-16 RX ADMIN — MICONAZOLE NITRATE: 20 POWDER TOPICAL at 08:08

## 2019-08-16 RX ADMIN — OXYCODONE HYDROCHLORIDE 10 MG: 5 TABLET ORAL at 05:08

## 2019-08-16 RX ADMIN — CEFEPIME 2 G: 2 INJECTION, POWDER, FOR SOLUTION INTRAVENOUS at 08:08

## 2019-08-16 NOTE — PROGRESS NOTES
Neuroendocrine Surgery  Progress Note    Hospital course: 43 y.o. woman transferred from OSH with abdominal pain and sepsis (GNR). 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  Hospital stay complicated by bilateral pulmonary embolisms. Patient reports having no issues with gastric bypass until episode of abdominal pain and resulting surgery last month. She now reports having a few days of abdominal pain with associated nausea/vomiting, fever/chills. Patient has been on TPN at home since discharge from hospital last month via PICC line. RUE PICC line removed yesterday due to suspected sepsis and new PICC placed to [facility]. TPN initiated on HD 2. Blood cultures grew multiresistant E coli, antibiotic therapy changed from zosyn to cefepime.     S:  VSS  Tolerating diet with no nausea or vomiting  Output from jejunostomy increased when opium tincture was discontinued  Denies n/v   No f/c, cp/sob  +gas/stool to ostomy  Patient ambulating/voiding without issue    O:  Temp:  [96.1 °F (35.6 °C)-98 °F (36.7 °C)] 97.7 °F (36.5 °C)  Pulse:  [75-94] 82  Resp:  [16-20] 20  SpO2:  [95 %-98 %] 97 %  BP: (100-134)/(65-71) 102/71    Physical Exam:  Gen: no acute distress. Alert and oriented x3.  HEENT: normocephalic and atraumatic. EOMI.   Resp: unlabored respirations  CV: regular rate  Abd: soft, jejunostomy to LUQ with liquid stool and gas to bag. Denies abdominal pain, non-distended  Ext: warm and well perfused  MSK: normal range of motion, normal strength  Neuro: no focal deficits, normal sensation    I/O:  Intake/Output - Last 3 Shifts       08/14 0700 - 08/15 0659 08/15 0700 - 08/16 0659 08/16 0700 - 08/17 0659    P.O. 1040 2467     I.V. (mL/kg)  320 (4)     IV Piggyback  500     TPN  886.9     Total Intake(mL/kg) 1040 (12.7) 4173.9 (52)     Urine (mL/kg/hr)  2930 (1.5) 3470 (1.8)     Emesis/NG output 150 400     Stool 225 2450     Total Output 3305 6311     Net -2265 -5418.2            Urine Occurrence 6 x 1 x     Stool Occurrence 1 x 2 x         CBC:   Recent Labs     08/14/19  0500 08/15/19  0615 08/16/19  0415   WBC 7.63 7.30 8.05   HGB 8.6* 9.1* 9.8*   HCT 29.4* 31.1* 33.3*   * 467* 524*     CMP:  Recent Labs     08/14/19  0500 08/15/19  0615 08/16/19  0415    137 138   K 4.3 4.2 4.3    103 102   CO2 29 29 29   GLU 91 105 116*   BUN 12 14 16   CREATININE 0.7 0.6 0.7   CALCIUM 8.7 8.7 8.7   BILITOT 0.2 0.1 0.1   ALKPHOS 188* 176* 172*   AST 27 27 31   ALT 27 25 31   ANIONGAP 6* 5* 7*     Recent Labs     08/14/19  0500 08/15/19  0615 08/16/19  0415   MG 1.6 1.7 2.0   PHOS 3.2 2.8 2.4*     Prealbumin: 24    Micro:   Microbiology Results (last 7 days)     Procedure Component Value Units Date/Time    Blood culture [[id number]] Collected:  08/10/19 0936    Order Status:  Completed Specimen:  Blood from Line, PICC Left Brachial Updated:  08/15/19 1812     Blood Culture, Routine No growth after 5 days.    Blood culture [[id number]] Collected:  08/10/19 0936    Order Status:  Completed Specimen:  Blood from Line, PICC Left Brachial Updated:  08/15/19 1812     Blood Culture, Routine No growth after 5 days.        A/P: 43 y.o. 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 Patient with approx 15 cm jejunum distal to LOT therefore ability for nutritional absorption severely decreased. Dietary working on getting Enterade available in formulary. Added lipids to unit.    Prealbumin: 24, continue to follow   Continue bowel regimen with loperamide. Tincture of opium restarted at 3 mg   Strict documentation of I/Os   Pain/nausea  control PRN   Electrolyte replacement PRN   Continue cefepime, end date of 8/24 per ID   Surgical intervention scheduled next week    Halina Spencer MD   08/16/2019

## 2019-08-16 NOTE — PLAN OF CARE
Problem: Adult Inpatient Plan of Care  Goal: Plan of Care Review  Outcome: Ongoing (interventions implemented as appropriate)  Pt AAOx4. Pt complaints of abdominal pain controlled by PRN pain medication. Nausea intermittent with no emesis, PRN antiemetics administered per MD orders. TPN infusing at 50 ml/hr. Kerry infusing at 5 ml/hr. Pt ambulating independently to toilet. LUQ colostomy with stool and flatus. Bed locked in lowest position, bed alarm set and call belll within reach.  at the bedside. Will continue to monitor.

## 2019-08-17 LAB
ALBUMIN SERPL BCP-MCNC: 2.6 G/DL (ref 3.5–5.2)
ALP SERPL-CCNC: 155 U/L (ref 55–135)
ALT SERPL W/O P-5'-P-CCNC: 30 U/L (ref 10–44)
ANION GAP SERPL CALC-SCNC: 5 MMOL/L (ref 8–16)
AST SERPL-CCNC: 26 U/L (ref 10–40)
BASOPHILS # BLD AUTO: 0.04 K/UL (ref 0–0.2)
BASOPHILS NFR BLD: 0.5 % (ref 0–1.9)
BILIRUB SERPL-MCNC: 0.1 MG/DL (ref 0.1–1)
BUN SERPL-MCNC: 16 MG/DL (ref 6–20)
CALCIUM SERPL-MCNC: 8.9 MG/DL (ref 8.7–10.5)
CHLORIDE SERPL-SCNC: 102 MMOL/L (ref 95–110)
CO2 SERPL-SCNC: 30 MMOL/L (ref 23–29)
CREAT SERPL-MCNC: 0.7 MG/DL (ref 0.5–1.4)
DIFFERENTIAL METHOD: ABNORMAL
EOSINOPHIL # BLD AUTO: 0.3 K/UL (ref 0–0.5)
EOSINOPHIL NFR BLD: 3.6 % (ref 0–8)
ERYTHROCYTE [DISTWIDTH] IN BLOOD BY AUTOMATED COUNT: 18.6 % (ref 11.5–14.5)
EST. GFR  (AFRICAN AMERICAN): >60 ML/MIN/1.73 M^2
EST. GFR  (NON AFRICAN AMERICAN): >60 ML/MIN/1.73 M^2
GLUCOSE SERPL-MCNC: 118 MG/DL (ref 70–110)
HCT VFR BLD AUTO: 33.5 % (ref 37–48.5)
HGB BLD-MCNC: 9.7 G/DL (ref 12–16)
LYMPHOCYTES # BLD AUTO: 2.3 K/UL (ref 1–4.8)
LYMPHOCYTES NFR BLD: 28.7 % (ref 18–48)
MAGNESIUM SERPL-MCNC: 1.9 MG/DL (ref 1.6–2.6)
MCH RBC QN AUTO: 23.4 PG (ref 27–31)
MCHC RBC AUTO-ENTMCNC: 29 G/DL (ref 32–36)
MCV RBC AUTO: 81 FL (ref 82–98)
MONOCYTES # BLD AUTO: 0.7 K/UL (ref 0.3–1)
MONOCYTES NFR BLD: 8.6 % (ref 4–15)
NEUTROPHILS # BLD AUTO: 4.7 K/UL (ref 1.8–7.7)
NEUTROPHILS NFR BLD: 58.6 % (ref 38–73)
PHOSPHATE SERPL-MCNC: 2.6 MG/DL (ref 2.7–4.5)
PLATELET # BLD AUTO: 503 K/UL (ref 150–350)
PMV BLD AUTO: 8.6 FL (ref 9.2–12.9)
POTASSIUM SERPL-SCNC: 4.5 MMOL/L (ref 3.5–5.1)
PROT SERPL-MCNC: 6.6 G/DL (ref 6–8.4)
RBC # BLD AUTO: 4.15 M/UL (ref 4–5.4)
SODIUM SERPL-SCNC: 137 MMOL/L (ref 136–145)
WBC # BLD AUTO: 8.01 K/UL (ref 3.9–12.7)

## 2019-08-17 PROCEDURE — 80053 COMPREHEN METABOLIC PANEL: CPT

## 2019-08-17 PROCEDURE — 63600175 PHARM REV CODE 636 W HCPCS: Performed by: STUDENT IN AN ORGANIZED HEALTH CARE EDUCATION/TRAINING PROGRAM

## 2019-08-17 PROCEDURE — 25000003 PHARM REV CODE 250: Performed by: STUDENT IN AN ORGANIZED HEALTH CARE EDUCATION/TRAINING PROGRAM

## 2019-08-17 PROCEDURE — 85025 COMPLETE CBC W/AUTO DIFF WBC: CPT

## 2019-08-17 PROCEDURE — 63600175 PHARM REV CODE 636 W HCPCS: Performed by: SURGERY

## 2019-08-17 PROCEDURE — 84100 ASSAY OF PHOSPHORUS: CPT

## 2019-08-17 PROCEDURE — 25000003 PHARM REV CODE 250: Performed by: SURGERY

## 2019-08-17 PROCEDURE — 94761 N-INVAS EAR/PLS OXIMETRY MLT: CPT

## 2019-08-17 PROCEDURE — A4217 STERILE WATER/SALINE, 500 ML: HCPCS | Performed by: SURGERY

## 2019-08-17 PROCEDURE — 83735 ASSAY OF MAGNESIUM: CPT

## 2019-08-17 PROCEDURE — 11000001 HC ACUTE MED/SURG PRIVATE ROOM

## 2019-08-17 RX ADMIN — CEFEPIME 2 G: 2 INJECTION, POWDER, FOR SOLUTION INTRAVENOUS at 06:08

## 2019-08-17 RX ADMIN — MORPHINE 3 MG: 10 SOLUTION ORAL at 04:08

## 2019-08-17 RX ADMIN — MORPHINE 3 MG: 10 SOLUTION ORAL at 11:08

## 2019-08-17 RX ADMIN — PROMETHAZINE HYDROCHLORIDE 12.5 MG: 25 INJECTION INTRAMUSCULAR; INTRAVENOUS at 08:08

## 2019-08-17 RX ADMIN — SUCRALFATE 1 G: 1 SUSPENSION ORAL at 08:08

## 2019-08-17 RX ADMIN — CEFEPIME 2 G: 2 INJECTION, POWDER, FOR SOLUTION INTRAVENOUS at 08:08

## 2019-08-17 RX ADMIN — ONDANSETRON 8 MG: 8 TABLET, ORALLY DISINTEGRATING ORAL at 01:08

## 2019-08-17 RX ADMIN — CEFEPIME 2 G: 2 INJECTION, POWDER, FOR SOLUTION INTRAVENOUS at 02:08

## 2019-08-17 RX ADMIN — MICONAZOLE NITRATE: 20 POWDER TOPICAL at 08:08

## 2019-08-17 RX ADMIN — OXYCODONE HYDROCHLORIDE 10 MG: 5 TABLET ORAL at 06:08

## 2019-08-17 RX ADMIN — ONDANSETRON 8 MG: 8 TABLET, ORALLY DISINTEGRATING ORAL at 06:08

## 2019-08-17 RX ADMIN — FAMOTIDINE 20 MG: 20 TABLET, FILM COATED ORAL at 08:08

## 2019-08-17 RX ADMIN — SUCRALFATE 1 G: 1 SUSPENSION ORAL at 11:08

## 2019-08-17 RX ADMIN — ENOXAPARIN SODIUM 80 MG: 100 INJECTION SUBCUTANEOUS at 08:08

## 2019-08-17 RX ADMIN — OCTREOTIDE ACETATE 250 MCG/HR: 1000 INJECTION, SOLUTION INTRAVENOUS; SUBCUTANEOUS at 01:08

## 2019-08-17 RX ADMIN — SUCRALFATE 1 G: 1 SUSPENSION ORAL at 04:08

## 2019-08-17 RX ADMIN — PROMETHAZINE HYDROCHLORIDE 12.5 MG: 25 INJECTION INTRAMUSCULAR; INTRAVENOUS at 03:08

## 2019-08-17 RX ADMIN — SODIUM CHLORIDE: 234 INJECTION INTRAMUSCULAR; INTRAVENOUS; SUBCUTANEOUS at 05:08

## 2019-08-17 RX ADMIN — OXYCODONE HYDROCHLORIDE 10 MG: 5 TABLET ORAL at 01:08

## 2019-08-17 RX ADMIN — PROMETHAZINE HYDROCHLORIDE 12.5 MG: 25 INJECTION INTRAMUSCULAR; INTRAVENOUS at 09:08

## 2019-08-17 RX ADMIN — LOPERAMIDE HYDROCHLORIDE 2 MG: 2 CAPSULE ORAL at 08:08

## 2019-08-17 RX ADMIN — OXANDROLONE 10 MG: 2.5 TABLET ORAL at 08:08

## 2019-08-17 RX ADMIN — OXYCODONE HYDROCHLORIDE 10 MG: 5 TABLET ORAL at 08:08

## 2019-08-17 RX ADMIN — TRAMADOL HYDROCHLORIDE 50 MG: 50 TABLET, FILM COATED ORAL at 03:08

## 2019-08-17 RX ADMIN — PROMETHAZINE HYDROCHLORIDE 12.5 MG: 25 INJECTION INTRAMUSCULAR; INTRAVENOUS at 01:08

## 2019-08-17 RX ADMIN — LOPERAMIDE HYDROCHLORIDE 2 MG: 2 CAPSULE ORAL at 02:08

## 2019-08-17 RX ADMIN — MORPHINE 3 MG: 10 SOLUTION ORAL at 06:08

## 2019-08-17 RX ADMIN — HYDROMORPHONE HYDROCHLORIDE 1 MG: 2 INJECTION INTRAMUSCULAR; INTRAVENOUS; SUBCUTANEOUS at 07:08

## 2019-08-17 RX ADMIN — SUCRALFATE 1 G: 1 SUSPENSION ORAL at 07:08

## 2019-08-17 RX ADMIN — HYDROMORPHONE HYDROCHLORIDE 1 MG: 2 INJECTION INTRAMUSCULAR; INTRAVENOUS; SUBCUTANEOUS at 06:08

## 2019-08-17 NOTE — PLAN OF CARE
Problem: Adult Inpatient Plan of Care  Goal: Plan of Care Review  Outcome: Ongoing (interventions implemented as appropriate)  Safety maintained    Pt continues with nausea   She stated not as bad as earlier this week   Ostomy remains very active with green tainted very  watery stool with miniimal solids  Pt self managing ostomy and keeping staff abreast of her intake and output    tpn and octreotide continues    No recordable emesis  Ambulatory ad evonne in room   Vitals stable

## 2019-08-17 NOTE — PLAN OF CARE
Problem: Adult Inpatient Plan of Care  Goal: Plan of Care Review  Patient is awake and alert.  Ambulates in room and to bathroom independently.  Ostomy bag intact and patent and draining yellow stool.  Patient continues to receive Sandostatin, Clinimix and antibiotics.  Mother is at bedside.  Tolerates all meals.  Has complaints of nausea and nausea medications given with relief.  Also has complaints of pain and pain medications given with relief.  Safety is maintained with bed low, wheels locked, and side rails up.  Call light within reach.  Will continue to monitor.

## 2019-08-17 NOTE — PLAN OF CARE
Problem: Adult Inpatient Plan of Care  Goal: Plan of Care Review  Outcome: Ongoing (interventions implemented as appropriate)  Labs, notes, and orders reviewed.

## 2019-08-17 NOTE — PLAN OF CARE
Problem: Adult Inpatient Plan of Care  Goal: Patient-Specific Goal (Individualization)  Patient on room air with documented SATS and in no apparent distress. Will continue to monitor.

## 2019-08-17 NOTE — PROGRESS NOTES
Neuroendocrine Surgery  Progress Note    Hospital course: 43 y.o. woman transferred from OSH with abdominal pain and sepsis (GNR). 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  Hospital stay complicated by bilateral pulmonary embolisms. Patient reports having no issues with gastric bypass until episode of abdominal pain and resulting surgery last month. She now reports having a few days of abdominal pain with associated nausea/vomiting, fever/chills. Patient has been on TPN at home since discharge from hospital last month via PICC line. RUE PICC line removed yesterday due to suspected sepsis and new PICC placed to [facility]. TPN initiated on HD 2. Blood cultures grew multiresistant E coli, antibiotic therapy changed from zosyn to cefepime.     S:  VSS  Patient had increase nausea yesterday and overnight, increase Phenergan   Tolerating PO intake and TPN infusion  No f/c, cp/sob  +gas/stool to ostomy  Patient ambulating/voiding without issue    O:  Temp:  [97.7 °F (36.5 °C)-99 °F (37.2 °C)] 98 °F (36.7 °C)  Pulse:  [74-94] 74  Resp:  [16-20] 16  SpO2:  [97 %-98 %] 97 %  BP: (103-120)/(62-75) 103/62    Physical Exam:  Gen: no acute distress. Alert and oriented x3.  HEENT: normocephalic and atraumatic. EOMI.   Resp: unlabored respirations  CV: regular rate  Abd: soft, jejunostomy to LUQ with liquid stool and gas to bag. Denies abdominal pain, non-distended  Ext: warm and well perfused  MSK: normal range of motion, normal strength  Neuro: no focal deficits, normal sensation    I/O:  Intake/Output - Last 3 Shifts       08/15 0700 - 08/16 0659 08/16 0700 - 08/17 0659 08/17 0700 - 08/18 0659    P.O. 2467 2255     I.V. (mL/kg) 320 (4) 117.6 (1.5)     IV Piggyback 500 350     .9 1344.2     Total Intake(mL/kg) 4173.9 (52) 4066.8 (52.1)     Urine  (mL/kg/hr) 3470 (1.8) 2200 (1.2) 500 (3.6)    Emesis/NG output 400 100     Stool 2450 925 300    Total Output 6320 3225 800    Net -2146.2 +841.8 -800           Urine Occurrence 1 x      Stool Occurrence 2 x          CBC:   Recent Labs     08/15/19  0615 08/16/19  0415 08/17/19  0414   WBC 7.30 8.05 8.01   HGB 9.1* 9.8* 9.7*   HCT 31.1* 33.3* 33.5*   * 524* 503*     CMP:  Recent Labs     08/15/19  0615 08/16/19  0415 08/17/19  0414    138 137   K 4.2 4.3 4.5    102 102   CO2 29 29 30*    116* 118*   BUN 14 16 16   CREATININE 0.6 0.7 0.7   CALCIUM 8.7 8.7 8.9   BILITOT 0.1 0.1 0.1   ALKPHOS 176* 172* 155*   AST 27 31 26   ALT 25 31 30   ANIONGAP 5* 7* 5*     Recent Labs     08/15/19  0615 08/16/19 0415 08/17/19  0414   MG 1.7 2.0 1.9   PHOS 2.8 2.4* 2.6*     Prealbumin: 24    Micro:   Microbiology Results (last 7 days)     Procedure Component Value Units Date/Time    Blood culture [[id number]] Collected:  08/10/19 0936    Order Status:  Completed Specimen:  Blood from Line, PICC Left Brachial Updated:  08/15/19 1812     Blood Culture, Routine No growth after 5 days.    Blood culture [[id number]] Collected:  08/10/19 0936    Order Status:  Completed Specimen:  Blood from Line, PICC Left Brachial Updated:  08/15/19 1812     Blood Culture, Routine No growth after 5 days.        A/P: 43 y.o. female admitted 8/8/2019 with history of eddie en Y gastric bypass 13 years ago, DM, & small bowel volvulus s/p ex lap with extensive small bowel resection and end jejunostomy approx 1 month PTA     Continue regular diet   Continue TPN and Lipids. Patient with approx 15 cm jejunum distal to LOT therefore ability for nutritional absorption severely decreased. Enterade added   Prealbumin: 24, continue to follow   Continue bowel regimen with loperamide. Tincture of opium restarted at 3 mg. Will increase to 6mg tomorrow after nausea is better controlled   Strict documentation of I/Os   Pain/nausea control  PRN   Electrolyte replacement PRN   Continue cefepime, end date of 8/24 per ID   Surgical intervention scheduled next week    Dispo: Pending surgery    Halina Spencer MD   08/17/2019

## 2019-08-17 NOTE — PLAN OF CARE
Problem: Adult Inpatient Plan of Care  Goal: Plan of Care Review  Outcome: Ongoing (interventions implemented as appropriate)  Pt AAOx4. Pt with complaints of abdominal pain. PRN pain medication given per MD orders. Nausea intermittent. PRN antiemetic given per MD orders. 1x episode of emesis. TPN infusing at 50 ml/hr. Kerry infusing at 5 ml/hr. Pt ambulating independently to toilet. Bed locked in lowest position, bed alarm set and call bell within reach. Will continue to monitor.

## 2019-08-18 LAB
ALBUMIN SERPL BCP-MCNC: 2.8 G/DL (ref 3.5–5.2)
ALP SERPL-CCNC: 142 U/L (ref 55–135)
ALT SERPL W/O P-5'-P-CCNC: 36 U/L (ref 10–44)
ANION GAP SERPL CALC-SCNC: 6 MMOL/L (ref 8–16)
AST SERPL-CCNC: 29 U/L (ref 10–40)
BASOPHILS # BLD AUTO: 0.04 K/UL (ref 0–0.2)
BASOPHILS NFR BLD: 0.5 % (ref 0–1.9)
BILIRUB SERPL-MCNC: 0.2 MG/DL (ref 0.1–1)
BUN SERPL-MCNC: 17 MG/DL (ref 6–20)
CALCIUM SERPL-MCNC: 8.9 MG/DL (ref 8.7–10.5)
CHLORIDE SERPL-SCNC: 102 MMOL/L (ref 95–110)
CO2 SERPL-SCNC: 28 MMOL/L (ref 23–29)
CREAT SERPL-MCNC: 0.7 MG/DL (ref 0.5–1.4)
DIFFERENTIAL METHOD: ABNORMAL
EOSINOPHIL # BLD AUTO: 0.4 K/UL (ref 0–0.5)
EOSINOPHIL NFR BLD: 4.7 % (ref 0–8)
ERYTHROCYTE [DISTWIDTH] IN BLOOD BY AUTOMATED COUNT: 18.8 % (ref 11.5–14.5)
EST. GFR  (AFRICAN AMERICAN): >60 ML/MIN/1.73 M^2
EST. GFR  (NON AFRICAN AMERICAN): >60 ML/MIN/1.73 M^2
FERRITIN SERPL-MCNC: 166 NG/ML (ref 20–300)
FOLATE SERPL-MCNC: 10.3 NG/ML (ref 4–24)
GLUCOSE SERPL-MCNC: 97 MG/DL (ref 70–110)
HCT VFR BLD AUTO: 34 % (ref 37–48.5)
HGB BLD-MCNC: 10 G/DL (ref 12–16)
IRON SERPL-MCNC: 39 UG/DL (ref 30–160)
LYMPHOCYTES # BLD AUTO: 2.6 K/UL (ref 1–4.8)
LYMPHOCYTES NFR BLD: 33.3 % (ref 18–48)
MAGNESIUM SERPL-MCNC: 1.9 MG/DL (ref 1.6–2.6)
MCH RBC QN AUTO: 23.8 PG (ref 27–31)
MCHC RBC AUTO-ENTMCNC: 29.4 G/DL (ref 32–36)
MCV RBC AUTO: 81 FL (ref 82–98)
MONOCYTES # BLD AUTO: 0.7 K/UL (ref 0.3–1)
MONOCYTES NFR BLD: 8.6 % (ref 4–15)
NEUTROPHILS # BLD AUTO: 4.1 K/UL (ref 1.8–7.7)
NEUTROPHILS NFR BLD: 52.9 % (ref 38–73)
PHOSPHATE SERPL-MCNC: 3.1 MG/DL (ref 2.7–4.5)
PLATELET # BLD AUTO: 492 K/UL (ref 150–350)
PMV BLD AUTO: 9 FL (ref 9.2–12.9)
POTASSIUM SERPL-SCNC: 4.6 MMOL/L (ref 3.5–5.1)
PROT SERPL-MCNC: 6.7 G/DL (ref 6–8.4)
RBC # BLD AUTO: 4.2 M/UL (ref 4–5.4)
SATURATED IRON: 8 % (ref 20–50)
SODIUM SERPL-SCNC: 136 MMOL/L (ref 136–145)
TOTAL IRON BINDING CAPACITY: 463 UG/DL (ref 250–450)
TRANSFERRIN SERPL-MCNC: 313 MG/DL (ref 200–375)
VIT B12 SERPL-MCNC: 1452 PG/ML (ref 210–950)
WBC # BLD AUTO: 7.89 K/UL (ref 3.9–12.7)

## 2019-08-18 PROCEDURE — 80053 COMPREHEN METABOLIC PANEL: CPT

## 2019-08-18 PROCEDURE — 11000001 HC ACUTE MED/SURG PRIVATE ROOM

## 2019-08-18 PROCEDURE — 82525 ASSAY OF COPPER: CPT

## 2019-08-18 PROCEDURE — 36415 COLL VENOUS BLD VENIPUNCTURE: CPT

## 2019-08-18 PROCEDURE — 63600175 PHARM REV CODE 636 W HCPCS: Performed by: SURGERY

## 2019-08-18 PROCEDURE — 25000003 PHARM REV CODE 250: Performed by: STUDENT IN AN ORGANIZED HEALTH CARE EDUCATION/TRAINING PROGRAM

## 2019-08-18 PROCEDURE — 63600175 PHARM REV CODE 636 W HCPCS: Performed by: STUDENT IN AN ORGANIZED HEALTH CARE EDUCATION/TRAINING PROGRAM

## 2019-08-18 PROCEDURE — 84255 ASSAY OF SELENIUM: CPT

## 2019-08-18 PROCEDURE — 82607 VITAMIN B-12: CPT

## 2019-08-18 PROCEDURE — 84100 ASSAY OF PHOSPHORUS: CPT

## 2019-08-18 PROCEDURE — 94761 N-INVAS EAR/PLS OXIMETRY MLT: CPT

## 2019-08-18 PROCEDURE — 82728 ASSAY OF FERRITIN: CPT

## 2019-08-18 PROCEDURE — 83735 ASSAY OF MAGNESIUM: CPT

## 2019-08-18 PROCEDURE — 25000003 PHARM REV CODE 250: Performed by: SURGERY

## 2019-08-18 PROCEDURE — 84630 ASSAY OF ZINC: CPT

## 2019-08-18 PROCEDURE — 83540 ASSAY OF IRON: CPT

## 2019-08-18 PROCEDURE — 82746 ASSAY OF FOLIC ACID SERUM: CPT

## 2019-08-18 PROCEDURE — A4217 STERILE WATER/SALINE, 500 ML: HCPCS | Performed by: SURGERY

## 2019-08-18 PROCEDURE — 85025 COMPLETE CBC W/AUTO DIFF WBC: CPT

## 2019-08-18 RX ADMIN — HYDROMORPHONE HYDROCHLORIDE 1 MG: 2 INJECTION INTRAMUSCULAR; INTRAVENOUS; SUBCUTANEOUS at 08:08

## 2019-08-18 RX ADMIN — MICONAZOLE NITRATE: 20 POWDER TOPICAL at 08:08

## 2019-08-18 RX ADMIN — OXYCODONE HYDROCHLORIDE 10 MG: 5 TABLET ORAL at 02:08

## 2019-08-18 RX ADMIN — LOPERAMIDE HYDROCHLORIDE 2 MG: 2 CAPSULE ORAL at 02:08

## 2019-08-18 RX ADMIN — OXANDROLONE 10 MG: 2.5 TABLET ORAL at 08:08

## 2019-08-18 RX ADMIN — PROMETHAZINE HYDROCHLORIDE 12.5 MG: 25 INJECTION INTRAMUSCULAR; INTRAVENOUS at 10:08

## 2019-08-18 RX ADMIN — OXYCODONE HYDROCHLORIDE 10 MG: 5 TABLET ORAL at 11:08

## 2019-08-18 RX ADMIN — ONDANSETRON 8 MG: 8 TABLET, ORALLY DISINTEGRATING ORAL at 12:08

## 2019-08-18 RX ADMIN — MORPHINE 3 MG: 10 SOLUTION ORAL at 07:08

## 2019-08-18 RX ADMIN — SUCRALFATE 1 G: 1 SUSPENSION ORAL at 05:08

## 2019-08-18 RX ADMIN — LOPERAMIDE HYDROCHLORIDE 2 MG: 2 CAPSULE ORAL at 11:08

## 2019-08-18 RX ADMIN — ONDANSETRON 8 MG: 8 TABLET, ORALLY DISINTEGRATING ORAL at 08:08

## 2019-08-18 RX ADMIN — OXANDROLONE 10 MG: 2.5 TABLET ORAL at 09:08

## 2019-08-18 RX ADMIN — MICONAZOLE NITRATE: 20 POWDER TOPICAL at 09:08

## 2019-08-18 RX ADMIN — SUCRALFATE 1 G: 1 SUSPENSION ORAL at 07:08

## 2019-08-18 RX ADMIN — CEFEPIME 2 G: 2 INJECTION, POWDER, FOR SOLUTION INTRAVENOUS at 12:08

## 2019-08-18 RX ADMIN — CEFEPIME 2 G: 2 INJECTION, POWDER, FOR SOLUTION INTRAVENOUS at 04:08

## 2019-08-18 RX ADMIN — HYDROMORPHONE HYDROCHLORIDE 1 MG: 2 INJECTION INTRAMUSCULAR; INTRAVENOUS; SUBCUTANEOUS at 09:08

## 2019-08-18 RX ADMIN — MORPHINE 3 MG: 10 SOLUTION ORAL at 04:08

## 2019-08-18 RX ADMIN — OXYCODONE HYDROCHLORIDE 10 MG: 5 TABLET ORAL at 04:08

## 2019-08-18 RX ADMIN — HYDROMORPHONE HYDROCHLORIDE 1 MG: 2 INJECTION INTRAMUSCULAR; INTRAVENOUS; SUBCUTANEOUS at 04:08

## 2019-08-18 RX ADMIN — ONDANSETRON 8 MG: 8 TABLET, ORALLY DISINTEGRATING ORAL at 04:08

## 2019-08-18 RX ADMIN — ENOXAPARIN SODIUM 80 MG: 100 INJECTION SUBCUTANEOUS at 08:08

## 2019-08-18 RX ADMIN — FAMOTIDINE 20 MG: 20 TABLET, FILM COATED ORAL at 08:08

## 2019-08-18 RX ADMIN — LOPERAMIDE HYDROCHLORIDE 2 MG: 2 CAPSULE ORAL at 09:08

## 2019-08-18 RX ADMIN — PROMETHAZINE HYDROCHLORIDE 12.5 MG: 25 INJECTION INTRAMUSCULAR; INTRAVENOUS at 09:08

## 2019-08-18 RX ADMIN — SUCRALFATE 1 G: 1 SUSPENSION ORAL at 11:08

## 2019-08-18 RX ADMIN — SUCRALFATE 1 G: 1 SUSPENSION ORAL at 08:08

## 2019-08-18 RX ADMIN — OCTREOTIDE ACETATE 250 MCG/HR: 1000 INJECTION, SOLUTION INTRAVENOUS; SUBCUTANEOUS at 10:08

## 2019-08-18 RX ADMIN — FAMOTIDINE 20 MG: 20 TABLET, FILM COATED ORAL at 09:08

## 2019-08-18 RX ADMIN — PROMETHAZINE HYDROCHLORIDE 12.5 MG: 25 INJECTION INTRAMUSCULAR; INTRAVENOUS at 04:08

## 2019-08-18 RX ADMIN — SODIUM CHLORIDE: 234 INJECTION INTRAMUSCULAR; INTRAVENOUS; SUBCUTANEOUS at 04:08

## 2019-08-18 RX ADMIN — ENOXAPARIN SODIUM 80 MG: 100 INJECTION SUBCUTANEOUS at 09:08

## 2019-08-18 RX ADMIN — MORPHINE 3 MG: 10 SOLUTION ORAL at 11:08

## 2019-08-18 RX ADMIN — CEFEPIME 2 G: 2 INJECTION, POWDER, FOR SOLUTION INTRAVENOUS at 08:08

## 2019-08-18 NOTE — PLAN OF CARE
VN and patient discussed todays events and future plan of care.  Education given on lovenox injections as a blood clot preventative.  Safety measures maintained including bed locked and in lowest position with alarm on.  Call light within reach and patient verbalizes an understanding on how and why to use it.  Mother at bedside throughout shift.

## 2019-08-18 NOTE — PROGRESS NOTES
Neuroendocrine Surgery  Progress Note    Hospital course: 43 y.o. woman transferred from OSH with abdominal pain and sepsis (GNR). 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  Hospital stay complicated by bilateral pulmonary embolisms. Patient reports having no issues with gastric bypass until episode of abdominal pain and resulting surgery last month. She now reports having a few days of abdominal pain with associated nausea/vomiting, fever/chills. Patient has been on TPN at home since discharge from hospital last month via PICC line. RUE PICC line removed yesterday due to suspected sepsis and new PICC placed to [facility]. TPN initiated on HD 2. Blood cultures grew multiresistant E coli, antibiotic therapy changed from zosyn to cefepime.     S:  VSS  NAEON  Tolerating PO intake and TPN infusion  No f/c, cp/sob  +gas/stool to ostomy  Patient ambulating/voiding without issue    O:  Temp:  [97.7 °F (36.5 °C)-98.7 °F (37.1 °C)] 98 °F (36.7 °C)  Pulse:  [70-88] 84  Resp:  [16-18] 18  SpO2:  [96 %-98 %] 98 %  BP: ()/(57-67) 103/61    Physical Exam:  Gen: no acute distress. Alert and oriented x3.  HEENT: normocephalic and atraumatic. EOMI.   Resp: unlabored respirations  CV: regular rate  Abd: soft, jejunostomy to LUQ with liquid stool and gas to bag. Denies abdominal pain, non-distended  Ext: warm and well perfused  MSK: normal range of motion, normal strength  Neuro: no focal deficits, normal sensation    I/O:  Intake/Output - Last 3 Shifts       08/16 0700 - 08/17 0659 08/17 0700 - 08/18 0659 08/18 0700 - 08/19 0659    P.O. 2255 1467     I.V. (mL/kg) 117.6 (1.5) 122.4 (1.5)     IV Piggyback 350 300     TPN 1344.2 1320     Total Intake(mL/kg) 4066.8 (52.1) 3209.4 (40.1)     Urine (mL/kg/hr) 2200 (1.2) 2750 (1.4)     Emesis/NG output 100 0      Other  0     Stool 925 1375     Total Output 3225 4125     Net +841.8 -915.6            Urine Occurrence  0 x     Stool Occurrence  0 x     Emesis Occurrence  0 x         CBC:   Recent Labs     08/16/19 0415 08/17/19 0414 08/18/19 0522   WBC 8.05 8.01 7.89   HGB 9.8* 9.7* 10.0*   HCT 33.3* 33.5* 34.0*   * 503* 492*     CMP:  Recent Labs     08/16/19 0415 08/17/19 0414 08/18/19 0522    137 136   K 4.3 4.5 4.6    102 102   CO2 29 30* 28   * 118* 97   BUN 16 16 17   CREATININE 0.7 0.7 0.7   CALCIUM 8.7 8.9 8.9   BILITOT 0.1 0.1 0.2   ALKPHOS 172* 155* 142*   AST 31 26 29   ALT 31 30 36   ANIONGAP 7* 5* 6*     Recent Labs     08/16/19 0415 08/17/19 0414 08/18/19 0522   MG 2.0 1.9 1.9   PHOS 2.4* 2.6* 3.1     Prealbumin: 24    Micro:   Microbiology Results (last 7 days)     Procedure Component Value Units Date/Time    Blood culture [[id number]] Collected:  08/10/19 0936    Order Status:  Completed Specimen:  Blood from Line, PICC Left Brachial Updated:  08/15/19 1812     Blood Culture, Routine No growth after 5 days.    Blood culture [[id number]] Collected:  08/10/19 0936    Order Status:  Completed Specimen:  Blood from Line, PICC Left Brachial Updated:  08/15/19 1812     Blood Culture, Routine No growth after 5 days.        A/P: 44 y.o. female admitted 8/8/2019 with history of eddie en Y gastric bypass 13 years ago, DM, & small bowel volvulus s/p ex lap with extensive small bowel resection and end jejunostomy approx 1 month PTA     Continue regular diet   Continue TPN and Lipids. Patient with approx 15 cm jejunum distal to LOT therefore ability for nutritional absorption severely decreased. Enterade added   Continue bowel regimen with loperamide. Tincture of opium restarted at 3 mg.    Strict documentation of I/Os   Pain/nausea control PRN   Electrolyte replacement PRN   Continue cefepime, end date of 8/24 per ID   Surgical intervention scheduled next week (Tuesday vs  Wednesday)    Dispo: Pending surgery    Myles De Los Santos MD   08/18/2019

## 2019-08-18 NOTE — PLAN OF CARE
Problem: Adult Inpatient Plan of Care  Goal: Plan of Care Review  Outcome: Ongoing (interventions implemented as appropriate)     08/18/19 0634   Plan of Care Review   Plan of Care Reviewed With    08/18/19 0634   Plan of Care Review   Plan of Care Reviewed With patient      AAO,up to BR steady gait, TPN,Kerry and all meds admin per MAR,independent care of ostomy,bag intact,pain and nausea meds given with mod relief,safety maintained.

## 2019-08-19 LAB
ALBUMIN SERPL BCP-MCNC: 2.7 G/DL (ref 3.5–5.2)
ALP SERPL-CCNC: 141 U/L (ref 55–135)
ALT SERPL W/O P-5'-P-CCNC: 37 U/L (ref 10–44)
ANION GAP SERPL CALC-SCNC: 6 MMOL/L (ref 8–16)
AST SERPL-CCNC: 24 U/L (ref 10–40)
BASOPHILS # BLD AUTO: 0.06 K/UL (ref 0–0.2)
BASOPHILS NFR BLD: 0.8 % (ref 0–1.9)
BILIRUB SERPL-MCNC: 0.2 MG/DL (ref 0.1–1)
BUN SERPL-MCNC: 16 MG/DL (ref 6–20)
CALCIUM SERPL-MCNC: 9 MG/DL (ref 8.7–10.5)
CHLORIDE SERPL-SCNC: 103 MMOL/L (ref 95–110)
CO2 SERPL-SCNC: 28 MMOL/L (ref 23–29)
CREAT SERPL-MCNC: 0.7 MG/DL (ref 0.5–1.4)
DIFFERENTIAL METHOD: ABNORMAL
EOSINOPHIL # BLD AUTO: 0.2 K/UL (ref 0–0.5)
EOSINOPHIL NFR BLD: 3.1 % (ref 0–8)
ERYTHROCYTE [DISTWIDTH] IN BLOOD BY AUTOMATED COUNT: 18.7 % (ref 11.5–14.5)
EST. GFR  (AFRICAN AMERICAN): >60 ML/MIN/1.73 M^2
EST. GFR  (NON AFRICAN AMERICAN): >60 ML/MIN/1.73 M^2
GLUCOSE SERPL-MCNC: 107 MG/DL (ref 70–110)
HCT VFR BLD AUTO: 33.7 % (ref 37–48.5)
HGB BLD-MCNC: 9.8 G/DL (ref 12–16)
LYMPHOCYTES # BLD AUTO: 2.2 K/UL (ref 1–4.8)
LYMPHOCYTES NFR BLD: 31.4 % (ref 18–48)
MAGNESIUM SERPL-MCNC: 1.9 MG/DL (ref 1.6–2.6)
MCH RBC QN AUTO: 23.6 PG (ref 27–31)
MCHC RBC AUTO-ENTMCNC: 29.1 G/DL (ref 32–36)
MCV RBC AUTO: 81 FL (ref 82–98)
MONOCYTES # BLD AUTO: 0.8 K/UL (ref 0.3–1)
MONOCYTES NFR BLD: 11.8 % (ref 4–15)
NEUTROPHILS # BLD AUTO: 3.8 K/UL (ref 1.8–7.7)
NEUTROPHILS NFR BLD: 52.9 % (ref 38–73)
PHOSPHATE SERPL-MCNC: 3.2 MG/DL (ref 2.7–4.5)
PLATELET # BLD AUTO: 469 K/UL (ref 150–350)
PMV BLD AUTO: 9 FL (ref 9.2–12.9)
POTASSIUM SERPL-SCNC: 4.7 MMOL/L (ref 3.5–5.1)
PREALB SERPL-MCNC: 26 MG/DL (ref 20–43)
PROT SERPL-MCNC: 6.5 G/DL (ref 6–8.4)
RBC # BLD AUTO: 4.16 M/UL (ref 4–5.4)
SODIUM SERPL-SCNC: 137 MMOL/L (ref 136–145)
TRIGL SERPL-MCNC: 136 MG/DL (ref 30–150)
WBC # BLD AUTO: 7.13 K/UL (ref 3.9–12.7)

## 2019-08-19 PROCEDURE — 84100 ASSAY OF PHOSPHORUS: CPT

## 2019-08-19 PROCEDURE — 25000003 PHARM REV CODE 250: Performed by: SURGERY

## 2019-08-19 PROCEDURE — 84478 ASSAY OF TRIGLYCERIDES: CPT

## 2019-08-19 PROCEDURE — 25000003 PHARM REV CODE 250: Performed by: STUDENT IN AN ORGANIZED HEALTH CARE EDUCATION/TRAINING PROGRAM

## 2019-08-19 PROCEDURE — 63600175 PHARM REV CODE 636 W HCPCS: Performed by: STUDENT IN AN ORGANIZED HEALTH CARE EDUCATION/TRAINING PROGRAM

## 2019-08-19 PROCEDURE — B4185 PARENTERAL SOL 10 GM LIPIDS: HCPCS | Performed by: SURGERY

## 2019-08-19 PROCEDURE — 80053 COMPREHEN METABOLIC PANEL: CPT

## 2019-08-19 PROCEDURE — 85025 COMPLETE CBC W/AUTO DIFF WBC: CPT

## 2019-08-19 PROCEDURE — A4217 STERILE WATER/SALINE, 500 ML: HCPCS | Performed by: SURGERY

## 2019-08-19 PROCEDURE — 97803 MED NUTRITION INDIV SUBSEQ: CPT

## 2019-08-19 PROCEDURE — 84134 ASSAY OF PREALBUMIN: CPT

## 2019-08-19 PROCEDURE — 94761 N-INVAS EAR/PLS OXIMETRY MLT: CPT

## 2019-08-19 PROCEDURE — 63600175 PHARM REV CODE 636 W HCPCS: Performed by: SURGERY

## 2019-08-19 PROCEDURE — 83735 ASSAY OF MAGNESIUM: CPT

## 2019-08-19 PROCEDURE — 11000001 HC ACUTE MED/SURG PRIVATE ROOM

## 2019-08-19 RX ORDER — ONDANSETRON 2 MG/ML
4 INJECTION INTRAMUSCULAR; INTRAVENOUS ONCE
Status: COMPLETED | OUTPATIENT
Start: 2019-08-19 | End: 2019-08-19

## 2019-08-19 RX ORDER — ONDANSETRON 2 MG/ML
4 INJECTION INTRAMUSCULAR; INTRAVENOUS EVERY 6 HOURS PRN
Status: DISCONTINUED | OUTPATIENT
Start: 2019-08-19 | End: 2019-08-20

## 2019-08-19 RX ADMIN — LOPERAMIDE HYDROCHLORIDE 2 MG: 2 CAPSULE ORAL at 08:08

## 2019-08-19 RX ADMIN — PROMETHAZINE HYDROCHLORIDE 12.5 MG: 25 INJECTION INTRAMUSCULAR; INTRAVENOUS at 02:08

## 2019-08-19 RX ADMIN — MORPHINE 3 MG: 10 SOLUTION ORAL at 10:08

## 2019-08-19 RX ADMIN — SUCRALFATE 1 G: 1 SUSPENSION ORAL at 10:08

## 2019-08-19 RX ADMIN — SODIUM CHLORIDE: 234 INJECTION INTRAMUSCULAR; INTRAVENOUS; SUBCUTANEOUS at 05:08

## 2019-08-19 RX ADMIN — HYDROMORPHONE HYDROCHLORIDE 1 MG: 2 INJECTION INTRAMUSCULAR; INTRAVENOUS; SUBCUTANEOUS at 02:08

## 2019-08-19 RX ADMIN — LOPERAMIDE HYDROCHLORIDE 2 MG: 2 CAPSULE ORAL at 10:08

## 2019-08-19 RX ADMIN — I.V. FAT EMULSION 250 ML: 20 EMULSION INTRAVENOUS at 10:08

## 2019-08-19 RX ADMIN — ONDANSETRON 8 MG: 8 TABLET, ORALLY DISINTEGRATING ORAL at 04:08

## 2019-08-19 RX ADMIN — SUCRALFATE 1 G: 1 SUSPENSION ORAL at 05:08

## 2019-08-19 RX ADMIN — HYDROMORPHONE HYDROCHLORIDE 1 MG: 2 INJECTION INTRAMUSCULAR; INTRAVENOUS; SUBCUTANEOUS at 06:08

## 2019-08-19 RX ADMIN — OCTREOTIDE ACETATE 250 MCG/HR: 1000 INJECTION, SOLUTION INTRAVENOUS; SUBCUTANEOUS at 07:08

## 2019-08-19 RX ADMIN — PROMETHAZINE HYDROCHLORIDE 12.5 MG: 25 INJECTION INTRAMUSCULAR; INTRAVENOUS at 07:08

## 2019-08-19 RX ADMIN — ONDANSETRON 4 MG: 2 INJECTION INTRAMUSCULAR; INTRAVENOUS at 09:08

## 2019-08-19 RX ADMIN — ONDANSETRON 4 MG: 2 INJECTION INTRAMUSCULAR; INTRAVENOUS at 05:08

## 2019-08-19 RX ADMIN — OXYCODONE HYDROCHLORIDE 10 MG: 5 TABLET ORAL at 05:08

## 2019-08-19 RX ADMIN — FAMOTIDINE 20 MG: 20 TABLET, FILM COATED ORAL at 08:08

## 2019-08-19 RX ADMIN — MICONAZOLE NITRATE: 20 POWDER TOPICAL at 10:08

## 2019-08-19 RX ADMIN — MICONAZOLE NITRATE: 20 POWDER TOPICAL at 08:08

## 2019-08-19 RX ADMIN — ONDANSETRON 4 MG: 2 INJECTION INTRAMUSCULAR; INTRAVENOUS at 11:08

## 2019-08-19 RX ADMIN — FAMOTIDINE 20 MG: 20 TABLET, FILM COATED ORAL at 10:08

## 2019-08-19 RX ADMIN — ENOXAPARIN SODIUM 80 MG: 100 INJECTION SUBCUTANEOUS at 08:08

## 2019-08-19 RX ADMIN — OXANDROLONE 10 MG: 2.5 TABLET ORAL at 10:08

## 2019-08-19 RX ADMIN — CEFEPIME 2 G: 2 INJECTION, POWDER, FOR SOLUTION INTRAVENOUS at 08:08

## 2019-08-19 RX ADMIN — ENOXAPARIN SODIUM 80 MG: 100 INJECTION SUBCUTANEOUS at 10:08

## 2019-08-19 RX ADMIN — CEFEPIME 2 G: 2 INJECTION, POWDER, FOR SOLUTION INTRAVENOUS at 02:08

## 2019-08-19 RX ADMIN — LOPERAMIDE HYDROCHLORIDE 2 MG: 2 CAPSULE ORAL at 02:08

## 2019-08-19 RX ADMIN — OXANDROLONE 10 MG: 2.5 TABLET ORAL at 08:08

## 2019-08-19 RX ADMIN — SUCRALFATE 1 G: 1 SUSPENSION ORAL at 08:08

## 2019-08-19 RX ADMIN — PROMETHAZINE HYDROCHLORIDE 12.5 MG: 25 INJECTION INTRAMUSCULAR; INTRAVENOUS at 08:08

## 2019-08-19 RX ADMIN — CEFEPIME 2 G: 2 INJECTION, POWDER, FOR SOLUTION INTRAVENOUS at 04:08

## 2019-08-19 RX ADMIN — HYDROMORPHONE HYDROCHLORIDE 1 MG: 2 INJECTION INTRAMUSCULAR; INTRAVENOUS; SUBCUTANEOUS at 10:08

## 2019-08-19 NOTE — PROGRESS NOTES
Neuroendocrine Surgery  Progress Note    Hospital course: 43 y.o. woman transferred from OSH with abdominal pain and sepsis (GNR). 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  Hospital stay complicated by bilateral pulmonary embolisms. Patient reports having no issues with gastric bypass until episode of abdominal pain and resulting surgery last month. She now reports having a few days of abdominal pain with associated nausea/vomiting, fever/chills. Patient has been on TPN at home since discharge from hospital last month via PICC line. RUE PICC line removed yesterday due to suspected sepsis and new PICC placed to [facility]. TPN initiated on HD 2. Blood cultures grew multiresistant E coli, antibiotic therapy changed from zosyn to cefepime.     S:  VSS  Nausea and two episodes of vomiting overnight  Tolerating PO intake and TPN infusion  No f/c, cp/sob  +gas/stool to ostomy  Patient ambulating/voiding without issue    O:  Temp:  [97.8 °F (36.6 °C)-98.7 °F (37.1 °C)] 98.1 °F (36.7 °C)  Pulse:  [73-89] 86  Resp:  [18] 18  SpO2:  [97 %-100 %] 97 %  BP: (102-130)/(64-77) 119/77    Physical Exam:  Gen: no acute distress. Alert and oriented x3.  HEENT: normocephalic and atraumatic. EOMI.   Resp: unlabored respirations  CV: regular rate  Abd: soft, jejunostomy to LUQ with liquid stool and gas to bag. Denies abdominal pain, non-distended  Ext: warm and well perfused  MSK: normal range of motion, normal strength  Neuro: no focal deficits, normal sensation    I/O:  Intake/Output - Last 3 Shifts       08/17 0700 - 08/18 0659 08/18 0700 - 08/19 0659 08/19 0700 - 08/20 0659    P.O. 1467 837     I.V. (mL/kg) 122.4 (1.5) 59.8 (0.7)     IV Piggyback 300 150     TPN 1320 600     Total Intake(mL/kg) 3209.4 (40.1) 1646.8 (20.5)     Urine (mL/kg/hr) 2750 (1.4) 2100  (1.1)     Emesis/NG output 0 300     Other 0      Stool 1375 450     Total Output 4125 2850     Net -915.6 -1203.3            Urine Occurrence 0 x      Stool Occurrence 0 x      Emesis Occurrence 0 x          CBC:   Recent Labs     08/17/19 0414 08/18/19 0522 08/19/19  0606   WBC 8.01 7.89 7.13   HGB 9.7* 10.0* 9.8*   HCT 33.5* 34.0* 33.7*   * 492* 469*     CMP:  Recent Labs     08/17/19 0414 08/18/19 0522 08/19/19  0606    136 137   K 4.5 4.6 4.7    102 103   CO2 30* 28 28   * 97 107   BUN 16 17 16   CREATININE 0.7 0.7 0.7   CALCIUM 8.9 8.9 9.0   BILITOT 0.1 0.2 0.2   ALKPHOS 155* 142* 141*   AST 26 29 24   ALT 30 36 37   ANIONGAP 5* 6* 6*     Recent Labs     08/17/19 0414 08/18/19 0522 08/19/19  0606   MG 1.9 1.9 1.9   PHOS 2.6* 3.1 3.2     Prealbumin: 26 today    Micro:   Microbiology Results (last 7 days)     Procedure Component Value Units Date/Time    Blood culture [[id number]] Collected:  08/10/19 0936    Order Status:  Completed Specimen:  Blood from Line, PICC Left Brachial Updated:  08/15/19 1812     Blood Culture, Routine No growth after 5 days.    Blood culture [[id number]] Collected:  08/10/19 0936    Order Status:  Completed Specimen:  Blood from Line, PICC Left Brachial Updated:  08/15/19 1812     Blood Culture, Routine No growth after 5 days.        A/P: 44 y.o. female admitted 8/8/2019 with history of eddie en Y gastric bypass 13 years ago, DM, & small bowel volvulus s/p ex lap with extensive small bowel resection and end jejunostomy approx 1 month PTA     Continue regular diet   Continue TPN and Lipids. Patient with approx 15 cm jejunum distal to LOT therefore ability for nutritional absorption severely decreased. Enterade added   Continue bowel regimen with loperamide. Tincture of opium restarted at 3 mg.    Strict documentation of I/Os   Pain/nausea control PRN   Electrolyte replacement PRN   Continue cefepime, end date of 8/24 per ID   Surgical intervention  scheduled next week (Tuesday vs Wednesday)    Dispo: Pending surgery    Myles De Los Santos MD   08/19/2019

## 2019-08-19 NOTE — NURSING
Pt reports having episode of emesis. Pt also c/o continuous nausea. Dr. De Los Santos was notified. Orders for zofran received.

## 2019-08-19 NOTE — PROGRESS NOTES
"[facility]  Adult Nutrition  Progress Note    SUMMARY       Recommendations    Recommendation:   1. Continue to encourrage intake at meals as tolerated.   2. Continue TPN in preparation for surgery.    Goals:  1. Pt will tolerate TPN.   2. Pt will tolerate po diet with at least 50% intake at meals.  Nutrition Goal Status: progressing towards goal  Communication of RD Recs: reviewed with RN(Skylar)    Reason for Assessment  Reason For Assessment: RD follow-up  Diagnosis: (sepsis)  Relevant Medical History: DM, anxiety, ADD, gastric bypass, expl lap, cholecystectomy  General Information Comments: Pt on Regular diet with Optisource & Enterade. Tolerating po diet with fair intake at meals. Also receiving custom TPN at 50ml/hr with IVFE 3xweekly. PICC line. Colostomy. Awaiting surgery this week. NFPE completed 8/10-mild wasting of temples/clavicles.  Nutrition Discharge Planning: pt to d/c on Regular diet & possible TPN    Nutrition Risk Screen  Nutrition Risk Screen: tube feeding or parenteral nutrition    Nutrition/Diet History  Food Preferences: no Hinduism or cultural food prefs identified  Spiritual, Cultural Beliefs, Christian Practices, Values that Affect Care: no  Factors Affecting Nutritional Intake: altered gastrointestinal function    Anthropometrics  Temp: 97.8 °F (36.6 °C)  Height Method: Stated  Height: 5' 5" (165.1 cm)  Height (inches): 65 in  Weight Method: Bed Scale  Weight: 80.5 kg (177 lb 7.5 oz)  Weight (lb): 177.47 lb  Ideal Body Weight (IBW), Female: 125 lb  % Ideal Body Weight, Female (lb): 134.74 lb  BMI (Calculated): 28.1  BMI Grade: 25 - 29.9 - overweight  Usual Body Weight (UBW), k.8 kg  % Usual Body Weight: 93.59  % Weight Change From Usual Weight: -6.6 %     Lab/Procedures/Meds  Pertinent Labs Reviewed: reviewed  Pertinent Labs Comments: Alb 2.7L  Pertinent Medications Reviewed: reviewed  Pertinent Medications Comments: loperamide, oxandrin, opium tincture, " fernieostatin    Estimated/Assessed Needs  Weight Used For Calorie Calculations: 80.5 kg (177 lb 7.5 oz)  Energy Calorie Requirements (kcal): 2030  Energy Need Method: [place]-St Jeor(x1.4)  Protein Requirements: 81g (1.0g/kg)  Weight Used For Protein Calculations: 80.5 kg (177 lb 7.5 oz)  Estimated Fluid Requirement Method: RDA Method  RDA Method (mL): 2030     Nutrition Prescription Ordered  Current Diet Order: Regular  Current Nutrition Support Formula Ordered: (custom TPN)  Current Nutrition Support Rate Ordered: 50 (ml)  Current Nutrition Support Frequency Ordered: ml/hr with IVFE 3xweekly  Oral Nutrition Supplement: Optisource    Evaluation of Received Nutrient/Fluid Intake  Parenteral Calories (kcal): 1282  Parenteral Protein (gm): 108  Parenteral Fluid (mL): 1200  Lipid Calories (kcals): 214 kcals  GIR (Glucose Infusion Rate) (mg/kg/min): 2.1 mg/kg/min  Total Calories (kcal): 1496  % Kcal Needs: 73  % Protein Needs: 133  I/O: 1647/2850  Energy Calories Required: meeting needs  Protein Required: meeting needs  Fluid Required: meeting needs  Comments: LBM 8/19  % Intake of Estimated Energy Needs: 75 - 100 %  % Meal Intake: 50 - 75 %    Nutrition Risk  Level of Risk/Frequency of Follow-up: (2xweekly)     Assessment and Plan  Protein calorie malnutrition  Malnutrition in the context of Acute Illness/Injury    Related to (etiology):  S/p surgery/sepsis    Signs and Symptoms (as evidenced by):  Energy Intake: <50% of estimated energy requirement for 1 month  Muscle Mass Depletion: mild depletion of temples, clavicle region and scapular region   Weight Loss: 6% x 1 month     Interventions:  Parenteral nutrition  Commercial beverage    Nutrition Diagnosis Status:  Continues       Monitor and Evaluation  Food and Nutrient Intake: food and beverage intake, parenteral nutrition intake  Food and Nutrient Adminstration: diet order, enteral and parenteral nutrition administration  Physical Activity and Function:  nutrition-related ADLs and IADLs  Anthropometric Measurements: weight  Biochemical Data, Medical Tests and Procedures: electrolyte and renal panel  Nutrition-Focused Physical Findings: overall appearance     Malnutrition Assessment  Malnutrition Type: acute illness or injury  Weight Loss (Malnutrition): greater than 5% in 1 month   [place] (Muscle Loss): mild depletion  Clavicle Bone Region (Muscle Loss): mild depletion   Subcutaneous Fat Loss (Final Summary): well nourished  Muscle Loss Evaluation (Final Summary): mild protein-calorie malnutrition       Nutrition Follow-Up  RD Follow-up?: Yes

## 2019-08-19 NOTE — PLAN OF CARE
Problem: Adult Inpatient Plan of Care  Goal: Plan of Care Review  Outcome: Ongoing (interventions implemented as appropriate)  Patient resting in bed, spouse at bedside. AAOx4. Medications administered per order. Patient given phenergan for nausea and vomiting. PRN hydromorphine and zofran administered for pain and nausea. PICC CDI, infusing. Patient independent with colostomy and voiding. Encouraged to call with questions and concerns. Safety maintained. Will continue to monitor.

## 2019-08-20 ENCOUNTER — ANESTHESIA EVENT (OUTPATIENT)
Dept: SURGERY | Facility: HOSPITAL | Age: 44
DRG: 264 | End: 2019-08-20
Payer: COMMERCIAL

## 2019-08-20 LAB
ALBUMIN SERPL BCP-MCNC: 2.7 G/DL (ref 3.5–5.2)
ALP SERPL-CCNC: 152 U/L (ref 55–135)
ALT SERPL W/O P-5'-P-CCNC: 32 U/L (ref 10–44)
ANION GAP SERPL CALC-SCNC: 7 MMOL/L (ref 8–16)
AST SERPL-CCNC: 23 U/L (ref 10–40)
BASOPHILS # BLD AUTO: 0.06 K/UL (ref 0–0.2)
BASOPHILS NFR BLD: 0.5 % (ref 0–1.9)
BILIRUB SERPL-MCNC: 0.2 MG/DL (ref 0.1–1)
BUN SERPL-MCNC: 16 MG/DL (ref 6–20)
CALCIUM SERPL-MCNC: 9 MG/DL (ref 8.7–10.5)
CHLORIDE SERPL-SCNC: 102 MMOL/L (ref 95–110)
CO2 SERPL-SCNC: 27 MMOL/L (ref 23–29)
COPPER SERPL-MCNC: 1203 UG/L (ref 810–1990)
CREAT SERPL-MCNC: 0.7 MG/DL (ref 0.5–1.4)
DIFFERENTIAL METHOD: ABNORMAL
EOSINOPHIL # BLD AUTO: 0.2 K/UL (ref 0–0.5)
EOSINOPHIL NFR BLD: 2.2 % (ref 0–8)
ERYTHROCYTE [DISTWIDTH] IN BLOOD BY AUTOMATED COUNT: 18.7 % (ref 11.5–14.5)
EST. GFR  (AFRICAN AMERICAN): >60 ML/MIN/1.73 M^2
EST. GFR  (NON AFRICAN AMERICAN): >60 ML/MIN/1.73 M^2
GLUCOSE SERPL-MCNC: 109 MG/DL (ref 70–110)
HCT VFR BLD AUTO: 34 % (ref 37–48.5)
HGB BLD-MCNC: 9.9 G/DL (ref 12–16)
LYMPHOCYTES # BLD AUTO: 2.5 K/UL (ref 1–4.8)
LYMPHOCYTES NFR BLD: 22.6 % (ref 18–48)
MAGNESIUM SERPL-MCNC: 1.7 MG/DL (ref 1.6–2.6)
MCH RBC QN AUTO: 23.6 PG (ref 27–31)
MCHC RBC AUTO-ENTMCNC: 29.1 G/DL (ref 32–36)
MCV RBC AUTO: 81 FL (ref 82–98)
MONOCYTES # BLD AUTO: 1.1 K/UL (ref 0.3–1)
MONOCYTES NFR BLD: 10.2 % (ref 4–15)
NEUTROPHILS # BLD AUTO: 7.1 K/UL (ref 1.8–7.7)
NEUTROPHILS NFR BLD: 64.5 % (ref 38–73)
PHOSPHATE SERPL-MCNC: 3.3 MG/DL (ref 2.7–4.5)
PLATELET # BLD AUTO: 495 K/UL (ref 150–350)
PMV BLD AUTO: 9.3 FL (ref 9.2–12.9)
POTASSIUM SERPL-SCNC: 4.4 MMOL/L (ref 3.5–5.1)
PROT SERPL-MCNC: 6.5 G/DL (ref 6–8.4)
RBC # BLD AUTO: 4.2 M/UL (ref 4–5.4)
SODIUM SERPL-SCNC: 136 MMOL/L (ref 136–145)
WBC # BLD AUTO: 11.03 K/UL (ref 3.9–12.7)
ZINC SERPL-MCNC: 57 UG/DL (ref 60–130)

## 2019-08-20 PROCEDURE — 84100 ASSAY OF PHOSPHORUS: CPT

## 2019-08-20 PROCEDURE — 25000003 PHARM REV CODE 250: Performed by: STUDENT IN AN ORGANIZED HEALTH CARE EDUCATION/TRAINING PROGRAM

## 2019-08-20 PROCEDURE — 63600175 PHARM REV CODE 636 W HCPCS: Performed by: STUDENT IN AN ORGANIZED HEALTH CARE EDUCATION/TRAINING PROGRAM

## 2019-08-20 PROCEDURE — 11000001 HC ACUTE MED/SURG PRIVATE ROOM

## 2019-08-20 PROCEDURE — A4217 STERILE WATER/SALINE, 500 ML: HCPCS | Performed by: SURGERY

## 2019-08-20 PROCEDURE — 83735 ASSAY OF MAGNESIUM: CPT

## 2019-08-20 PROCEDURE — 63600175 PHARM REV CODE 636 W HCPCS: Performed by: SURGERY

## 2019-08-20 PROCEDURE — 25000003 PHARM REV CODE 250: Performed by: SURGERY

## 2019-08-20 PROCEDURE — 85025 COMPLETE CBC W/AUTO DIFF WBC: CPT

## 2019-08-20 PROCEDURE — 80053 COMPREHEN METABOLIC PANEL: CPT

## 2019-08-20 PROCEDURE — 94761 N-INVAS EAR/PLS OXIMETRY MLT: CPT

## 2019-08-20 RX ORDER — MAGNESIUM SULFATE HEPTAHYDRATE 40 MG/ML
2 INJECTION, SOLUTION INTRAVENOUS
Status: COMPLETED | OUTPATIENT
Start: 2019-08-20 | End: 2019-08-20

## 2019-08-20 RX ORDER — ONDANSETRON 2 MG/ML
8 INJECTION INTRAMUSCULAR; INTRAVENOUS EVERY 6 HOURS PRN
Status: DISCONTINUED | OUTPATIENT
Start: 2019-08-20 | End: 2019-09-01 | Stop reason: HOSPADM

## 2019-08-20 RX ORDER — HYDROMORPHONE HYDROCHLORIDE 2 MG/ML
1 INJECTION, SOLUTION INTRAMUSCULAR; INTRAVENOUS; SUBCUTANEOUS ONCE
Status: COMPLETED | OUTPATIENT
Start: 2019-08-20 | End: 2019-08-20

## 2019-08-20 RX ADMIN — MAGNESIUM SULFATE IN WATER 2 G: 40 INJECTION, SOLUTION INTRAVENOUS at 09:08

## 2019-08-20 RX ADMIN — OCTREOTIDE ACETATE 250 MCG/HR: 1000 INJECTION, SOLUTION INTRAVENOUS; SUBCUTANEOUS at 09:08

## 2019-08-20 RX ADMIN — PROMETHAZINE HYDROCHLORIDE 25 MG: 25 INJECTION INTRAMUSCULAR; INTRAVENOUS at 08:08

## 2019-08-20 RX ADMIN — PROMETHAZINE HYDROCHLORIDE 12.5 MG: 25 INJECTION INTRAMUSCULAR; INTRAVENOUS at 02:08

## 2019-08-20 RX ADMIN — CEFEPIME 2 G: 2 INJECTION, POWDER, FOR SOLUTION INTRAVENOUS at 05:08

## 2019-08-20 RX ADMIN — ENOXAPARIN SODIUM 80 MG: 100 INJECTION SUBCUTANEOUS at 09:08

## 2019-08-20 RX ADMIN — MICONAZOLE NITRATE: 20 POWDER TOPICAL at 09:08

## 2019-08-20 RX ADMIN — ONDANSETRON 8 MG: 2 INJECTION INTRAMUSCULAR; INTRAVENOUS at 03:08

## 2019-08-20 RX ADMIN — HYDROMORPHONE HYDROCHLORIDE 1 MG: 2 INJECTION INTRAMUSCULAR; INTRAVENOUS; SUBCUTANEOUS at 02:08

## 2019-08-20 RX ADMIN — HYDROMORPHONE HYDROCHLORIDE 1 MG: 2 INJECTION INTRAMUSCULAR; INTRAVENOUS; SUBCUTANEOUS at 06:08

## 2019-08-20 RX ADMIN — LORAZEPAM 2 MG: 2 INJECTION INTRAMUSCULAR; INTRAVENOUS at 09:08

## 2019-08-20 RX ADMIN — ONDANSETRON 4 MG: 2 INJECTION INTRAMUSCULAR; INTRAVENOUS at 05:08

## 2019-08-20 RX ADMIN — HYDROMORPHONE HYDROCHLORIDE 1 MG: 2 INJECTION INTRAMUSCULAR; INTRAVENOUS; SUBCUTANEOUS at 03:08

## 2019-08-20 RX ADMIN — HYDROMORPHONE HYDROCHLORIDE 1 MG: 2 INJECTION INTRAMUSCULAR; INTRAVENOUS; SUBCUTANEOUS at 07:08

## 2019-08-20 RX ADMIN — SODIUM CHLORIDE: 234 INJECTION INTRAMUSCULAR; INTRAVENOUS; SUBCUTANEOUS at 05:08

## 2019-08-20 RX ADMIN — CEFEPIME 2 G: 2 INJECTION, POWDER, FOR SOLUTION INTRAVENOUS at 08:08

## 2019-08-20 RX ADMIN — HYDROMORPHONE HYDROCHLORIDE 1 MG: 2 INJECTION INTRAMUSCULAR; INTRAVENOUS; SUBCUTANEOUS at 01:08

## 2019-08-20 RX ADMIN — CEFEPIME 2 G: 2 INJECTION, POWDER, FOR SOLUTION INTRAVENOUS at 01:08

## 2019-08-20 RX ADMIN — HYDROMORPHONE HYDROCHLORIDE 1 MG: 2 INJECTION INTRAMUSCULAR; INTRAVENOUS; SUBCUTANEOUS at 10:08

## 2019-08-20 NOTE — PROGRESS NOTES
Neuroendocrine Surgery  Progress Note    Hospital course: 43 y.o. woman transferred from OSH with abdominal pain and sepsis (GNR). 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  Hospital stay complicated by bilateral pulmonary embolisms. Patient reports having no issues with gastric bypass until episode of abdominal pain and resulting surgery last month. She now reports having a few days of abdominal pain with associated nausea/vomiting, fever/chills. Patient has been on TPN at home since discharge from hospital last month via PICC line. RUE PICC line removed yesterday due to suspected sepsis and new PICC placed to [facility]. TPN initiated on HD 2. Blood cultures grew multiresistant E coli, antibiotic therapy changed from zosyn to cefepime.     S:  VSS  Continues to have nausea despite anti-emetic therapy. Patient with vomiting overnight and this morning  Not tolerating PO intake , tolerating TPN infusion  No f/c, cp/sob  +gas/stool to ostomy  Patient ambulating/voiding without issue    O:  Temp:  [97.6 °F (36.4 °C)-98.7 °F (37.1 °C)] 98.2 °F (36.8 °C)  Pulse:  [75-92] 89  Resp:  [16-18] 18  SpO2:  [97 %-99 %] 97 %  BP: (109-137)/(67-83) 109/67    Physical Exam:  Gen: no acute distress. Alert and oriented x3.  HEENT: normocephalic and atraumatic. EOMI.   Resp: unlabored respirations  CV: regular rate  Abd: soft, jejunostomy to LUQ with liquid stool and gas to bag. Denies abdominal pain, non-distended  Ext: warm and well perfused  MSK: normal range of motion, normal strength  Neuro: no focal deficits, normal sensation    I/O:  Intake/Output - Last 3 Shifts       08/18 0700 - 08/19 0659 08/19 0700 - 08/20 0659 08/20 0700 - 08/21 0659    P.O. 837 450     I.V. (mL/kg) 59.8 (0.7) 50 (0.6)     IV Piggyback 150       644     Total Intake(mL/kg)  1646.8 (20.5) 1144 (14.2)     Urine (mL/kg/hr) 2100 (1.1) 2150 (1.1)     Emesis/NG output 300 300     Other       Stool 450 950     Total Output 2850 3400     Net -1203.3 -2256            Urine Occurrence  6 x         CBC:   Recent Labs     08/18/19  0522 08/19/19  0606 08/20/19  0530   WBC 7.89 7.13 11.03   HGB 10.0* 9.8* 9.9*   HCT 34.0* 33.7* 34.0*   * 469* 495*     CMP:  Recent Labs     08/18/19  0522 08/19/19  0606 08/20/19  0530    137 136   K 4.6 4.7 4.4    103 102   CO2 28 28 27   GLU 97 107 109   BUN 17 16 16   CREATININE 0.7 0.7 0.7   CALCIUM 8.9 9.0 9.0   BILITOT 0.2 0.2 0.2   ALKPHOS 142* 141* 152*   AST 29 24 23   ALT 36 37 32   ANIONGAP 6* 6* 7*     Recent Labs     08/18/19  0522 08/19/19  0606 08/20/19  0530   MG 1.9 1.9 1.7   PHOS 3.1 3.2 3.3     Prealbumin: 26 today    Micro:   Microbiology Results (last 7 days)     Procedure Component Value Units Date/Time    Blood culture [[id number]] Collected:  08/10/19 0936    Order Status:  Completed Specimen:  Blood from Line, PICC Left Brachial Updated:  08/15/19 1812     Blood Culture, Routine No growth after 5 days.    Blood culture [[id number]] Collected:  08/10/19 0936    Order Status:  Completed Specimen:  Blood from Line, PICC Left Brachial Updated:  08/15/19 1812     Blood Culture, Routine No growth after 5 days.        A/P: 44 y.o. female admitted 8/8/2019 with history of eddie en Y gastric bypass 13 years ago, DM, & small bowel volvulus s/p ex lap with extensive small bowel resection and end jejunostomy approx 1 month PTA     NPO, place NGT this morning. Patient presenting now like SBO which could be due to stricturing of the jejunostomy.   Continue TPN and Lipids. Patient with approx 15 cm jejunum distal to LOT therefore ability for nutritional absorption severely decreased. Enterade added   Continue bowel regimen with loperamide. Tincture of opium restarted at 3 mg.    Strict documentation of I/Os   Pain/nausea control  PRN   Electrolyte replacement PRN   Continue cefepime, end date of 8/24 per ID   Surgical intervention scheduled for tomorrow 8/21/19    Dispo: Pending surgery    Myles De Los Santos MD   08/20/2019

## 2019-08-20 NOTE — PLAN OF CARE
Problem: Fall Injury Risk  Goal: Absence of Fall and Fall-Related Injury  Outcome: Ongoing (interventions implemented as appropriate)  Pt continues to rest in bed with eyes closed. Respirations even full and unlabored. Easily aroused to verbal  stiumuli. AAOx's4. Safety precautions enforced at all times. Pt reminded to call for assistance as needed prior   to getting OOB. Verbalized understanding.  at bedside at all times. CAll bell within reach. Will continue to monitor.

## 2019-08-20 NOTE — PLAN OF CARE
Attempted to perform Virtual round, pt's  reports pt resting and asks that she not be disturbed at this time.  Will be available to intervene if needed.

## 2019-08-20 NOTE — NURSING
1250 Pt c/o of pain 9/10 to abdomen and throat. Pt offered PRN pain meds but pt refused stating she needs something stronger for pain. Dr. Spencer made aware. MD states will place orders.

## 2019-08-21 ENCOUNTER — ANESTHESIA (OUTPATIENT)
Dept: SURGERY | Facility: HOSPITAL | Age: 44
DRG: 264 | End: 2019-08-21
Payer: COMMERCIAL

## 2019-08-21 LAB
ALBUMIN SERPL BCP-MCNC: 2.6 G/DL (ref 3.5–5.2)
ALP SERPL-CCNC: 146 U/L (ref 55–135)
ALT SERPL W/O P-5'-P-CCNC: 26 U/L (ref 10–44)
ANION GAP SERPL CALC-SCNC: 4 MMOL/L (ref 8–16)
ANION GAP SERPL CALC-SCNC: 9 MMOL/L (ref 8–16)
AST SERPL-CCNC: 18 U/L (ref 10–40)
BASOPHILS # BLD AUTO: 0.01 K/UL (ref 0–0.2)
BASOPHILS # BLD AUTO: 0.05 K/UL (ref 0–0.2)
BASOPHILS NFR BLD: 0.1 % (ref 0–1.9)
BASOPHILS NFR BLD: 0.6 % (ref 0–1.9)
BILIRUB SERPL-MCNC: 0.2 MG/DL (ref 0.1–1)
BUN SERPL-MCNC: 17 MG/DL (ref 6–20)
BUN SERPL-MCNC: 21 MG/DL (ref 6–20)
CALCIUM SERPL-MCNC: 7.9 MG/DL (ref 8.7–10.5)
CALCIUM SERPL-MCNC: 8.7 MG/DL (ref 8.7–10.5)
CHLORIDE SERPL-SCNC: 102 MMOL/L (ref 95–110)
CHLORIDE SERPL-SCNC: 109 MMOL/L (ref 95–110)
CO2 SERPL-SCNC: 22 MMOL/L (ref 23–29)
CO2 SERPL-SCNC: 30 MMOL/L (ref 23–29)
CREAT SERPL-MCNC: 0.6 MG/DL (ref 0.5–1.4)
CREAT SERPL-MCNC: 0.8 MG/DL (ref 0.5–1.4)
DIFFERENTIAL METHOD: ABNORMAL
DIFFERENTIAL METHOD: ABNORMAL
EOSINOPHIL # BLD AUTO: 0 K/UL (ref 0–0.5)
EOSINOPHIL # BLD AUTO: 0.2 K/UL (ref 0–0.5)
EOSINOPHIL NFR BLD: 0 % (ref 0–8)
EOSINOPHIL NFR BLD: 2.9 % (ref 0–8)
ERYTHROCYTE [DISTWIDTH] IN BLOOD BY AUTOMATED COUNT: 18.6 % (ref 11.5–14.5)
ERYTHROCYTE [DISTWIDTH] IN BLOOD BY AUTOMATED COUNT: 18.6 % (ref 11.5–14.5)
EST. GFR  (AFRICAN AMERICAN): >60 ML/MIN/1.73 M^2
EST. GFR  (AFRICAN AMERICAN): >60 ML/MIN/1.73 M^2
EST. GFR  (NON AFRICAN AMERICAN): >60 ML/MIN/1.73 M^2
EST. GFR  (NON AFRICAN AMERICAN): >60 ML/MIN/1.73 M^2
GLUCOSE SERPL-MCNC: 104 MG/DL (ref 70–110)
GLUCOSE SERPL-MCNC: 113 MG/DL (ref 70–110)
HCT VFR BLD AUTO: 28.6 % (ref 37–48.5)
HCT VFR BLD AUTO: 32.6 % (ref 37–48.5)
HGB BLD-MCNC: 8.5 G/DL (ref 12–16)
HGB BLD-MCNC: 9.5 G/DL (ref 12–16)
LYMPHOCYTES # BLD AUTO: 0.5 K/UL (ref 1–4.8)
LYMPHOCYTES # BLD AUTO: 1.7 K/UL (ref 1–4.8)
LYMPHOCYTES NFR BLD: 21 % (ref 18–48)
LYMPHOCYTES NFR BLD: 6.2 % (ref 18–48)
MAGNESIUM SERPL-MCNC: 1.9 MG/DL (ref 1.6–2.6)
MCH RBC QN AUTO: 23.5 PG (ref 27–31)
MCH RBC QN AUTO: 23.7 PG (ref 27–31)
MCHC RBC AUTO-ENTMCNC: 29.1 G/DL (ref 32–36)
MCHC RBC AUTO-ENTMCNC: 29.7 G/DL (ref 32–36)
MCV RBC AUTO: 80 FL (ref 82–98)
MCV RBC AUTO: 81 FL (ref 82–98)
MONOCYTES # BLD AUTO: 0.8 K/UL (ref 0.3–1)
MONOCYTES # BLD AUTO: 0.9 K/UL (ref 0.3–1)
MONOCYTES NFR BLD: 11.9 % (ref 4–15)
MONOCYTES NFR BLD: 9.4 % (ref 4–15)
NEUTROPHILS # BLD AUTO: 5 K/UL (ref 1.8–7.7)
NEUTROPHILS # BLD AUTO: 6.7 K/UL (ref 1.8–7.7)
NEUTROPHILS NFR BLD: 63.6 % (ref 38–73)
NEUTROPHILS NFR BLD: 84.3 % (ref 38–73)
PHOSPHATE SERPL-MCNC: 3.5 MG/DL (ref 2.7–4.5)
PLATELET # BLD AUTO: 415 K/UL (ref 150–350)
PLATELET # BLD AUTO: 430 K/UL (ref 150–350)
PMV BLD AUTO: 9 FL (ref 9.2–12.9)
PMV BLD AUTO: 9.1 FL (ref 9.2–12.9)
POTASSIUM SERPL-SCNC: 4 MMOL/L (ref 3.5–5.1)
POTASSIUM SERPL-SCNC: 4.4 MMOL/L (ref 3.5–5.1)
PROT SERPL-MCNC: 6.2 G/DL (ref 6–8.4)
RBC # BLD AUTO: 3.59 M/UL (ref 4–5.4)
RBC # BLD AUTO: 4.05 M/UL (ref 4–5.4)
SELENIUM SERPL-MCNC: 91 UG/L (ref 23–190)
SODIUM SERPL-SCNC: 136 MMOL/L (ref 136–145)
SODIUM SERPL-SCNC: 140 MMOL/L (ref 136–145)
WBC # BLD AUTO: 7.9 K/UL (ref 3.9–12.7)
WBC # BLD AUTO: 7.95 K/UL (ref 3.9–12.7)

## 2019-08-21 PROCEDURE — C1729 CATH, DRAINAGE: HCPCS | Performed by: SURGERY

## 2019-08-21 PROCEDURE — 88305 TISSUE SPECIMEN TO PATHOLOGY - SURGERY: ICD-10-PCS | Mod: 26,,, | Performed by: PATHOLOGY

## 2019-08-21 PROCEDURE — A4217 STERILE WATER/SALINE, 500 ML: HCPCS | Performed by: SURGERY

## 2019-08-21 PROCEDURE — 85025 COMPLETE CBC W/AUTO DIFF WBC: CPT

## 2019-08-21 PROCEDURE — 63600175 PHARM REV CODE 636 W HCPCS: Performed by: STUDENT IN AN ORGANIZED HEALTH CARE EDUCATION/TRAINING PROGRAM

## 2019-08-21 PROCEDURE — A4216 STERILE WATER/SALINE, 10 ML: HCPCS | Performed by: SURGERY

## 2019-08-21 PROCEDURE — 36000709 HC OR TIME LEV III EA ADD 15 MIN: Performed by: SURGERY

## 2019-08-21 PROCEDURE — 88305 TISSUE EXAM BY PATHOLOGIST: CPT | Mod: 26,,, | Performed by: PATHOLOGY

## 2019-08-21 PROCEDURE — 25000003 PHARM REV CODE 250: Performed by: STUDENT IN AN ORGANIZED HEALTH CARE EDUCATION/TRAINING PROGRAM

## 2019-08-21 PROCEDURE — 25000003 PHARM REV CODE 250: Performed by: SURGERY

## 2019-08-21 PROCEDURE — 84100 ASSAY OF PHOSPHORUS: CPT

## 2019-08-21 PROCEDURE — S0171 BUMETANIDE 0.5 MG: HCPCS | Performed by: NURSE ANESTHETIST, CERTIFIED REGISTERED

## 2019-08-21 PROCEDURE — 83735 ASSAY OF MAGNESIUM: CPT

## 2019-08-21 PROCEDURE — 37000009 HC ANESTHESIA EA ADD 15 MINS: Performed by: SURGERY

## 2019-08-21 PROCEDURE — 63600175 PHARM REV CODE 636 W HCPCS: Performed by: SURGERY

## 2019-08-21 PROCEDURE — 88307 TISSUE EXAM BY PATHOLOGIST: CPT | Performed by: PATHOLOGY

## 2019-08-21 PROCEDURE — S0028 INJECTION, FAMOTIDINE, 20 MG: HCPCS | Performed by: SURGERY

## 2019-08-21 PROCEDURE — 71000033 HC RECOVERY, INTIAL HOUR: Performed by: SURGERY

## 2019-08-21 PROCEDURE — 80048 BASIC METABOLIC PNL TOTAL CA: CPT

## 2019-08-21 PROCEDURE — 88307 TISSUE EXAM BY PATHOLOGIST: CPT | Mod: 26,,, | Performed by: PATHOLOGY

## 2019-08-21 PROCEDURE — 88313 SPECIAL STAINS GROUP 2: CPT | Mod: 26,,, | Performed by: PATHOLOGY

## 2019-08-21 PROCEDURE — 88305 TISSUE EXAM BY PATHOLOGIST: CPT | Performed by: PATHOLOGY

## 2019-08-21 PROCEDURE — 88307 TISSUE SPECIMEN TO PATHOLOGY - SURGERY: ICD-10-PCS | Mod: 26,,, | Performed by: PATHOLOGY

## 2019-08-21 PROCEDURE — 36415 COLL VENOUS BLD VENIPUNCTURE: CPT

## 2019-08-21 PROCEDURE — P9045 ALBUMIN (HUMAN), 5%, 250 ML: HCPCS | Mod: JG | Performed by: NURSE ANESTHETIST, CERTIFIED REGISTERED

## 2019-08-21 PROCEDURE — 25000003 PHARM REV CODE 250: Performed by: NURSE ANESTHETIST, CERTIFIED REGISTERED

## 2019-08-21 PROCEDURE — C9290 INJ, BUPIVACAINE LIPOSOME: HCPCS | Performed by: SURGERY

## 2019-08-21 PROCEDURE — 88304 TISSUE SPECIMEN TO PATHOLOGY - SURGERY: ICD-10-PCS | Mod: 26,,, | Performed by: PATHOLOGY

## 2019-08-21 PROCEDURE — 63600175 PHARM REV CODE 636 W HCPCS: Performed by: NURSE ANESTHETIST, CERTIFIED REGISTERED

## 2019-08-21 PROCEDURE — B4185 PARENTERAL SOL 10 GM LIPIDS: HCPCS | Performed by: SURGERY

## 2019-08-21 PROCEDURE — 11000001 HC ACUTE MED/SURG PRIVATE ROOM

## 2019-08-21 PROCEDURE — 37000008 HC ANESTHESIA 1ST 15 MINUTES: Performed by: SURGERY

## 2019-08-21 PROCEDURE — 88313 TISSUE SPECIMEN TO PATHOLOGY - SURGERY: ICD-10-PCS | Mod: 26,,, | Performed by: PATHOLOGY

## 2019-08-21 PROCEDURE — 80053 COMPREHEN METABOLIC PANEL: CPT

## 2019-08-21 PROCEDURE — 36000708 HC OR TIME LEV III 1ST 15 MIN: Performed by: SURGERY

## 2019-08-21 PROCEDURE — 71000039 HC RECOVERY, EACH ADD'L HOUR: Performed by: SURGERY

## 2019-08-21 PROCEDURE — 88304 TISSUE EXAM BY PATHOLOGIST: CPT | Mod: 26,,, | Performed by: PATHOLOGY

## 2019-08-21 PROCEDURE — 27201423 OPTIME MED/SURG SUP & DEVICES STERILE SUPPLY: Performed by: SURGERY

## 2019-08-21 RX ORDER — NALOXONE HCL 0.4 MG/ML
0.02 VIAL (ML) INJECTION
Status: DISCONTINUED | OUTPATIENT
Start: 2019-08-21 | End: 2019-08-26 | Stop reason: SDUPTHER

## 2019-08-21 RX ORDER — SODIUM CHLORIDE, SODIUM LACTATE, POTASSIUM CHLORIDE, CALCIUM CHLORIDE 600; 310; 30; 20 MG/100ML; MG/100ML; MG/100ML; MG/100ML
INJECTION, SOLUTION INTRAVENOUS CONTINUOUS PRN
Status: DISCONTINUED | OUTPATIENT
Start: 2019-08-21 | End: 2019-08-21

## 2019-08-21 RX ORDER — ROCURONIUM BROMIDE 10 MG/ML
INJECTION, SOLUTION INTRAVENOUS
Status: DISCONTINUED | OUTPATIENT
Start: 2019-08-21 | End: 2019-08-21

## 2019-08-21 RX ORDER — PROPOFOL 10 MG/ML
VIAL (ML) INTRAVENOUS
Status: DISCONTINUED | OUTPATIENT
Start: 2019-08-21 | End: 2019-08-21

## 2019-08-21 RX ORDER — MIDAZOLAM HYDROCHLORIDE 1 MG/ML
INJECTION, SOLUTION INTRAMUSCULAR; INTRAVENOUS
Status: DISCONTINUED | OUTPATIENT
Start: 2019-08-21 | End: 2019-08-21

## 2019-08-21 RX ORDER — KETOROLAC TROMETHAMINE 30 MG/ML
INJECTION, SOLUTION INTRAMUSCULAR; INTRAVENOUS
Status: DISCONTINUED | OUTPATIENT
Start: 2019-08-21 | End: 2019-08-21

## 2019-08-21 RX ORDER — ALBUMIN HUMAN 50 G/1000ML
SOLUTION INTRAVENOUS CONTINUOUS PRN
Status: DISCONTINUED | OUTPATIENT
Start: 2019-08-21 | End: 2019-08-21

## 2019-08-21 RX ORDER — ACETAMINOPHEN 10 MG/ML
1000 INJECTION, SOLUTION INTRAVENOUS EVERY 6 HOURS SCHEDULED
Status: DISPENSED | OUTPATIENT
Start: 2019-08-21 | End: 2019-08-22

## 2019-08-21 RX ORDER — FAMOTIDINE 10 MG/ML
20 INJECTION INTRAVENOUS 2 TIMES DAILY
Status: DISCONTINUED | OUTPATIENT
Start: 2019-08-21 | End: 2019-08-28

## 2019-08-21 RX ORDER — LIDOCAINE HCL/PF 100 MG/5ML
SYRINGE (ML) INTRAVENOUS
Status: DISCONTINUED | OUTPATIENT
Start: 2019-08-21 | End: 2019-08-21

## 2019-08-21 RX ORDER — GLYCOPYRROLATE 0.2 MG/ML
INJECTION INTRAMUSCULAR; INTRAVENOUS
Status: DISCONTINUED | OUTPATIENT
Start: 2019-08-21 | End: 2019-08-21

## 2019-08-21 RX ORDER — NEOSTIGMINE METHYLSULFATE 1 MG/ML
INJECTION, SOLUTION INTRAVENOUS
Status: DISCONTINUED | OUTPATIENT
Start: 2019-08-21 | End: 2019-08-21

## 2019-08-21 RX ORDER — SODIUM CHLORIDE 9 MG/ML
INJECTION, SOLUTION INTRAVENOUS CONTINUOUS PRN
Status: DISCONTINUED | OUTPATIENT
Start: 2019-08-21 | End: 2019-08-21

## 2019-08-21 RX ORDER — SUCCINYLCHOLINE CHLORIDE 20 MG/ML
INJECTION INTRAMUSCULAR; INTRAVENOUS
Status: DISCONTINUED | OUTPATIENT
Start: 2019-08-21 | End: 2019-08-21

## 2019-08-21 RX ORDER — HYDROMORPHONE HCL IN 0.9% NACL 6 MG/30 ML
PATIENT CONTROLLED ANALGESIA SYRINGE INTRAVENOUS CONTINUOUS
Status: DISCONTINUED | OUTPATIENT
Start: 2019-08-21 | End: 2019-08-24

## 2019-08-21 RX ORDER — ONDANSETRON 2 MG/ML
INJECTION INTRAMUSCULAR; INTRAVENOUS
Status: DISCONTINUED | OUTPATIENT
Start: 2019-08-21 | End: 2019-08-21

## 2019-08-21 RX ORDER — FLUCONAZOLE 2 MG/ML
200 INJECTION, SOLUTION INTRAVENOUS
Status: DISCONTINUED | OUTPATIENT
Start: 2019-08-21 | End: 2019-08-28

## 2019-08-21 RX ORDER — VECURONIUM BROMIDE FOR INJECTION 1 MG/ML
INJECTION, POWDER, LYOPHILIZED, FOR SOLUTION INTRAVENOUS
Status: DISCONTINUED | OUTPATIENT
Start: 2019-08-21 | End: 2019-08-21

## 2019-08-21 RX ORDER — FENTANYL CITRATE 50 UG/ML
INJECTION, SOLUTION INTRAMUSCULAR; INTRAVENOUS
Status: DISCONTINUED | OUTPATIENT
Start: 2019-08-21 | End: 2019-08-21

## 2019-08-21 RX ORDER — ACETAMINOPHEN 10 MG/ML
INJECTION, SOLUTION INTRAVENOUS
Status: DISCONTINUED | OUTPATIENT
Start: 2019-08-21 | End: 2019-08-21

## 2019-08-21 RX ORDER — HYDROMORPHONE HYDROCHLORIDE 2 MG/ML
1.5 INJECTION, SOLUTION INTRAMUSCULAR; INTRAVENOUS; SUBCUTANEOUS EVERY 4 HOURS PRN
Status: DISCONTINUED | OUTPATIENT
Start: 2019-08-21 | End: 2019-08-21

## 2019-08-21 RX ORDER — BACITRACIN 50000 [IU]/1
INJECTION, POWDER, FOR SOLUTION INTRAMUSCULAR
Status: DISCONTINUED | OUTPATIENT
Start: 2019-08-21 | End: 2019-08-21 | Stop reason: HOSPADM

## 2019-08-21 RX ORDER — BUMETANIDE 0.25 MG/ML
INJECTION INTRAMUSCULAR; INTRAVENOUS
Status: DISCONTINUED | OUTPATIENT
Start: 2019-08-21 | End: 2019-08-21

## 2019-08-21 RX ORDER — FLUCONAZOLE 2 MG/ML
INJECTION, SOLUTION INTRAVENOUS
Status: DISCONTINUED | OUTPATIENT
Start: 2019-08-21 | End: 2019-08-21

## 2019-08-21 RX ORDER — PHENYLEPHRINE HYDROCHLORIDE 10 MG/ML
INJECTION INTRAVENOUS
Status: DISCONTINUED | OUTPATIENT
Start: 2019-08-21 | End: 2019-08-21

## 2019-08-21 RX ORDER — BUPIVACAINE HYDROCHLORIDE 2.5 MG/ML
INJECTION, SOLUTION EPIDURAL; INFILTRATION; INTRACAUDAL
Status: DISCONTINUED | OUTPATIENT
Start: 2019-08-21 | End: 2019-08-21 | Stop reason: HOSPADM

## 2019-08-21 RX ORDER — PANTOPRAZOLE SODIUM 40 MG/10ML
40 INJECTION, POWDER, LYOPHILIZED, FOR SOLUTION INTRAVENOUS DAILY
Status: DISCONTINUED | OUTPATIENT
Start: 2019-08-22 | End: 2019-08-21

## 2019-08-21 RX ORDER — DEXAMETHASONE SODIUM PHOSPHATE 4 MG/ML
INJECTION, SOLUTION INTRA-ARTICULAR; INTRALESIONAL; INTRAMUSCULAR; INTRAVENOUS; SOFT TISSUE
Status: DISCONTINUED | OUTPATIENT
Start: 2019-08-21 | End: 2019-08-21

## 2019-08-21 RX ADMIN — ROCURONIUM BROMIDE 5 MG: 10 INJECTION, SOLUTION INTRAVENOUS at 09:08

## 2019-08-21 RX ADMIN — ONDANSETRON 8 MG: 2 INJECTION, SOLUTION INTRAMUSCULAR; INTRAVENOUS at 04:08

## 2019-08-21 RX ADMIN — ROCURONIUM BROMIDE 30 MG: 10 INJECTION, SOLUTION INTRAVENOUS at 10:08

## 2019-08-21 RX ADMIN — HYDROMORPHONE HYDROCHLORIDE 1 MG: 2 INJECTION INTRAMUSCULAR; INTRAVENOUS; SUBCUTANEOUS at 12:08

## 2019-08-21 RX ADMIN — DEXTROSE 2 G: 50 INJECTION, SOLUTION INTRAVENOUS at 02:08

## 2019-08-21 RX ADMIN — PHENYLEPHRINE HYDROCHLORIDE 100 MCG: 10 INJECTION INTRAVENOUS at 09:08

## 2019-08-21 RX ADMIN — PHENYLEPHRINE HYDROCHLORIDE 100 MCG: 10 INJECTION INTRAVENOUS at 02:08

## 2019-08-21 RX ADMIN — ALBUMIN (HUMAN): 2.5 SOLUTION INTRAVENOUS at 12:08

## 2019-08-21 RX ADMIN — SODIUM CHLORIDE, SODIUM LACTATE, POTASSIUM CHLORIDE, AND CALCIUM CHLORIDE: .6; .31; .03; .02 INJECTION, SOLUTION INTRAVENOUS at 09:08

## 2019-08-21 RX ADMIN — CEFOXITIN 2 G: 1 INJECTION, POWDER, FOR SOLUTION INTRAVENOUS at 03:08

## 2019-08-21 RX ADMIN — PHENYLEPHRINE HYDROCHLORIDE 100 MCG: 10 INJECTION INTRAVENOUS at 04:08

## 2019-08-21 RX ADMIN — VECURONIUM BROMIDE FOR INJECTION 2 MG: 1 INJECTION, POWDER, LYOPHILIZED, FOR SOLUTION INTRAVENOUS at 12:08

## 2019-08-21 RX ADMIN — FENTANYL CITRATE 50 MCG: 50 INJECTION, SOLUTION INTRAMUSCULAR; INTRAVENOUS at 10:08

## 2019-08-21 RX ADMIN — GLYCOPYRROLATE 0.4 MG: 0.2 INJECTION, SOLUTION INTRAMUSCULAR; INTRAVENOUS at 04:08

## 2019-08-21 RX ADMIN — NEOSTIGMINE METHYLSULFATE 4 MG: 1 INJECTION INTRAVENOUS at 04:08

## 2019-08-21 RX ADMIN — SODIUM CHLORIDE: 0.9 INJECTION, SOLUTION INTRAVENOUS at 03:08

## 2019-08-21 RX ADMIN — CEFOXITIN 2 G: 1 INJECTION, POWDER, FOR SOLUTION INTRAVENOUS at 01:08

## 2019-08-21 RX ADMIN — BUMETANIDE 1 MG: 0.25 INJECTION, SOLUTION INTRAMUSCULAR; INTRAVENOUS at 02:08

## 2019-08-21 RX ADMIN — HYDROMORPHONE HYDROCHLORIDE 1.5 MG: 2 INJECTION, SOLUTION INTRAMUSCULAR; INTRAVENOUS; SUBCUTANEOUS at 04:08

## 2019-08-21 RX ADMIN — ROCURONIUM BROMIDE 20 MG: 10 INJECTION, SOLUTION INTRAVENOUS at 11:08

## 2019-08-21 RX ADMIN — FENTANYL CITRATE 50 MCG: 50 INJECTION, SOLUTION INTRAMUSCULAR; INTRAVENOUS at 04:08

## 2019-08-21 RX ADMIN — VECURONIUM BROMIDE FOR INJECTION 2 MG: 1 INJECTION, POWDER, LYOPHILIZED, FOR SOLUTION INTRAVENOUS at 01:08

## 2019-08-21 RX ADMIN — PHENYLEPHRINE HYDROCHLORIDE 100 MCG: 10 INJECTION INTRAVENOUS at 10:08

## 2019-08-21 RX ADMIN — PHENYLEPHRINE HYDROCHLORIDE 100 MCG: 10 INJECTION INTRAVENOUS at 03:08

## 2019-08-21 RX ADMIN — SUCCINYLCHOLINE CHLORIDE 100 MG: 20 INJECTION, SOLUTION INTRAMUSCULAR; INTRAVENOUS at 09:08

## 2019-08-21 RX ADMIN — FENTANYL CITRATE 100 MCG: 50 INJECTION, SOLUTION INTRAMUSCULAR; INTRAVENOUS at 02:08

## 2019-08-21 RX ADMIN — OCTREOTIDE ACETATE 250 MCG/HR: 1000 INJECTION, SOLUTION INTRAVENOUS; SUBCUTANEOUS at 05:08

## 2019-08-21 RX ADMIN — ROCURONIUM BROMIDE 45 MG: 10 INJECTION, SOLUTION INTRAVENOUS at 09:08

## 2019-08-21 RX ADMIN — VECURONIUM BROMIDE FOR INJECTION 3 MG: 1 INJECTION, POWDER, LYOPHILIZED, FOR SOLUTION INTRAVENOUS at 01:08

## 2019-08-21 RX ADMIN — MICONAZOLE NITRATE: 20 POWDER TOPICAL at 10:08

## 2019-08-21 RX ADMIN — CEFOXITIN 2 G: 1 INJECTION, POWDER, FOR SOLUTION INTRAVENOUS at 11:08

## 2019-08-21 RX ADMIN — VECURONIUM BROMIDE FOR INJECTION 1 MG: 1 INJECTION, POWDER, LYOPHILIZED, FOR SOLUTION INTRAVENOUS at 03:08

## 2019-08-21 RX ADMIN — ALBUMIN (HUMAN): 2.5 SOLUTION INTRAVENOUS at 04:08

## 2019-08-21 RX ADMIN — FLUCONAZOLE 400 MG: 2 INJECTION INTRAVENOUS at 11:08

## 2019-08-21 RX ADMIN — ROCURONIUM BROMIDE 30 MG: 10 INJECTION, SOLUTION INTRAVENOUS at 11:08

## 2019-08-21 RX ADMIN — LIDOCAINE HYDROCHLORIDE 100 MG: 20 INJECTION, SOLUTION INTRAVENOUS at 09:08

## 2019-08-21 RX ADMIN — FENTANYL CITRATE 100 MCG: 50 INJECTION, SOLUTION INTRAMUSCULAR; INTRAVENOUS at 11:08

## 2019-08-21 RX ADMIN — FAMOTIDINE 20 MG: 10 INJECTION, SOLUTION INTRAVENOUS at 10:08

## 2019-08-21 RX ADMIN — SODIUM CHLORIDE: 234 INJECTION INTRAMUSCULAR; INTRAVENOUS; SUBCUTANEOUS at 07:08

## 2019-08-21 RX ADMIN — DEXTROSE 2 G: 50 INJECTION, SOLUTION INTRAVENOUS at 09:08

## 2019-08-21 RX ADMIN — ROCURONIUM BROMIDE 20 MG: 10 INJECTION, SOLUTION INTRAVENOUS at 10:08

## 2019-08-21 RX ADMIN — ACETAMINOPHEN 1000 MG: 10 INJECTION, SOLUTION INTRAVENOUS at 11:08

## 2019-08-21 RX ADMIN — Medication: at 05:08

## 2019-08-21 RX ADMIN — DEXAMETHASONE SODIUM PHOSPHATE 8 MG: 4 INJECTION, SOLUTION INTRAMUSCULAR; INTRAVENOUS at 11:08

## 2019-08-21 RX ADMIN — PROPOFOL 150 MG: 10 INJECTION, EMULSION INTRAVENOUS at 09:08

## 2019-08-21 RX ADMIN — FENTANYL CITRATE 50 MCG: 50 INJECTION, SOLUTION INTRAMUSCULAR; INTRAVENOUS at 01:08

## 2019-08-21 RX ADMIN — ALBUMIN (HUMAN): 2.5 SOLUTION INTRAVENOUS at 01:08

## 2019-08-21 RX ADMIN — Medication 10 ML: at 11:08

## 2019-08-21 RX ADMIN — ALBUMIN (HUMAN): 2.5 SOLUTION INTRAVENOUS at 03:08

## 2019-08-21 RX ADMIN — KETOROLAC TROMETHAMINE 30 MG: 30 INJECTION, SOLUTION INTRAMUSCULAR; INTRAVENOUS at 03:08

## 2019-08-21 RX ADMIN — CEFEPIME 2 G: 2 INJECTION, POWDER, FOR SOLUTION INTRAVENOUS at 04:08

## 2019-08-21 RX ADMIN — OXYCODONE HYDROCHLORIDE 10 MG: 5 TABLET ORAL at 02:08

## 2019-08-21 RX ADMIN — I.V. FAT EMULSION 250 ML: 20 EMULSION INTRAVENOUS at 10:08

## 2019-08-21 RX ADMIN — MIDAZOLAM 2 MG: 1 INJECTION INTRAMUSCULAR; INTRAVENOUS at 09:08

## 2019-08-21 RX ADMIN — PROMETHAZINE HYDROCHLORIDE 25 MG: 25 INJECTION INTRAMUSCULAR; INTRAVENOUS at 02:08

## 2019-08-21 RX ADMIN — FENTANYL CITRATE 100 MCG: 50 INJECTION, SOLUTION INTRAMUSCULAR; INTRAVENOUS at 09:08

## 2019-08-21 RX ADMIN — ACETAMINOPHEN 1000 MG: 10 INJECTION, SOLUTION INTRAVENOUS at 12:08

## 2019-08-21 RX ADMIN — CEFEPIME 2 G: 2 INJECTION, POWDER, FOR SOLUTION INTRAVENOUS at 10:08

## 2019-08-21 RX ADMIN — ALBUMIN (HUMAN): 2.5 SOLUTION INTRAVENOUS at 11:08

## 2019-08-21 NOTE — OP NOTE
[facility]  Surgery Department  Operative Note    SUMMARY     Date of Procedure: 8/21/2019     Procedure:   1. Ex lap  2. Extensive lysis of adhesions  3. Takedown ileostomy  4. Esophagogastrostomy with 29mm EEA with amniofix,  5. Gastrostomy closure  6.Jejunostomy closure stapled  7. jejuno-jejunostomy stapled and handsewn with amniofix  8. Jejuno- ileostomy handsewn with amniofix  9. Appendectomy  10. Liver biopsy  11.20 Fr malecot G tube with brad gastrostomy  12. ARIANA block  13. Novadaq exam of small bowel    Surgeon(s) and Role:     * Kirit Unger MD - Primary    Assisting Surgeon: Danielle De Los Santos    Pre-Operative Diagnosis: SGS (short gut syndrome) [K91.2]    Post-Operative Diagnosis: Post-Op Diagnosis Codes:   1. Extensive adhesions  2. Small gastric pouch <10cc pouch  3. Vagus taken down  4. Gastric atony with stasis  5. Twisted Kendra y loop to 270 degrees  6. Chronically Large dilated jejunum with stasis all over  7. Total Kendra length of 85 cms  8. Biliopancreatic limb of 20 cms  9. Jejunal anastamosis to ileostomy 20cms  10. Terminal ileum of 5 cms attached to cecum with intact ileocecal valve  11. Final small bowel length of 125 cms  12. Chronic appendicitis  13. Normal liver  14. Bilateral normal tubes and ovaries  15. Enlarged uterus with fibroid        Anesthesia: General      [id number]      Complications: none  Estimated Blood Loss (EBL): 100cc  Implants: G tube  amniofix    Specimens:   Specimen (12h ago, onward)    Start     Ordered    08/21/19 1614  Specimen to Pathology - Surgery  Once     Comments:  Pre-op Diagnosis: SGS (short gut syndrome) [K91.2]Procedure(s):GASTRECTOMY, PARTIALLAPAROTOMY, EXPLORATORY Number of specimens: 5Name of specimens: 1. Appendix- Perm2. Proximal jejunum- Perm3. Distal ileum- Perm4. Liver biopsy- Perm5. Ileostomy - perm     Start Status     08/21/19 1614 Collected (08/21/19 1613) Order ID: [id number]       08/21/19 1613    08/21/19 1611   Specimen to Pathology - Surgery  Once,   Status:  Canceled     Comments:  Pre-op Diagnosis: SGS (short gut syndrome) [K91.2]Procedure(s):GASTRECTOMY, PARTIALLAPAROTOMY, EXPLORATORY Number of specimens: 5Name of specimens: 1. Appendix- Perm2. Proximal jejunum- Perm3. Distal ileum- Perm4. Liver biopsy- Perm5. Ileostomy - perm     Start Status     08/21/19 1611 Canceled Order ID: [id number]       08/21/19 1611 08/21/19 1436  Specimen to Pathology - Surgery  Once,   Status:  Canceled     Comments:  Pre-op Diagnosis: SGS (short gut syndrome) [K91.2]Procedure(s):GASTRECTOMY, PARTIALLAPAROTOMY, EXPLORATORY Number of specimens: Name of specimens: 1. Appendix- Perm2. Proximal jejunum- Perm3. Distal ileum- Perm4. Liver biopsy- Perm     Start Status     08/21/19 1436 Canceled Order ID: [id number]       08/21/19 1435                  Condition: Stable    Disposition: PACU - hemodynamically stable.    Attestation: I was present and scrubbed for the entire procedure.

## 2019-08-21 NOTE — PLAN OF CARE
Problem: Adult Inpatient Plan of Care  Goal: Plan of Care Review  Outcome: Ongoing (interventions implemented as appropriate)  Pt AAOx4. Pt complaints of abdominal pain with mild relief from PRN pain meds. Pt with continuous nausea with moderate relief from PRN phenergan. Pt NPO for ex lap today. TPN infusing at 50 ml/hr. Kerry infusing at 5 ml/hr. IV ABX given per MD orders. NG tube remains intact with thick yellow drainage. Bed locked in lowest position, bed alarm set and call bell within reach. Will continue to monitor.

## 2019-08-21 NOTE — TRANSFER OF CARE
"Anesthesia Transfer of Care Note    Patient: Parvin Corbin    Procedure(s) Performed: Procedure(s) (LRB):  GASTRECTOMY, PARTIAL exploratory laparotomy, esophagogastrostomy, jejunostomy, jejunoileostomy, appenedctomy, brad gastrotstomy, liver biopsy (N/A)  LAPAROTOMY, EXPLORATORY (N/A)    Patient location: PACU    Anesthesia Type: general    Transport from OR: Transported from OR on 6-10 L/min O2 by face mask with adequate spontaneous ventilation    Post pain: adequate analgesia    Post assessment: no apparent anesthetic complications    Post vital signs: stable    Level of consciousness: awake    Nausea/Vomiting: no nausea/vomiting    Complications: none    Transfer of care protocol was followed      Last vitals:   Visit Vitals  /83   Pulse 90   Temp 36.4 °C (97.6 °F) (Skin)   Resp 16   Ht 5' 5" (1.651 m)   Wt 80.4 kg (177 lb 4 oz)   SpO2 100%   Breastfeeding? No   BMI 29.50 kg/m²     "

## 2019-08-21 NOTE — ANESTHESIA PREPROCEDURE EVALUATION
08/20/2019  Parvin Corbin is a 44 y.o., female with history DMII, kendra en Y gastric bypass surgery and small bowel volvulus s/p ex lap with extensive small bowel resection and end jejunostomy approx 1 month ago now scheduled for ex-lap for bowel obstruction       Anesthesia Evaluation    I have reviewed the Patient Summary Reports.        Review of Systems  Anesthesia Hx:  Denies Hx of Anesthetic complications   Denies Personal Hx of Anesthesia complications.   Hematology/Oncology:  Hematology Normal   Oncology Normal     EENT/Dental:EENT/Dental Normal   Cardiovascular:  Cardiovascular Normal Exercise tolerance: good     Pulmonary:   Hx Bilateral PE during hospital stay 1 month ago on anticoagulation   Renal/:  Renal/ Normal     Hepatic/GI:   Kendra en Y gastric bypass 13 years ago & small bowel volvulus s/p ex lap with extensive small bowel resection and end jejunostomy approx 1 month PTA      Musculoskeletal:  Musculoskeletal Normal    Neurological:  Neurology Normal    Endocrine:   Diabetes, type 2    Psych:  Psychiatric Normal           Physical Exam  General:  Well nourished    Airway/Jaw/Neck:  Airway Findings: Mouth Opening: Normal Tongue: Normal  General Airway Assessment: Adult  Mallampati: II  TM Distance: Normal, at least 6 cm  Jaw/Neck Findings:  Neck ROM: Normal ROM      Dental:  Dental Findings: In tact   Chest/Lungs:  Chest/Lungs Findings: Normal Respiratory Rate     Heart/Vascular:  Heart Findings: Rate: Normal  Rhythm: Regular Rhythm     Abdomen:  Abdomen Findings:  Normal       Mental Status:  Mental Status Findings:  Cooperative, Alert and Oriented         Anesthesia Plan  Type of Anesthesia, risks & benefits discussed:  Anesthesia Type:  general  Patient's Preference:   Intra-op Monitoring Plan: standard ASA monitors and arterial line  Intra-op Monitoring Plan Comments:   Post Op Pain  Control Plan: per primary service following discharge from PACU and multimodal analgesia  Post Op Pain Control Plan Comments:   Induction:   IV  Beta Blocker:  Patient is not currently on a Beta-Blocker (No further documentation required).       Informed Consent: Patient understands risks and agrees with Anesthesia plan.  Questions answered. Anesthesia consent signed with patient.  ASA Score: 3     Day of Surgery Review of History & Physical:            Ready For Surgery From Anesthesia Perspective.

## 2019-08-21 NOTE — PLAN OF CARE
Patient has met PACU discharge criteria, VSS, pain well controlled. Family updated by phone. Released from PACU by Dr. Gauthier*

## 2019-08-21 NOTE — INTERVAL H&P NOTE
The patient has been examined and the H&P has been reviewed:    I concur with the findings and changes have been noted since the H&P was written: as documented in progress notes    Anesthesia/Surgery risks, benefits and alternative options discussed and understood by patient/family.    Active Hospital Problems    Diagnosis  POA    *Sepsis [A41.9]  Yes    Complications of gastric bypass surgery [K91.89, Y83.2]  Yes    Bacteremia due to Escherichia coli [R78.81]  Yes    Protein calorie malnutrition [E46]  Yes    SGS (short gut syndrome) [K91.2]  Yes      Resolved Hospital Problems   No resolved problems to display.     This procedure has been fully reviewed with the patient and written informed consent has been obtained.     Proceed to OR for exploratory laparotomy, eddie en y gastric bypass reversal, jejunostomy reversal, reconnection of GI tract continuity and all other indicated procedures.    Myles De Los Santos MD   [facility] General Surgery, PGY-2  08/21/2019

## 2019-08-22 PROBLEM — T81.31XA OPEN DRAINING ABDOMINAL INCISION: Status: ACTIVE | Noted: 2019-08-22

## 2019-08-22 LAB
ALBUMIN SERPL BCP-MCNC: 3 G/DL (ref 3.5–5.2)
ALP SERPL-CCNC: 89 U/L (ref 55–135)
ALT SERPL W/O P-5'-P-CCNC: 38 U/L (ref 10–44)
ANION GAP SERPL CALC-SCNC: 8 MMOL/L (ref 8–16)
ANISOCYTOSIS BLD QL SMEAR: SLIGHT
AST SERPL-CCNC: 34 U/L (ref 10–40)
BASOPHILS # BLD AUTO: ABNORMAL K/UL (ref 0–0.2)
BASOPHILS NFR BLD: 0 % (ref 0–1.9)
BILIRUB SERPL-MCNC: 0.3 MG/DL (ref 0.1–1)
BUN SERPL-MCNC: 31 MG/DL (ref 6–20)
CALCIUM SERPL-MCNC: 7.9 MG/DL (ref 8.7–10.5)
CHLORIDE SERPL-SCNC: 106 MMOL/L (ref 95–110)
CO2 SERPL-SCNC: 21 MMOL/L (ref 23–29)
CREAT SERPL-MCNC: 0.9 MG/DL (ref 0.5–1.4)
DIFFERENTIAL METHOD: ABNORMAL
EOSINOPHIL # BLD AUTO: ABNORMAL K/UL (ref 0–0.5)
EOSINOPHIL NFR BLD: 0 % (ref 0–8)
ERYTHROCYTE [DISTWIDTH] IN BLOOD BY AUTOMATED COUNT: 18.8 % (ref 11.5–14.5)
EST. GFR  (AFRICAN AMERICAN): >60 ML/MIN/1.73 M^2
EST. GFR  (NON AFRICAN AMERICAN): >60 ML/MIN/1.73 M^2
GLUCOSE SERPL-MCNC: 248 MG/DL (ref 70–110)
HCT VFR BLD AUTO: 24.4 % (ref 37–48.5)
HGB BLD-MCNC: 7.3 G/DL (ref 12–16)
HYPOCHROMIA BLD QL SMEAR: ABNORMAL
LYMPHOCYTES # BLD AUTO: ABNORMAL K/UL (ref 1–4.8)
LYMPHOCYTES NFR BLD: 1 % (ref 18–48)
MAGNESIUM SERPL-MCNC: 1.6 MG/DL (ref 1.6–2.6)
MCH RBC QN AUTO: 23.9 PG (ref 27–31)
MCHC RBC AUTO-ENTMCNC: 29.9 G/DL (ref 32–36)
MCV RBC AUTO: 80 FL (ref 82–98)
MONOCYTES # BLD AUTO: ABNORMAL K/UL (ref 0.3–1)
MONOCYTES NFR BLD: 4 % (ref 4–15)
NEUTROPHILS NFR BLD: 73 % (ref 38–73)
NEUTS BAND NFR BLD MANUAL: 22 %
OVALOCYTES BLD QL SMEAR: ABNORMAL
PHOSPHATE SERPL-MCNC: 4.1 MG/DL (ref 2.7–4.5)
PLATELET # BLD AUTO: 548 K/UL (ref 150–350)
PLATELET BLD QL SMEAR: ABNORMAL
PMV BLD AUTO: 9.6 FL (ref 9.2–12.9)
POCT GLUCOSE: 137 MG/DL (ref 70–110)
POCT GLUCOSE: 179 MG/DL (ref 70–110)
POIKILOCYTOSIS BLD QL SMEAR: SLIGHT
POLYCHROMASIA BLD QL SMEAR: ABNORMAL
POTASSIUM SERPL-SCNC: 4.2 MMOL/L (ref 3.5–5.1)
PREALB SERPL-MCNC: 14 MG/DL (ref 20–43)
PROT SERPL-MCNC: 5.2 G/DL (ref 6–8.4)
RBC # BLD AUTO: 3.06 M/UL (ref 4–5.4)
SODIUM SERPL-SCNC: 135 MMOL/L (ref 136–145)
TRIGL SERPL-MCNC: 80 MG/DL (ref 30–150)
WBC # BLD AUTO: 20.25 K/UL (ref 3.9–12.7)

## 2019-08-22 PROCEDURE — S0028 INJECTION, FAMOTIDINE, 20 MG: HCPCS | Performed by: SURGERY

## 2019-08-22 PROCEDURE — 85007 BL SMEAR W/DIFF WBC COUNT: CPT

## 2019-08-22 PROCEDURE — 85027 COMPLETE CBC AUTOMATED: CPT

## 2019-08-22 PROCEDURE — 83735 ASSAY OF MAGNESIUM: CPT

## 2019-08-22 PROCEDURE — 36415 COLL VENOUS BLD VENIPUNCTURE: CPT

## 2019-08-22 PROCEDURE — 63600175 PHARM REV CODE 636 W HCPCS: Performed by: SURGERY

## 2019-08-22 PROCEDURE — 25000003 PHARM REV CODE 250: Performed by: SURGERY

## 2019-08-22 PROCEDURE — 25000003 PHARM REV CODE 250: Performed by: STUDENT IN AN ORGANIZED HEALTH CARE EDUCATION/TRAINING PROGRAM

## 2019-08-22 PROCEDURE — 99900035 HC TECH TIME PER 15 MIN (STAT)

## 2019-08-22 PROCEDURE — 94770 HC EXHALED C02 TEST: CPT

## 2019-08-22 PROCEDURE — 63600175 PHARM REV CODE 636 W HCPCS: Performed by: STUDENT IN AN ORGANIZED HEALTH CARE EDUCATION/TRAINING PROGRAM

## 2019-08-22 PROCEDURE — 80053 COMPREHEN METABOLIC PANEL: CPT

## 2019-08-22 PROCEDURE — 84478 ASSAY OF TRIGLYCERIDES: CPT

## 2019-08-22 PROCEDURE — 84134 ASSAY OF PREALBUMIN: CPT

## 2019-08-22 PROCEDURE — 97803 MED NUTRITION INDIV SUBSEQ: CPT

## 2019-08-22 PROCEDURE — 94761 N-INVAS EAR/PLS OXIMETRY MLT: CPT

## 2019-08-22 PROCEDURE — 11000001 HC ACUTE MED/SURG PRIVATE ROOM

## 2019-08-22 PROCEDURE — A4217 STERILE WATER/SALINE, 500 ML: HCPCS | Performed by: SURGERY

## 2019-08-22 PROCEDURE — 27000221 HC OXYGEN, UP TO 24 HOURS

## 2019-08-22 PROCEDURE — 84100 ASSAY OF PHOSPHORUS: CPT

## 2019-08-22 RX ORDER — SODIUM CHLORIDE 9 MG/ML
INJECTION, SOLUTION INTRAVENOUS CONTINUOUS
Status: DISCONTINUED | OUTPATIENT
Start: 2019-08-22 | End: 2019-08-27

## 2019-08-22 RX ORDER — CYANOCOBALAMIN 1000 UG/ML
1000 INJECTION, SOLUTION INTRAMUSCULAR; SUBCUTANEOUS DAILY
Status: DISCONTINUED | OUTPATIENT
Start: 2019-08-22 | End: 2019-09-01 | Stop reason: HOSPADM

## 2019-08-22 RX ORDER — KETOROLAC TROMETHAMINE 30 MG/ML
15 INJECTION, SOLUTION INTRAMUSCULAR; INTRAVENOUS EVERY 6 HOURS
Status: DISCONTINUED | OUTPATIENT
Start: 2019-08-22 | End: 2019-08-23

## 2019-08-22 RX ORDER — ACETAMINOPHEN 10 MG/ML
1000 INJECTION, SOLUTION INTRAVENOUS EVERY 8 HOURS
Status: DISCONTINUED | OUTPATIENT
Start: 2019-08-22 | End: 2019-08-22

## 2019-08-22 RX ORDER — INSULIN ASPART 100 [IU]/ML
0-5 INJECTION, SOLUTION INTRAVENOUS; SUBCUTANEOUS EVERY 6 HOURS PRN
Status: DISCONTINUED | OUTPATIENT
Start: 2019-08-22 | End: 2019-09-01 | Stop reason: HOSPADM

## 2019-08-22 RX ORDER — GABAPENTIN 300 MG/1
300 CAPSULE ORAL 3 TIMES DAILY
Status: DISCONTINUED | OUTPATIENT
Start: 2019-08-22 | End: 2019-08-28

## 2019-08-22 RX ORDER — ACETAMINOPHEN 650 MG/20.3ML
1000 LIQUID ORAL EVERY 8 HOURS
Status: COMPLETED | OUTPATIENT
Start: 2019-08-22 | End: 2019-08-23

## 2019-08-22 RX ORDER — CYCLOBENZAPRINE HCL 5 MG
5 TABLET ORAL 3 TIMES DAILY PRN
Status: DISCONTINUED | OUTPATIENT
Start: 2019-08-22 | End: 2019-08-24

## 2019-08-22 RX ORDER — MAGNESIUM SULFATE HEPTAHYDRATE 40 MG/ML
2 INJECTION, SOLUTION INTRAVENOUS ONCE
Status: COMPLETED | OUTPATIENT
Start: 2019-08-22 | End: 2019-08-22

## 2019-08-22 RX ORDER — GLUCAGON 1 MG
1 KIT INJECTION
Status: DISCONTINUED | OUTPATIENT
Start: 2019-08-22 | End: 2019-09-01 | Stop reason: HOSPADM

## 2019-08-22 RX ORDER — CEFEPIME HYDROCHLORIDE 2 G/50ML
2 INJECTION, SOLUTION INTRAVENOUS
Status: DISCONTINUED | OUTPATIENT
Start: 2019-08-22 | End: 2019-08-26

## 2019-08-22 RX ADMIN — MAGNESIUM SULFATE IN WATER 2 G: 40 INJECTION, SOLUTION INTRAVENOUS at 09:08

## 2019-08-22 RX ADMIN — SODIUM CHLORIDE: 234 INJECTION INTRAMUSCULAR; INTRAVENOUS; SUBCUTANEOUS at 05:08

## 2019-08-22 RX ADMIN — ACETAMINOPHEN 999.01 MG: 650 SOLUTION ORAL at 11:08

## 2019-08-22 RX ADMIN — KETOROLAC TROMETHAMINE 15 MG: 30 INJECTION, SOLUTION INTRAMUSCULAR at 11:08

## 2019-08-22 RX ADMIN — GABAPENTIN 300 MG: 300 CAPSULE ORAL at 09:08

## 2019-08-22 RX ADMIN — FLUCONAZOLE 200 MG: 2 INJECTION, SOLUTION INTRAVENOUS at 02:08

## 2019-08-22 RX ADMIN — SUCRALFATE 1 G: 1 SUSPENSION ORAL at 11:08

## 2019-08-22 RX ADMIN — IRON SUCROSE 100 MG: 20 INJECTION, SOLUTION INTRAVENOUS at 09:08

## 2019-08-22 RX ADMIN — ACETAMINOPHEN 1000 MG: 10 INJECTION, SOLUTION INTRAVENOUS at 06:08

## 2019-08-22 RX ADMIN — KETOROLAC TROMETHAMINE 15 MG: 30 INJECTION, SOLUTION INTRAMUSCULAR at 05:08

## 2019-08-22 RX ADMIN — ACETAMINOPHEN 999.01 MG: 650 SOLUTION ORAL at 02:08

## 2019-08-22 RX ADMIN — Medication: at 03:08

## 2019-08-22 RX ADMIN — CYCLOBENZAPRINE HYDROCHLORIDE 5 MG: 5 TABLET, FILM COATED ORAL at 10:08

## 2019-08-22 RX ADMIN — MICONAZOLE NITRATE: 20 POWDER TOPICAL at 09:08

## 2019-08-22 RX ADMIN — GABAPENTIN 300 MG: 300 CAPSULE ORAL at 11:08

## 2019-08-22 RX ADMIN — FAMOTIDINE 20 MG: 10 INJECTION, SOLUTION INTRAVENOUS at 11:08

## 2019-08-22 RX ADMIN — CEFEPIME HYDROCHLORIDE 2 G: 2 INJECTION, SOLUTION INTRAVENOUS at 10:08

## 2019-08-22 RX ADMIN — Medication: at 05:08

## 2019-08-22 RX ADMIN — GABAPENTIN 300 MG: 300 CAPSULE ORAL at 02:08

## 2019-08-22 RX ADMIN — Medication: at 09:08

## 2019-08-22 RX ADMIN — CYANOCOBALAMIN 1000 MCG: 1000 INJECTION, SOLUTION INTRAMUSCULAR; SUBCUTANEOUS at 09:08

## 2019-08-22 RX ADMIN — CEFEPIME 2 G: 2 INJECTION, POWDER, FOR SOLUTION INTRAVENOUS at 02:08

## 2019-08-22 RX ADMIN — CEFEPIME 2 G: 2 INJECTION, POWDER, FOR SOLUTION INTRAVENOUS at 05:08

## 2019-08-22 RX ADMIN — FAMOTIDINE 20 MG: 10 INJECTION, SOLUTION INTRAVENOUS at 09:08

## 2019-08-22 RX ADMIN — SODIUM CHLORIDE: 0.9 INJECTION, SOLUTION INTRAVENOUS at 09:08

## 2019-08-22 RX ADMIN — Medication: at 11:08

## 2019-08-22 RX ADMIN — CALCIUM GLUCONATE 500 MG: 98 INJECTION, SOLUTION INTRAVENOUS at 03:08

## 2019-08-22 NOTE — PROGRESS NOTES
Notified Dr. Henry via telephone of saturated serosanguinous drainage over pts left abdominal incision,  wound care orders given.   Abdominal wound cleaned with normal saline, wet to dry of NS, 1 piece taken out, 1 piece put in, eliu wound intact and non-red, no odor. Covered with guaze, ABD pad, secured with medipore tape.  Pt tolerated well.

## 2019-08-22 NOTE — PROGRESS NOTES
Notified Dr. De Los Santos the dressing had to be changed a second time and this time she has several small blood clots on the gauze. Patient also in pain and Hr is in the 100's to 119. Orders given for Ofirmev IV  every 6 hours times 3 doses and he changed the PCA to every 8 minutes for a total of 1.5mg /hr. Will continue to monitor.

## 2019-08-22 NOTE — PLAN OF CARE
Problem: Adult Inpatient Plan of Care  Goal: Plan of Care Review  Outcome: Ongoing (interventions implemented as appropriate)  Dressing changed to LUQ 3 times overnight. Patient tolerated well. TPN, Lipids IV antibiotics PCA Dilaudid in progress or given as ordered. Had to call the doctor for c/o pain. NG toub remains to LCWSX , G tube to gravity and zhong to gravity overnight. Remains on Telemetry -119. ALMA hose and SCD's in use. Remains free from falls, bed alarm in use.

## 2019-08-22 NOTE — PROGRESS NOTES
"[facility]  Adult Nutrition  Progress Note    SUMMARY       Recommendations    Recommendation:   1. Initiate Clear Liquid diet when medically acceptable.   2. Continue TPN with IVFE.    Goals:  1. Pt will tolerate TPN.  2. Pt will tolerate po diet with at least 50% intake at meals.  Nutrition Goal Status: progressing towards goal  Communication of RD Recs: reviewed with RN(Skylar)    Reason for Assessment  Reason For Assessment: RD follow-up  Diagnosis: (sepsis)  Relevant Medical History: DM, anxiety, ADD, gastric bypass, expl lap, cholecystectomy  General Information Comments: Pt NPO at this time. s/p partial gastrectomy & expl lap yesterday . Receiving custom TPN at 50ml/hr with daily IVFE. NFPE completed 8/10-,ild wastin go temples/clavicles.  Nutrition Discharge Planning: d/c diet to be determined    Nutrition Risk Screen  Nutrition Risk Screen: tube feeding or parenteral nutrition    Nutrition/Diet History  Food Preferences: no Protestant or cultural food prefs identified  Spiritual, Cultural Beliefs, Congregation Practices, Values that Affect Care: no  Factors Affecting Nutritional Intake: altered gastrointestinal function    Anthropometrics  Temp: 98.2 °F (36.8 °C)  Height Method: Stated  Height: 5' 5" (165.1 cm)  Height (inches): 65 in  Weight Method: Bed Scale  Weight: 83.3 kg (183 lb 10.3 oz)  Weight (lb): 183.65 lb  Ideal Body Weight (IBW), Female: 125 lb  % Ideal Body Weight, Female (lb): 134.74 lb  BMI (Calculated): 28.1  BMI Grade: 25 - 29.9 - overweight  Usual Body Weight (UBW), k.8 kg  % Usual Body Weight: 93.59  % Weight Change From Usual Weight: -6.6 %     Lab/Procedures/Meds  Pertinent Labs Reviewed: reviewed  Pertinent Labs Comments: Na 1354L, BUN 31H, Glu 248H, Ca 7.9L, Alb 3.0L, PAB 14L  Pertinent Medications Reviewed: reviewed  Pertinent Medications Comments: diflucan, tylenol    Estimated/Assessed Needs  Weight Used For Calorie Calculations: 83.3 kg (183 lb 10.3 " oz)  Energy Calorie Requirements (kcal): 2069  Energy Need Method: Gates-St Jeor(x1.4)  Protein Requirements: 83g (1.0g/kg)  Weight Used For Protein Calculations: 83.3 kg (183 lb 10.3 oz)  Estimated Fluid Requirement Method: RDA Method  RDA Method (mL): 2069     Nutrition Prescription Ordered  Current Diet Order: NPO  Current Nutrition Support Formula Ordered: (custom TPN)  Current Nutrition Support Rate Ordered: 50 (ml)  Current Nutrition Support Frequency Ordered: ml/hr with IVFE 3xweekly  Oral Nutrition Supplement: NPO    Evaluation of Received Nutrient/Fluid Intake  Parenteral Calories (kcal): 1282  Parenteral Protein (gm): 108  Parenteral Fluid (mL): 1200  Lipid Calories (kcals): 500 kcals  GIR (Glucose Infusion Rate) (mg/kg/min): 2 mg/kg/min  Total Calories (kcal): 1782  % Kcal Needs: 86  % Protein Needs: 130  I/O: 3599/2058  Energy Calories Required: not meeting needs  Protein Required: meeting needs  Fluid Required: meeting needs  Comments: LBM 8/21  % Intake of Estimated Energy Needs: 75 - 100 %  % Meal Intake: NPO    Nutrition Risk  Level of Risk/Frequency of Follow-up: (2xweekly)     Assessment and Plan  Protein calorie malnutrition  Malnutrition in the context of Acute Illness/Injury    Related to (etiology):  S/p surgery/sepsis    Signs and Symptoms (as evidenced by):  Energy Intake: <50% of estimated energy requirement for 1 month  Muscle Mass Depletion: mild depletion of temples, clavicle region and scapular region   Weight Loss: 6% x 1 month     Interventions:  Parenteral nutrition  Commercial beverage    Nutrition Diagnosis Status:  Continues    Monitor and Evaluation  Food and Nutrient Intake: food and beverage intake, parenteral nutrition intake  Food and Nutrient Adminstration: diet order, enteral and parenteral nutrition administration  Physical Activity and Function: nutrition-related ADLs and IADLs  Anthropometric Measurements: weight  Biochemical Data, Medical Tests and Procedures:  electrolyte and renal panel  Nutrition-Focused Physical Findings: overall appearance     Malnutrition Assessment  Malnutrition Type: acute illness or injury  Weight Loss (Malnutrition): greater than 5% in 1 month   Pentecostalism Region (Muscle Loss): mild depletion  Clavicle Bone Region (Muscle Loss): mild depletion   Subcutaneous Fat Loss (Final Summary): well nourished  Muscle Loss Evaluation (Final Summary): mild protein-calorie malnutrition       Nutrition Follow-Up  RD Follow-up?: Yes

## 2019-08-22 NOTE — PROGRESS NOTES
Neuroendocrine Surgery  Progress Note    Hospital course: 43 y.o. woman transferred from OSH with abdominal pain and sepsis (GNR). 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  Hospital stay complicated by bilateral pulmonary embolisms. Patient reports having no issues with gastric bypass until episode of abdominal pain and resulting surgery last month. She now reports having a few days of abdominal pain with associated nausea/vomiting, fever/chills. Patient has been on TPN at home since discharge from hospital last month via PICC line. RUE PICC line removed yesterday due to suspected sepsis and new PICC placed to [facility]. TPN initiated on HD 2. Blood cultures grew multiresistant E coli, antibiotic therapy changed from zosyn to cefepime.     S:  Post-op 1  Patient has increase amount of pain in the abdomen. Dilaudid PCA pump interval and max dose was increased and patient reports no relief from the pain  Patient has     O:  Temp:  [96.8 °F (36 °C)-98.4 °F (36.9 °C)] 98.4 °F (36.9 °C)  Pulse:  [] 116  Resp:  [13-19] 18  SpO2:  [98 %-100 %] 99 %  BP: (108-133)/(65-83) 108/74    Physical Exam:  Gen: Alert and oriented x3. In distress  HEENT: normocephalic and atraumatic. EOMI. NG tube in place  Resp: Shallow breaths  CV: Tachycardiac  Abd: Incision intact. Dressing on the left lower extremity clean dry and intact  Ext: warm and well perfused  MSK: normal range of motion, normal strength  Neuro: no focal deficits, normal sensation    I/O:  Intake/Output - Last 3 Shifts       08/20 0700 - 08/21 0659 08/21 0700 - 08/22 0659 08/22 0700 - 08/23 0659    P.O. 0 0     I.V. (mL/kg) 166.8 (2.1) 2502 (30)     Other  0     NG/GT 60 0     IV Piggyback 250 400     .2 697.4     Total Intake(mL/kg) 1115.9 (13.9) 3599.4 (43.2)     Urine (mL/kg/hr) 1500  (0.8) 2045 (1)     Emesis/NG output       Drains 200 12.5     Stool 200 0     Total Output 1900 2057.5     Net -784.1 +1541.9            Stool Occurrence  0 x         CBC:   Recent Labs     08/21/19  0426 08/21/19  1809 08/22/19  0546   WBC 7.90 7.95 20.25*   HGB 9.5* 8.5* 7.3*   HCT 32.6* 28.6* 24.4*   * 415* 548*     CMP:  Recent Labs     08/21/19  0426 08/21/19  1809 08/22/19  0420    140 135*   K 4.4 4.0 4.2    109 106   CO2 30* 22* 21*   * 104 248*   BUN 17 21* 31*   CREATININE 0.6 0.8 0.9   CALCIUM 8.7 7.9* 7.9*   BILITOT 0.2  --  0.3   ALKPHOS 146*  --  89   AST 18  --  34   ALT 26  --  38   ANIONGAP 4* 9 8     Recent Labs     08/20/19  0530 08/21/19  0426 08/22/19  0420   MG 1.7 1.9 1.6   PHOS 3.3 3.5 4.1       Micro:   Microbiology Results (last 7 days)     Procedure Component Value Units Date/Time    Blood culture [[id number]] Collected:  08/10/19 0936    Order Status:  Completed Specimen:  Blood from Line, PICC Left Brachial Updated:  08/15/19 1812     Blood Culture, Routine No growth after 5 days.    Blood culture [[id number]] Collected:  08/10/19 0936    Order Status:  Completed Specimen:  Blood from Line, PICC Left Brachial Updated:  08/15/19 1812     Blood Culture, Routine No growth after 5 days.        A/P: 44 y.o. female admitted 8/8/2019 with history of eddie en Y gastric bypass 13 years ago, DM, & small bowel volvulus s/p ex lap with extensive small bowel resection and end jejunostomy approx 1 month PTA POD #1 from ex -lap with esophagogastrostomy, jejuno-jejunostomy, appendectomy.     Currently NPO   Pain control with Dilaudid PCA and IV Tylenol. Added Gabapentin and Flexeril.    H/H: 7.3/24.4   Restart Lovenox tomorrow   Continue Cefepime and Fluconazole with end date of 8/24 per ID   Continue TPN and Lipids. Patient with approx 15 cm jejunum distal to LOT therefore ability for nutritional absorption severely decreased. Enterade added   Electrolyte replacement  PRN      Dispo: Pending return of bowel function and adequate pain control post surgery    Halina Spencer MD   08/22/2019     Overall stable downward trend of hemoglobin noted patient will be started the low hemoglobin.  Will continue fluids observation    Because of the multiple anastamosis expires my concern over the potential anastomotic leaks.  Will obtain a Gastrografin swallow tomorrow with CT and if it looks okay will DC NG tube and leave the G-tube to gravity and if there is any concern about leak may need reoperation right away    Will address the PCA pump today and pain management

## 2019-08-22 NOTE — PROGRESS NOTES
Dressing changed ot Dr. Johanna OAKLEY on the unit and came in to see the incision. Several small blood clots noted on the gauze packing. Noted a large [place] of blood had oozed on to the blue pad under the patient but the ABD pad was clean. She also talked to the patient about her pain. Patient's Hr went up to 145 on telemetry and the monitor tech called. The patient was changing positions in the bed. Her Hr went down to 119. Will continue to monitor

## 2019-08-22 NOTE — PROGRESS NOTES
Dressing changed to LUQ incision. Noted with saturated  serosanguinous drainage. Dr. Perez. Called ordered continuous pulse ox, telemetry monitor monitor VS q 2 hours. Place a pressure dressing to the LUQ Notify doctor if drainage  Is bloody. Will continue to monitor.

## 2019-08-22 NOTE — OP NOTE
DATE OF PROCEDURE:  08/21/2019    PREOPERATIVE DIAGNOSIS:  The patient with a history of gastric bypass years back   with internal hernia resulting in necrotic intestine, necessitating removal   with ileostomy with sepsis, on TPN and parenteral nutrition.    POSTOPERATIVE DIAGNOSES:  1.  Extensive adhesions.  2.  Small gastric pouch, less than 10 mL pouch.  3.  Vagus taken down because of large dilated stomach and small intestine  4.  Gastric atony with stasis with large dilated stomach likely gastroparesis  5.  Twisted Kendra loop to 270 degrees.  6.  Chronically large dilated jejunum with stasis all over consistent with dysmotility  7.  Total Kendra length of about 85 cm.  8.  Biliopancreatic limb of about 20 cm from the ligament of Treitz.  9.  Jejunal anastomosis to ileostomy about 20 cm, terminal ileum of about 5 cm   attached to the cecum with intact ileocecal valve.  Final small bowel length   after anastomosis of 125 cm.  10.  Chronic appendicitis.  11.  Normal liver.  12.  Normal tubes and ovaries.  13.  Enlarged uterus with fibroids.    PROCEDURES DONE:  1.  Exploratory laparotomy.  2.  Extensive lysis of adhesion.  3.  Takedown of ileostomy.  4.  Esophagogastrostomy with 29 mm end-to-end anastomosis with AmnioFix around.  5.  Gastrostomy closure.  6.  Takedown of previous jejunojejunostomy and Jejunostomy closure using a staple and invagination of the stapled side.  7.  Jejunojejunostomy with combination of staple and handsewn technique with wrapping of AmnioFix   around.  8.  Jejunal ileostomy side to end handsewn anastomosis with AmnioFix around with Ileo plasty to enlarge the anastomosis as the terminal ileum was small with small stapled blind end.  9.  Appendectomy.  10.  Liver biopsy.  11.  A 20-Sinhala Malecot G-tube with Karen gastrostomy.  12.  ARIANA block.  13.  Novadaq examination of small bowel for perfusion.    SURGEON:  Joan Beth M.D.    SENIOR RESIDENT:  Dr. Jeanine Henry    SURGERY  RESIDENT:  Dr. Ernesto De Los Santos.    BLOOD LOSS:  About 150 mL.    The patient is stable and transferred to Recovery Room in stable condition with   no complication.    HISTORY AND INDICATION:  This is a 44-year-old female who underwent gastric   bypass years back, developed an internal hernia a few months back, requiring   laparotomy.  She underwent significant bowel resection.  She had to undergo   ileostomy and was placed on total parenteral nutrition.  The patient was then   referred here for further management.  She came with IV line sepsis.  She was   treated adequately for that.  She has had a previous evaluation done, which   showed a colonoscopy revealing about 5 cm of terminal ileum, MR enterography   showing about 5 cm of jejunum from common anastomosis.  During the discussion   with the surgeon, who performed the last surgery, the patient was supposed to   have adequate length of small bowel.    The patient was admitted.  Her PICC line was taken out and was changed to a   different PICC line and was placed on IV nutrition.  Her nutrition was way below   normal.  She was placed on parenteral nutrition and she was treated until the   sepsis was finally clear.  She was having intermittent episodes of nausea and   vomiting.  Her ileostomy output was also irregular, some days with minimal   output and some days with normal output.    After complete evaluation and discussion, the plan was to explore her and   restore her anatomy. Our team had a long discussion with her.  We talked to her   about reversing the gastric bypass and restoring the normal continuity of the   bowel.  I specifically addressed all the concerns and went over the risk of the   surgery such as bleeding, infection, DVT, PE, MI, stroke, enterocutaneous   fistula, the possibility of having short gut syndrome requiring liver and multivisceral  transplant, the possibility of anastomotic leaks  after surgery requiring a redo   laparotomies, prolonged  stay in the ICU, requiring TPN for a while, sepsis.    I also talked to her that since she does not have enough small bowel as per the   report, she is not a candidate for any sleeve gastrectomy once the gastric   bypass was reversed.  The patient understood and was okay with the concept of   reversing the bypass.  I also talked to her that she continues to monitor her   vitamin B levels as well as the D and calcium levels.  She also had elevated PTH   level and therefore I informed her that she would still require periodic   monitoring.  I elaborately discussed with her the possibility of long Operating   Room because of the adhesions.  After going over all the benefits and risk,   consent was obtained.    FINDINGS:  The patient had extensive adhesions to the anterior abdominal wall.    After taking the adhesions, the gastric pouch was examined.  The gastric pouch   was really small.  The patient had less than 10 mL of gastric pouch.  There was   a rim of the gastric pouch connected to the remnant stomach at the top most   Part close to cardiophrenic angle  The vagus seemed to have been taken down as the patient had a large   dilated remnant stomach consistent with gastric atony and stasis and the entire small bowel significantly dilated.  I mobilized   the gastrojejunostomy carefully without compromising the blood flow.  I had to   go all the way up to the left opal.  In the process, I realized there was a rim   of stomach connected to the pouch and when I examined the pouch, the pouch was   very small, I could mainly feel the esophagus and Kendra Y jejunum was connected   very close to the esophagus.  Also, the Kendra limb was twisted about 270   degrees.  There were some adhesions between the Kendra loop to the omentum, which   may be the reason contributing to the twist.  The whole jejunum was   significantly dilated more than three times the normal limit.  There was no   obvious anatomical obstruction; however, they  seemed to be chronically dilated   from stasis.  The liver looked completely normal.  The appendix was chronically   inflamed.  The patient had a remnant terminal ileum connected to the cecum of   about 5 cm.  The total Kendra length was about 85 cm.  The biliopancreatic limb   from the ligament of Treitz was about 20 cm from ligament of treitz..  The jejunal anastomosis to the   ileostomy was about 20 cm.  After complete restoration of her anatomy, the   patient had a small bowel length of 125 cm from the ligament of Treitz.  The   patient had normal ovaries on both sites.  The uterus was enlarged with   fibroids.  There were extensive adhesions to the anterior abdominal wall and   within the visceral organs.    PROCEDURE IN DETAIL:  The patient was brought to the Operating Room, was placed   in supine position.  She was then sedated and intubated.  A Weiss catheter was   placed in the bladder.  The patient already had a PICC line.  The ileostomy was   then approximated with a 2-0 silk tie using running stitch.  The abdomen was   prepped and draped in the usual sterile fashion.  Timeout was done to verify the   ID of the patient and the procedure and everyone concurred.    A midline incision was made all the way from xiphisternum to the pubic crest.    Carefully, the abdomen was entered at the upper part of the incision.  The   fascia was incised.  In the lower part of the incision, there were extensive   adhesions to the anterior abdominal wall.  The jejunum and the omentum was all   stuck to the anterior abdominal wall.  Meticulous lysis of adhesion was done.  I   spent the next 60 to 90 minutes in taking down the adhesions from the anterior   abdominal wall and between the loops of small bowel.  Following the takedown of   the adhesions, I then mobilized the ileostomy just at the level of the anterior   abdominal wall on the inside.  I transected the ileum using the iDrive stapler   with a gray tan load.   Following this, the wound protector was placed.    Abdominal wall was retracted appropriately after taking down the falciform   ligament completely using cunha retractor system.  The liver was found to be normal.  Since she has had a   bypass before, I just wanted to make sure there is no pathology with her liver.    A wedge biopsy of the liver was done and the edges were cauterized adequately.    Following this, the adhesions between the left lobe of the liver, undersurface   of the liver and the remnant stomach was all taken down completely.  The liver   was retracted up using the Cunha retractors.  The omentum was carefully   dissected off from the anterior surface of the stomach.  I then examined the   Ekndra limb and the gastrojejunostomy site.  Care was taken not to compromise the   pedicle on the lesser curvature.  Care was also taken not to injure any vagus   nerve in that area.  Following this, when I examined the remnant stomach, it was   found to be large and dilated consistent with gastric atony  The gastric pouch   was very small.  I was able to feel the NG tube in the esophagus and immediately   next to the esophagus, the jejunum was connected to the gastric pouch making   the gastric pouch really small.  Carefully, I was able to appreciate the staple   line.  It was carefully mobilized.  The pouch was completely mobilized.  There   was a rim on the upper part of the pouch connected to the remnant stomach.    After complete mobilization of this area, I realized that the pouch was really   small, close to less than 10 mL.  The jejunum was opened in a linear fashion   anteriorly.  I then inserted the anvil of the 29 mm EEA stapler into the small   remnant gastric pouch and the sharp end was brought to the anterior aspect of   the gastric pouch cranial to the existing gastrojejunal anastomosis.    Following this, the gastric pouch was transected just proximal to the   gasrojejunal anastomosis and  totally disconnecting the jejunum to the gastric   pouch.  The jejunum was then transected just distal to the area where the   jejunum was opened.  About 4 cm incision was made on the greater curvature in   the body of the stomach.  The handle of the EEA was inserted through the   opening.  An anastomosis between the most cranial part of the stomach to the   anvil was accomplished.  Following end-to-end anastomosis, I reinforced the   staple line using a 4-0 PDS stitch.  I also invaginated the esophagojejunal   staple line using 4-0 PDS stitch.  Following this, I then occluded the lumen of the   stomach, using an intestinal clamp, inflated the air through the NG tube to   check for any leaks.  There was found to be no air leak.  I irrigated the area   entirely, sprayed Evicel.  Following this, the gastrotomy site was examined.    The patient had significant gastric residue in the stomach.  The dilated stomach   contained lot of residue from the past.  The stomach was then completely   emptied using a combination of suction and irrigation.  Old retained food was   present inside the dilated stomach.  Following this, the stomach was closed in   two layers using 4-0 PDS stitch.    I then examined the Kendra limb.  Before disconnecting the Kendra limb from the   stomach, I realized that the Kendra limb was twisted to 270 degrees; however,   there was no vascular compromise.  The mesentery was like a thick cord.    Fortunately, there was no vascular compromise.  Following the disconnection of   the jejunum, I then mobilized the jejunum off the anterior surface of the   colon.  It was then placed back in the infracolic compartment.  All the   adhesions around were carefully taken down.  At this point, dissection was done   around the jejunojejunal anastomosis.  The patient had significantly dilated   jejunum all over.  The biliopancreatic limb and the limb all the way down to the   ileostomy was all completely dilated.  I then  transected the jejunojejunostomy   using iDrive stapler in such a way that I was on the wall of the jejunum, but   not compromising the lumen because of the significant distention of the jejunum.    I then disconnected the jejunojejunal anastomosis and I invaginated the staple   line on the jejunum using 4-0 PDS stitch.  The stapled end of the   biliopancreatic limb was invaginated using 4-0 PDS stitch.  I then carefully   released all the adhesions and made sure the anatomy was correct.  The distal   end of the biliopancreatic limb was then brought close to the previous   Kendra limb  Proximal stapled end and side-to-side anastomosis was accomplished using a combination of   stapler and handsewn technique.  The mesenteric gap was approximated with 4-0   PDS stitch.  Following this, I then trimmed the previous site of ileostomy.  I   then re-resected the tip of the stapled end.  The jejunum at that area looked   pink and healthy.  I then invaginated the staple line.  The terminal ileum was   examined.  The patient had only a small stub of the terminal ileum attached to   the cecum and it was also small and atrophic.  Since I wanted to save the   ileocecal valve, I decided to do a handsewn anastomosis.  Since there was   significant size discrepancy between the significantly enlarged jejunum   proximally and a small terminal ileum, a side-to-end anastomosis was   accomplished between the jejunum and the terminal ileum in a two-layer fashion   using handsewn technique with 4-0 Prolene and 4-0 PDS stitch. Ileoplasty was done to enlarge the anastamosis The mesenteric   gap was then approximated using 4-0 PDS stitch.    Following this, 2 mL of ICG intravenously was given followed by 10 mL, normal   flush.  The infrared camera was used to check for circulation.  The patient had   excellent perfusion all the way from stomach at the esophagogastric anastomosis,   all the way starting from the ligament of Treitz up to the  cecum.  There was no   twisting of the mesentery.  The mesentery seems to be lying in a normal fashion   without any twist, without any contracture.    Following this, the appendix was found to be chronically inflamed, mesoappendix   was taken down.  The appendix was transected using 2-0 Vicryl ties and was   invaginated using 4-0 PDS stitch on to the cecum.  The whole abdomen was   irrigated then with lots of antibiotic solution since there was some spillage of   enteric content in the belly.  Meticulous hemostasis was accomplished all   around.    I then examined the pelvis.  The patient had normal ovaries on both sides.    Normal tubes, the uterus was found to be enlarged with fibroids over the   surface.  At this point, I then checked the entire small bowel and the remaining   small bowel length was close to about 125 cm from the ligament of Treitz.  I   emptied the whole jejunum, still the jejunum appeared to be dilated and appeared   to be having stasis.  Since the patient had significant gastric stasis, to rule   out any issues with the gastric emptying issue, I then decided to place a   G-tube in the stomach.  Following this, the anterior abdominal wall was exposed.    The skin around the ileostomy site was incised in a circular fashion around   the ileostomy.  Dissection was done.  The whole ileostomy from the anterior   abdominal wall from inside to outside was completely resected.  Following this,   the fascia was irrigated with lots of antibiotic solution with Betadine.  The   fascia was then closed in two layers using interrupted #1 PDS stitch closing the   anterior and posterior fascia separately.  Following this, the area where the   stomach would come close to the anterior abdominal wall was marked.  A 20-Icelandic   Malecot tube was brought into the abdominal cavity through outside from the   left abdominal wall.  A pursestring stitch was made around the body of the   stomach.  A gastrotomy was made.   The tip of the mushroom catheter was inserted   into the stomach.  The pursestring suture was tied around the G-tube and was   secured to the Malecot catheter and a second pursestring stitch was made around   this G-tube site and was again secured to the Malecot tube.  A Karen gastrostomy   was then made by securing the stomach to the anterior abdominal wall and the   tube was connected to the drainage bag.  After complete irrigation and securing   adequate hemostasis, AmnioFix was placed at all the areas of anastomosis.    Evicel was then sprayed over the anastomosis area.  The omentum was then placed   over the whole infracolic compartment.  Before wrapping the anastomosis, I again   checked the perfusion of the small bowel using ICG and Firefly method.  The   patient had excellent perfusion all over.  The greater omentum was then placed   over the infracolic compartment.  The abdominal wall was infiltrated with a   dilute mixture of Marcaine, Exparel and normal saline.  The fascia was   approximated with #1 PDS stitch in a running fashion.  Skin was closed in 2   layers using 3-0 Vicryl and 4-0 Monocryl.  The instrument and sponge counts were   correct.  KUB did not show any evidence of foreign body.  The ileostomy site   was approximated loosely using interrupted 2-0 nylon stitch.  The G-tube was   secured to the skin using 2-0 nylon stitch.  The patient was stable.  Blood loss   was about close to 200 mL.  She was extubated, taken to Recovery in stable   condition with no complications.      TR/IN  dd: 08/21/2019 19:58:22 (CDT)  td: 08/21/2019 22:23:52 (CDCATHY)  Doc ID   #[id number]  Job ID #[id number]    CC:

## 2019-08-22 NOTE — ANESTHESIA POSTPROCEDURE EVALUATION
Anesthesia Post Evaluation    Patient: Parvin Shaquille    Procedure(s) Performed: Procedure(s) (LRB):  GASTRECTOMY, PARTIAL exploratory laparotomy, esophagogastrostomy, jejunostomy, jejunoileostomy, appenedctomy, brad gastrotstomy, liver biopsy (N/A)  LAPAROTOMY, EXPLORATORY (N/A)    Final Anesthesia Type: general  Patient location during evaluation: PACU  Patient participation: Yes- Able to Participate  Level of consciousness: awake and alert, oriented and awake  Post-procedure vital signs: reviewed and stable  Pain management: adequate  Airway patency: patent  PONV status at discharge: No PONV  Anesthetic complications: no      Cardiovascular status: blood pressure returned to baseline  Respiratory status: unassisted and room air  Hydration status: euvolemic  Follow-up not needed.          Vitals Value Taken Time   /71 8/22/2019  5:15 AM   Temp 36 °C (96.8 °F) 8/22/2019  5:15 AM   Pulse 107 8/22/2019  5:15 AM   Resp 18 8/22/2019  2:32 AM   SpO2 99 % 8/22/2019  2:32 AM         Event Time     Out of Recovery 08/21/2019 18:34:46          Pain/Yesy Score: Pain Rating Prior to Med Admin: 10 (8/22/2019  6:41 AM)

## 2019-08-22 NOTE — PLAN OF CARE
Problem: Adult Inpatient Plan of Care  Goal: Plan of Care Review  Outcome: Revised  Patient is awake and alert as well as sleeping on and off.  Family members were at bedside but left.  Spouse at bedside at this time.  Has complaints of pain and states that her pain is not being relieved in spite of having a PCA pump.  Frequency to PCA pump changed and patient is relieved from pain.  Patient receives Toradol, Flexeril and Neurontin this shift.  Abdominal incision changed per Wound Care Nurse. Abdominal incision is well-approximated and intact with Dermabond.  Patient receives Clinimix, Antibiotics, Magnesium Sulfate and Calcium Gluconate.  NGT is connected to low continuous wall suction, patent and flushed after giving crushed medications.  Safety is maintained with bed low, wheels locked and side rails up.  Call light within reach.  Bed alarm on.  Instructed to call for any assistance and patient verbalized understanding.  Will continue to monitor.

## 2019-08-23 LAB
ABO + RH BLD: NORMAL
ALBUMIN SERPL BCP-MCNC: 2.6 G/DL (ref 3.5–5.2)
ALP SERPL-CCNC: 80 U/L (ref 55–135)
ALT SERPL W/O P-5'-P-CCNC: 27 U/L (ref 10–44)
ANION GAP SERPL CALC-SCNC: 6 MMOL/L (ref 8–16)
ANISOCYTOSIS BLD QL SMEAR: SLIGHT
AST SERPL-CCNC: 29 U/L (ref 10–40)
BASOPHILS # BLD AUTO: 0.01 K/UL (ref 0–0.2)
BASOPHILS NFR BLD: 0.1 % (ref 0–1.9)
BILIRUB SERPL-MCNC: 0.2 MG/DL (ref 0.1–1)
BLD GP AB SCN CELLS X3 SERPL QL: NORMAL
BLD PROD TYP BPU: NORMAL
BLD PROD TYP BPU: NORMAL
BLOOD UNIT EXPIRATION DATE: NORMAL
BLOOD UNIT EXPIRATION DATE: NORMAL
BLOOD UNIT TYPE CODE: 9500
BLOOD UNIT TYPE CODE: 9500
BLOOD UNIT TYPE: NORMAL
BLOOD UNIT TYPE: NORMAL
BUN SERPL-MCNC: 49 MG/DL (ref 6–20)
CALCIUM SERPL-MCNC: 8.5 MG/DL (ref 8.7–10.5)
CHLORIDE SERPL-SCNC: 107 MMOL/L (ref 95–110)
CO2 SERPL-SCNC: 23 MMOL/L (ref 23–29)
CODING SYSTEM: NORMAL
CODING SYSTEM: NORMAL
CREAT SERPL-MCNC: 0.7 MG/DL (ref 0.5–1.4)
DIFFERENTIAL METHOD: ABNORMAL
DISPENSE STATUS: NORMAL
DISPENSE STATUS: NORMAL
EOSINOPHIL # BLD AUTO: 0 K/UL (ref 0–0.5)
EOSINOPHIL NFR BLD: 0.2 % (ref 0–8)
ERYTHROCYTE [DISTWIDTH] IN BLOOD BY AUTOMATED COUNT: 18.6 % (ref 11.5–14.5)
EST. GFR  (AFRICAN AMERICAN): >60 ML/MIN/1.73 M^2
EST. GFR  (NON AFRICAN AMERICAN): >60 ML/MIN/1.73 M^2
GLUCOSE SERPL-MCNC: 118 MG/DL (ref 70–110)
HCT VFR BLD AUTO: 17.8 % (ref 37–48.5)
HGB BLD-MCNC: 5.4 G/DL (ref 12–16)
HYPOCHROMIA BLD QL SMEAR: ABNORMAL
LYMPHOCYTES # BLD AUTO: 0.9 K/UL (ref 1–4.8)
LYMPHOCYTES NFR BLD: 6.2 % (ref 18–48)
MAGNESIUM SERPL-MCNC: 2.6 MG/DL (ref 1.6–2.6)
MCH RBC QN AUTO: 24 PG (ref 27–31)
MCHC RBC AUTO-ENTMCNC: 30.3 G/DL (ref 32–36)
MCV RBC AUTO: 79 FL (ref 82–98)
MONOCYTES # BLD AUTO: 1.2 K/UL (ref 0.3–1)
MONOCYTES NFR BLD: 8.8 % (ref 4–15)
NEUTROPHILS # BLD AUTO: 11.9 K/UL (ref 1.8–7.7)
NEUTROPHILS NFR BLD: 84.7 % (ref 38–73)
OVALOCYTES BLD QL SMEAR: ABNORMAL
PHOSPHATE SERPL-MCNC: 2.6 MG/DL (ref 2.7–4.5)
PLATELET # BLD AUTO: 353 K/UL (ref 150–350)
PLATELET BLD QL SMEAR: ABNORMAL
PMV BLD AUTO: 9.4 FL (ref 9.2–12.9)
POCT GLUCOSE: 105 MG/DL (ref 70–110)
POCT GLUCOSE: 107 MG/DL (ref 70–110)
POCT GLUCOSE: 136 MG/DL (ref 70–110)
POCT GLUCOSE: 94 MG/DL (ref 70–110)
POIKILOCYTOSIS BLD QL SMEAR: SLIGHT
POLYCHROMASIA BLD QL SMEAR: ABNORMAL
POTASSIUM SERPL-SCNC: 4.1 MMOL/L (ref 3.5–5.1)
PROT SERPL-MCNC: 5.5 G/DL (ref 6–8.4)
RBC # BLD AUTO: 2.25 M/UL (ref 4–5.4)
SODIUM SERPL-SCNC: 136 MMOL/L (ref 136–145)
TRANS ERYTHROCYTES VOL PATIENT: NORMAL ML
TRANS ERYTHROCYTES VOL PATIENT: NORMAL ML
WBC # BLD AUTO: 14.13 K/UL (ref 3.9–12.7)

## 2019-08-23 PROCEDURE — 25500020 PHARM REV CODE 255: Performed by: SURGERY

## 2019-08-23 PROCEDURE — 25000003 PHARM REV CODE 250: Performed by: SURGERY

## 2019-08-23 PROCEDURE — P9021 RED BLOOD CELLS UNIT: HCPCS

## 2019-08-23 PROCEDURE — 84100 ASSAY OF PHOSPHORUS: CPT

## 2019-08-23 PROCEDURE — 21400001 HC TELEMETRY ROOM

## 2019-08-23 PROCEDURE — 86901 BLOOD TYPING SEROLOGIC RH(D): CPT

## 2019-08-23 PROCEDURE — S0028 INJECTION, FAMOTIDINE, 20 MG: HCPCS | Performed by: SURGERY

## 2019-08-23 PROCEDURE — 99900035 HC TECH TIME PER 15 MIN (STAT)

## 2019-08-23 PROCEDURE — 25000003 PHARM REV CODE 250: Performed by: STUDENT IN AN ORGANIZED HEALTH CARE EDUCATION/TRAINING PROGRAM

## 2019-08-23 PROCEDURE — 83735 ASSAY OF MAGNESIUM: CPT

## 2019-08-23 PROCEDURE — 86920 COMPATIBILITY TEST SPIN: CPT

## 2019-08-23 PROCEDURE — 63600175 PHARM REV CODE 636 W HCPCS: Performed by: SURGERY

## 2019-08-23 PROCEDURE — 36430 TRANSFUSION BLD/BLD COMPNT: CPT

## 2019-08-23 PROCEDURE — 27000221 HC OXYGEN, UP TO 24 HOURS

## 2019-08-23 PROCEDURE — 80053 COMPREHEN METABOLIC PANEL: CPT

## 2019-08-23 PROCEDURE — 94770 HC EXHALED C02 TEST: CPT

## 2019-08-23 PROCEDURE — 94761 N-INVAS EAR/PLS OXIMETRY MLT: CPT

## 2019-08-23 PROCEDURE — 63600175 PHARM REV CODE 636 W HCPCS: Performed by: STUDENT IN AN ORGANIZED HEALTH CARE EDUCATION/TRAINING PROGRAM

## 2019-08-23 PROCEDURE — 85025 COMPLETE CBC W/AUTO DIFF WBC: CPT

## 2019-08-23 PROCEDURE — A4217 STERILE WATER/SALINE, 500 ML: HCPCS | Performed by: SURGERY

## 2019-08-23 RX ORDER — METOCLOPRAMIDE HYDROCHLORIDE 5 MG/ML
10 INJECTION INTRAMUSCULAR; INTRAVENOUS EVERY 6 HOURS
Status: DISCONTINUED | OUTPATIENT
Start: 2019-08-23 | End: 2019-08-28

## 2019-08-23 RX ORDER — HYDROCODONE BITARTRATE AND ACETAMINOPHEN 500; 5 MG/1; MG/1
TABLET ORAL
Status: DISCONTINUED | OUTPATIENT
Start: 2019-08-23 | End: 2019-08-25

## 2019-08-23 RX ADMIN — IOHEXOL 30 ML: 350 INJECTION, SOLUTION INTRAVENOUS at 01:08

## 2019-08-23 RX ADMIN — CEFEPIME HYDROCHLORIDE 2 G: 2 INJECTION, SOLUTION INTRAVENOUS at 06:08

## 2019-08-23 RX ADMIN — SODIUM PHOSPHATE, MONOBASIC, MONOHYDRATE 30 MMOL: 276; 142 INJECTION, SOLUTION INTRAVENOUS at 09:08

## 2019-08-23 RX ADMIN — Medication: at 07:08

## 2019-08-23 RX ADMIN — GABAPENTIN 300 MG: 300 CAPSULE ORAL at 02:08

## 2019-08-23 RX ADMIN — IOHEXOL 75 ML: 350 INJECTION, SOLUTION INTRAVENOUS at 02:08

## 2019-08-23 RX ADMIN — GABAPENTIN 300 MG: 300 CAPSULE ORAL at 09:08

## 2019-08-23 RX ADMIN — ACETAMINOPHEN 999.01 MG: 650 SOLUTION ORAL at 06:08

## 2019-08-23 RX ADMIN — Medication: at 08:08

## 2019-08-23 RX ADMIN — KETOROLAC TROMETHAMINE 15 MG: 30 INJECTION, SOLUTION INTRAMUSCULAR at 12:08

## 2019-08-23 RX ADMIN — SODIUM CHLORIDE: 234 INJECTION INTRAMUSCULAR; INTRAVENOUS; SUBCUTANEOUS at 05:08

## 2019-08-23 RX ADMIN — SODIUM CHLORIDE: 0.9 INJECTION, SOLUTION INTRAVENOUS at 12:08

## 2019-08-23 RX ADMIN — MICONAZOLE NITRATE: 20 POWDER TOPICAL at 08:08

## 2019-08-23 RX ADMIN — SUCRALFATE 1 G: 1 SUSPENSION ORAL at 09:08

## 2019-08-23 RX ADMIN — CYANOCOBALAMIN 1000 MCG: 1000 INJECTION, SOLUTION INTRAMUSCULAR; SUBCUTANEOUS at 08:08

## 2019-08-23 RX ADMIN — Medication: at 03:08

## 2019-08-23 RX ADMIN — CEFEPIME HYDROCHLORIDE 2 G: 2 INJECTION, SOLUTION INTRAVENOUS at 09:08

## 2019-08-23 RX ADMIN — GABAPENTIN 300 MG: 300 CAPSULE ORAL at 08:08

## 2019-08-23 RX ADMIN — FAMOTIDINE 20 MG: 10 INJECTION, SOLUTION INTRAVENOUS at 08:08

## 2019-08-23 RX ADMIN — IRON SUCROSE 100 MG: 20 INJECTION, SOLUTION INTRAVENOUS at 09:08

## 2019-08-23 RX ADMIN — FLUCONAZOLE 200 MG: 2 INJECTION, SOLUTION INTRAVENOUS at 01:08

## 2019-08-23 RX ADMIN — METOCLOPRAMIDE 10 MG: 5 INJECTION, SOLUTION INTRAMUSCULAR; INTRAVENOUS at 06:08

## 2019-08-23 RX ADMIN — CEFEPIME HYDROCHLORIDE 2 G: 2 INJECTION, SOLUTION INTRAVENOUS at 02:08

## 2019-08-23 RX ADMIN — MICONAZOLE NITRATE: 20 POWDER TOPICAL at 09:08

## 2019-08-23 RX ADMIN — FAMOTIDINE 20 MG: 10 INJECTION, SOLUTION INTRAVENOUS at 09:08

## 2019-08-23 RX ADMIN — SODIUM CHLORIDE: 0.9 INJECTION, SOLUTION INTRAVENOUS at 07:08

## 2019-08-23 NOTE — PLAN OF CARE
Problem: Adult Inpatient Plan of Care  Goal: Plan of Care Review  Outcome: Ongoing (interventions implemented as appropriate)  Pain pump in use with good pain relief. IV nutrition, TPN in progress and IV fluids in progress. Antibiotics IV given as scheduled. NGT intact to left nare to LCW Sx. G tube to gravity zhong in place. No complaints of nausea overnight. Blood glucose 137 at MN. On Telemetry SR/ST low 100's.

## 2019-08-23 NOTE — PROGRESS NOTES
Neuroendocrine Surgery  Progress Note    Hospital course: 43 y.o. woman transferred from OSH with abdominal pain and sepsis (GNR). 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  Hospital stay complicated by bilateral pulmonary embolisms. Patient reports having no issues with gastric bypass until episode of abdominal pain and resulting surgery last month. She now reports having a few days of abdominal pain with associated nausea/vomiting, fever/chills. Patient has been on TPN at home since discharge from hospital last month via PICC line. RUE PICC line removed yesterday due to suspected sepsis [place] [facility] [place]PN initiated on [place] 2. Blood cultures grew multiresistant E coli, antibiotic therapy changed from zosyn to cefepime.     S:  Post-op 2  NAEON. An episode of tachycardia of 116  Patient fatigue and unable to stand or sit.   Currently NPO  Pain well-controlled with PCA pump  Patient was able to sit in a chair yesterday  Dressing needed to be changed twice overnight due to bleeding  H/H this AM was 5.4/17.8    O:  Temp:  [98.2 °F (36.8 °C)-98.8 °F (37.1 °C)] 98.8 °F (37.1 °C)  Pulse:  [] 93  Resp:  [18-22] 20  SpO2:  [97 %-100 %] 100 %  BP: (106-119)/(64-72) 106/65    Physical Exam:  Gen: Alert and oriented x3. Appears comfortable in bed  HEENT: normocephalic and atraumatic. EOMI. NG tube in place  Resp: Comfortable on room air  CV: Tachycardiac  Abd: Incision intact. Dressing on the left lower extremity clean dry and intact recently changed this AM, G-tube in place  Ext: warm and well perfused  MSK: normal range of motion, normal strength  Neuro: no focal deficits, normal sensation    I/O:  Intake/Output - Last 3 Shifts       08/21 0700 - 08/22 0659 08/22 0700 - 08/23 0659 08/23 0700 - 08/24 0659    P.O. 0 0     I.V. (mL/kg)  2502 (30) 1492.9 (17.6)     Other 0      NG/GT 0 300     IV Piggyback 400 450     .4 1202.5     Total Intake(mL/kg) 3599.4 (43.2) 3445.4 (40.7)     Urine (mL/kg/hr) 2045 (1) 1825 (0.9)     Drains 12.5 610     Stool 0 0     Total Output 2057.5 2435     Net +1541.9 +1010.4            Stool Occurrence 0 x 0 x         CBC:   Recent Labs     08/21/19  1809 08/22/19  0546 08/23/19  0536   WBC 7.95 20.25* 14.13*   HGB 8.5* 7.3* 5.4*   HCT 28.6* 24.4* 17.8*   * 548* 353*     CMP:  Recent Labs     08/21/19  1809 08/22/19  0420 08/23/19  0536    135* 136   K 4.0 4.2 4.1    106 107   CO2 22* 21* 23    248* 118*   BUN 21* 31* 49*   CREATININE 0.8 0.9 0.7   CALCIUM 7.9* 7.9* 8.5*   BILITOT  --  0.3 0.2   ALKPHOS  --  89 80   AST  --  34 29   ALT  --  38 27   ANIONGAP 9 8 6*     Recent Labs     08/21/19  0426 08/22/19  0420 08/23/19  0536   MG 1.9 1.6 2.6   PHOS 3.5 4.1 2.6*       Micro:   Microbiology Results (last 7 days)     ** No results found for the last 168 hours. **        A/P: 44 y.o. female admitted 8/8/2019 with history of eddie en Y gastric bypass 13 years ago, DM, & small bowel volvulus s/p ex lap with extensive small bowel resection and end jejunostomy approx 1 month PTA POD #2 from ex -lap with esophagogastrostomy, jejuno-jejunostomy, appendectomy.     Currently NPO   CT of Abdomen with contrast today   Pain control with Dilaudid PCA and IV Tylenol. Added Gabapentin and Flexeril.    H/H: 5.4/17.8, 2uRBC's ordered. Critical that patient received blood transfusion as patient is POD #2, tachycardiac, and unable to tolerate sitting and standing.   Continue IV Iron.   Continue Cefepime and Fluconazole with end date of 8/24 per ID   Continue TPN and Lipids   Electrolyte replacement PRN      Dispo: Pending return of bowel function and adequate pain control post surgery, improvement in anemia    Halina Spencer MD   08/23/2019

## 2019-08-23 NOTE — PROGRESS NOTES
Weiss D/C'd patient informed she has 6 hours to void on her own before we have to bladder scan her and to use the speci pan to measure her output.

## 2019-08-23 NOTE — PLAN OF CARE
TN rounded on pt   POD #2   extensive small bowel resection and end jejunostomy approx 1 month PTA POD #2 from ex -lap with esophagogastrostomy, jejuno-jejunostomy, appendectomy    pca -   urged cont'd use of IS, C&DB and ROM post op     will continue to monitor pt status to determine d/c needs   current with [facility] wilma Cortes and [facility] for home TPN         08/23/19 1712   Discharge Reassessment   Assessment Type Discharge Planning Reassessment   Provided patient/caregiver education on the expected discharge date and the discharge plan Yes   Discharge Plan A Home with family;Home;Home Health

## 2019-08-23 NOTE — PROGRESS NOTES
Dressing changed to LUQ as ordered. Patient tolerated it well. Small amount bloody drainage noted on inner gauze and packing.

## 2019-08-24 LAB
ALBUMIN SERPL BCP-MCNC: 2.2 G/DL (ref 3.5–5.2)
ALP SERPL-CCNC: 77 U/L (ref 55–135)
ALT SERPL W/O P-5'-P-CCNC: 22 U/L (ref 10–44)
ANION GAP SERPL CALC-SCNC: 6 MMOL/L (ref 8–16)
AST SERPL-CCNC: 20 U/L (ref 10–40)
BASOPHILS # BLD AUTO: 0.01 K/UL (ref 0–0.2)
BASOPHILS NFR BLD: 0.1 % (ref 0–1.9)
BILIRUB SERPL-MCNC: 0.3 MG/DL (ref 0.1–1)
BLD PROD TYP BPU: NORMAL
BLOOD UNIT EXPIRATION DATE: NORMAL
BLOOD UNIT TYPE CODE: 9500
BLOOD UNIT TYPE: NORMAL
BUN SERPL-MCNC: 18 MG/DL (ref 6–20)
CALCIUM SERPL-MCNC: 8.3 MG/DL (ref 8.7–10.5)
CHLORIDE SERPL-SCNC: 108 MMOL/L (ref 95–110)
CO2 SERPL-SCNC: 25 MMOL/L (ref 23–29)
CODING SYSTEM: NORMAL
CREAT SERPL-MCNC: 0.5 MG/DL (ref 0.5–1.4)
DIFFERENTIAL METHOD: ABNORMAL
DISPENSE STATUS: NORMAL
EOSINOPHIL # BLD AUTO: 0.3 K/UL (ref 0–0.5)
EOSINOPHIL NFR BLD: 2.4 % (ref 0–8)
ERYTHROCYTE [DISTWIDTH] IN BLOOD BY AUTOMATED COUNT: 17.7 % (ref 11.5–14.5)
EST. GFR  (AFRICAN AMERICAN): >60 ML/MIN/1.73 M^2
EST. GFR  (NON AFRICAN AMERICAN): >60 ML/MIN/1.73 M^2
GLUCOSE SERPL-MCNC: 115 MG/DL (ref 70–110)
HCT VFR BLD AUTO: 22.3 % (ref 37–48.5)
HGB BLD-MCNC: 6.8 G/DL (ref 12–16)
LYMPHOCYTES # BLD AUTO: 1.1 K/UL (ref 1–4.8)
LYMPHOCYTES NFR BLD: 9.1 % (ref 18–48)
MAGNESIUM SERPL-MCNC: 1.8 MG/DL (ref 1.6–2.6)
MCH RBC QN AUTO: 24.7 PG (ref 27–31)
MCHC RBC AUTO-ENTMCNC: 30.5 G/DL (ref 32–36)
MCV RBC AUTO: 81 FL (ref 82–98)
MONOCYTES # BLD AUTO: 0.8 K/UL (ref 0.3–1)
MONOCYTES NFR BLD: 6.6 % (ref 4–15)
NEUTROPHILS # BLD AUTO: 9.4 K/UL (ref 1.8–7.7)
NEUTROPHILS NFR BLD: 81.8 % (ref 38–73)
PHOSPHATE SERPL-MCNC: 2.4 MG/DL (ref 2.7–4.5)
PLATELET # BLD AUTO: 324 K/UL (ref 150–350)
PMV BLD AUTO: 9.2 FL (ref 9.2–12.9)
POCT GLUCOSE: 122 MG/DL (ref 70–110)
POCT GLUCOSE: 128 MG/DL (ref 70–110)
POCT GLUCOSE: 134 MG/DL (ref 70–110)
POCT GLUCOSE: 135 MG/DL (ref 70–110)
POTASSIUM SERPL-SCNC: 3.7 MMOL/L (ref 3.5–5.1)
PROT SERPL-MCNC: 5.2 G/DL (ref 6–8.4)
RBC # BLD AUTO: 2.75 M/UL (ref 4–5.4)
SODIUM SERPL-SCNC: 139 MMOL/L (ref 136–145)
TRANS ERYTHROCYTES VOL PATIENT: NORMAL ML
WBC # BLD AUTO: 11.51 K/UL (ref 3.9–12.7)

## 2019-08-24 PROCEDURE — 85025 COMPLETE CBC W/AUTO DIFF WBC: CPT

## 2019-08-24 PROCEDURE — 21400001 HC TELEMETRY ROOM

## 2019-08-24 PROCEDURE — 63600175 PHARM REV CODE 636 W HCPCS: Performed by: SURGERY

## 2019-08-24 PROCEDURE — 63600175 PHARM REV CODE 636 W HCPCS: Performed by: STUDENT IN AN ORGANIZED HEALTH CARE EDUCATION/TRAINING PROGRAM

## 2019-08-24 PROCEDURE — 25000003 PHARM REV CODE 250: Performed by: SURGERY

## 2019-08-24 PROCEDURE — P9021 RED BLOOD CELLS UNIT: HCPCS

## 2019-08-24 PROCEDURE — S0028 INJECTION, FAMOTIDINE, 20 MG: HCPCS | Performed by: SURGERY

## 2019-08-24 PROCEDURE — 27000221 HC OXYGEN, UP TO 24 HOURS

## 2019-08-24 PROCEDURE — 25000003 PHARM REV CODE 250: Performed by: STUDENT IN AN ORGANIZED HEALTH CARE EDUCATION/TRAINING PROGRAM

## 2019-08-24 PROCEDURE — 94761 N-INVAS EAR/PLS OXIMETRY MLT: CPT

## 2019-08-24 PROCEDURE — 80053 COMPREHEN METABOLIC PANEL: CPT

## 2019-08-24 PROCEDURE — 94770 HC EXHALED C02 TEST: CPT

## 2019-08-24 PROCEDURE — A4217 STERILE WATER/SALINE, 500 ML: HCPCS | Performed by: SURGERY

## 2019-08-24 PROCEDURE — 36430 TRANSFUSION BLD/BLD COMPNT: CPT

## 2019-08-24 PROCEDURE — 99900035 HC TECH TIME PER 15 MIN (STAT)

## 2019-08-24 PROCEDURE — 83735 ASSAY OF MAGNESIUM: CPT

## 2019-08-24 PROCEDURE — 84100 ASSAY OF PHOSPHORUS: CPT

## 2019-08-24 RX ORDER — TRAMADOL HYDROCHLORIDE 50 MG/1
50 TABLET ORAL EVERY 6 HOURS SCHEDULED
Status: DISCONTINUED | OUTPATIENT
Start: 2019-08-24 | End: 2019-08-25

## 2019-08-24 RX ORDER — LIDOCAINE HYDROCHLORIDE 10 MG/ML
20 INJECTION INFILTRATION; PERINEURAL ONCE
Status: COMPLETED | OUTPATIENT
Start: 2019-08-24 | End: 2019-08-24

## 2019-08-24 RX ORDER — POTASSIUM CHLORIDE 7.45 MG/ML
10 INJECTION INTRAVENOUS
Status: DISPENSED | OUTPATIENT
Start: 2019-08-24 | End: 2019-08-24

## 2019-08-24 RX ORDER — OXYCODONE HYDROCHLORIDE 5 MG/1
10 TABLET ORAL EVERY 6 HOURS PRN
Status: DISCONTINUED | OUTPATIENT
Start: 2019-08-24 | End: 2019-08-25

## 2019-08-24 RX ORDER — CYCLOBENZAPRINE HCL 5 MG
5 TABLET ORAL 3 TIMES DAILY
Status: DISCONTINUED | OUTPATIENT
Start: 2019-08-24 | End: 2019-09-01 | Stop reason: HOSPADM

## 2019-08-24 RX ORDER — HYDROCODONE BITARTRATE AND ACETAMINOPHEN 500; 5 MG/1; MG/1
TABLET ORAL
Status: DISCONTINUED | OUTPATIENT
Start: 2019-08-24 | End: 2019-09-01 | Stop reason: HOSPADM

## 2019-08-24 RX ORDER — HYDROMORPHONE HYDROCHLORIDE 1 MG/ML
0.5 INJECTION, SOLUTION INTRAMUSCULAR; INTRAVENOUS; SUBCUTANEOUS EVERY 4 HOURS PRN
Status: DISCONTINUED | OUTPATIENT
Start: 2019-08-24 | End: 2019-08-25

## 2019-08-24 RX ORDER — POTASSIUM CHLORIDE 14.9 MG/ML
30 INJECTION INTRAVENOUS ONCE
Status: DISCONTINUED | OUTPATIENT
Start: 2019-08-24 | End: 2019-08-24

## 2019-08-24 RX ORDER — OXYCODONE HYDROCHLORIDE 5 MG/1
5 TABLET ORAL EVERY 4 HOURS PRN
Status: DISCONTINUED | OUTPATIENT
Start: 2019-08-24 | End: 2019-08-25

## 2019-08-24 RX ORDER — HYDROMORPHONE HYDROCHLORIDE 1 MG/ML
0.5 INJECTION, SOLUTION INTRAMUSCULAR; INTRAVENOUS; SUBCUTANEOUS ONCE
Status: COMPLETED | OUTPATIENT
Start: 2019-08-24 | End: 2019-08-24

## 2019-08-24 RX ORDER — ACETAMINOPHEN 650 MG/20.3ML
1000 LIQUID ORAL EVERY 6 HOURS SCHEDULED
Status: DISCONTINUED | OUTPATIENT
Start: 2019-08-24 | End: 2019-08-26

## 2019-08-24 RX ADMIN — Medication: at 07:08

## 2019-08-24 RX ADMIN — POTASSIUM CHLORIDE 10 MEQ: 7.46 INJECTION, SOLUTION INTRAVENOUS at 05:08

## 2019-08-24 RX ADMIN — OXYCODONE HYDROCHLORIDE 10 MG: 5 TABLET ORAL at 03:08

## 2019-08-24 RX ADMIN — HYDROMORPHONE HYDROCHLORIDE 0.5 MG: 1 INJECTION, SOLUTION INTRAMUSCULAR; INTRAVENOUS; SUBCUTANEOUS at 10:08

## 2019-08-24 RX ADMIN — MICONAZOLE NITRATE: 20 POWDER TOPICAL at 09:08

## 2019-08-24 RX ADMIN — HYDROMORPHONE HYDROCHLORIDE 0.5 MG: 1 INJECTION, SOLUTION INTRAMUSCULAR; INTRAVENOUS; SUBCUTANEOUS at 01:08

## 2019-08-24 RX ADMIN — GABAPENTIN 300 MG: 300 CAPSULE ORAL at 03:08

## 2019-08-24 RX ADMIN — METOCLOPRAMIDE 10 MG: 5 INJECTION, SOLUTION INTRAMUSCULAR; INTRAVENOUS at 06:08

## 2019-08-24 RX ADMIN — SUCRALFATE 1 G: 1 SUSPENSION ORAL at 05:08

## 2019-08-24 RX ADMIN — ACETAMINOPHEN 999.01 MG: 650 SOLUTION ORAL at 12:08

## 2019-08-24 RX ADMIN — FLUCONAZOLE 200 MG: 2 INJECTION, SOLUTION INTRAVENOUS at 01:08

## 2019-08-24 RX ADMIN — LIDOCAINE HYDROCHLORIDE 20 ML: 10 INJECTION, SOLUTION INFILTRATION; PERINEURAL at 02:08

## 2019-08-24 RX ADMIN — Medication: at 01:08

## 2019-08-24 RX ADMIN — CEFEPIME HYDROCHLORIDE 2 G: 2 INJECTION, SOLUTION INTRAVENOUS at 05:08

## 2019-08-24 RX ADMIN — SODIUM CHLORIDE: 234 INJECTION INTRAMUSCULAR; INTRAVENOUS; SUBCUTANEOUS at 05:08

## 2019-08-24 RX ADMIN — CYANOCOBALAMIN 1000 MCG: 1000 INJECTION, SOLUTION INTRAMUSCULAR; SUBCUTANEOUS at 09:08

## 2019-08-24 RX ADMIN — IRON SUCROSE 100 MG: 20 INJECTION, SOLUTION INTRAVENOUS at 09:08

## 2019-08-24 RX ADMIN — CEFEPIME HYDROCHLORIDE 2 G: 2 INJECTION, SOLUTION INTRAVENOUS at 09:08

## 2019-08-24 RX ADMIN — SODIUM CHLORIDE: 0.9 INJECTION, SOLUTION INTRAVENOUS at 05:08

## 2019-08-24 RX ADMIN — HYDROMORPHONE HYDROCHLORIDE 0.5 MG: 1 INJECTION, SOLUTION INTRAMUSCULAR; INTRAVENOUS; SUBCUTANEOUS at 09:08

## 2019-08-24 RX ADMIN — CYCLOBENZAPRINE HYDROCHLORIDE 5 MG: 5 TABLET, FILM COATED ORAL at 09:08

## 2019-08-24 RX ADMIN — GABAPENTIN 300 MG: 300 CAPSULE ORAL at 09:08

## 2019-08-24 RX ADMIN — TRAMADOL HYDROCHLORIDE 50 MG: 50 TABLET, FILM COATED ORAL at 06:08

## 2019-08-24 RX ADMIN — POTASSIUM CHLORIDE 10 MEQ: 7.46 INJECTION, SOLUTION INTRAVENOUS at 06:08

## 2019-08-24 RX ADMIN — SUCRALFATE 1 G: 1 SUSPENSION ORAL at 12:08

## 2019-08-24 RX ADMIN — SUCRALFATE 1 G: 1 SUSPENSION ORAL at 07:08

## 2019-08-24 RX ADMIN — METOCLOPRAMIDE 10 MG: 5 INJECTION, SOLUTION INTRAMUSCULAR; INTRAVENOUS at 05:08

## 2019-08-24 RX ADMIN — CYCLOBENZAPRINE HYDROCHLORIDE 5 MG: 5 TABLET, FILM COATED ORAL at 03:08

## 2019-08-24 RX ADMIN — ACETAMINOPHEN 999.01 MG: 650 SOLUTION ORAL at 06:08

## 2019-08-24 RX ADMIN — METOCLOPRAMIDE 10 MG: 5 INJECTION, SOLUTION INTRAMUSCULAR; INTRAVENOUS at 12:08

## 2019-08-24 RX ADMIN — HYDROMORPHONE HYDROCHLORIDE 0.5 MG: 1 INJECTION, SOLUTION INTRAMUSCULAR; INTRAVENOUS; SUBCUTANEOUS at 04:08

## 2019-08-24 RX ADMIN — SUCRALFATE 1 G: 1 SUSPENSION ORAL at 09:08

## 2019-08-24 RX ADMIN — TRAMADOL HYDROCHLORIDE 50 MG: 50 TABLET, FILM COATED ORAL at 12:08

## 2019-08-24 RX ADMIN — FAMOTIDINE 20 MG: 10 INJECTION, SOLUTION INTRAVENOUS at 09:08

## 2019-08-24 NOTE — PLAN OF CARE
Dr. De Los Santos informed H/H 6.8/22.3.  Order received to transfuse 1 unit PRBC. Bedside nurse Reyes notified

## 2019-08-24 NOTE — PROGRESS NOTES
Neuroendocrine Surgery  Progress Note    Hospital course: 43 y.o. woman transferred from OSH with abdominal pain and sepsis (GNR). 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  Hospital stay complicated by bilateral pulmonary embolisms. Patient reports having no issues with gastric bypass until episode of abdominal pain and resulting surgery last month. She now reports having a few days of abdominal pain with associated nausea/vomiting, fever/chills. Patient has been on TPN at home since discharge from hospital last month via PICC line. RUE PICC line removed yesterday due to suspected sepsis and new PICC placed to [facility]. TPN initiated on HD 2. Blood cultures grew multiresistant E coli, antibiotic therapy changed from zosyn to cefepime.     S:  Post-op 3  NAEON. Pain remains moderately controlled with PCA however when patient falls asleep she falls behind on pain control so she wakes up in pain.  No nausea/vomiting  No chest pain/SOB  Standing and sitting up for longer periods of time    O:  Temp:  [98 °F (36.7 °C)-100.1 °F (37.8 °C)] 100.1 °F (37.8 °C)  Pulse:  [] 101  Resp:  [16-20] 18  SpO2:  [99 %-100 %] 99 %  BP: (109-135)/(58-69) 112/60    Physical Exam:  Gen: Alert and oriented x3. Appears comfortable in bed  HEENT: normocephalic and atraumatic. EOMI. NG tube in place  Resp: Comfortable on room air  CV: Tachycardiac  Abd: Incision intact. Dressing on the left lower extremity clean dry and intact recently changed this AM, G-tube in place  Ext: warm and well perfused  MSK: normal range of motion, normal strength  Neuro: no focal deficits, normal sensation    I/O:  Intake/Output - Last 3 Shifts       08/22 0700 - 08/23 0659 08/23 0700 - 08/24 0659 08/24 0700 - 08/25 0659    P.O. 0 50     I.V. (mL/kg) 1492.9 (17.6)      Blood  231.3      Other       NG/ 60     IV Piggyback 450 300     TPN 1202.5 360     Total Intake(mL/kg) 3445.4 (40.7) 1001.3 (11.7)     Urine (mL/kg/hr) 1825 (0.9) 1750 (0.9) 750 (1.8)    Drains 610 500     Stool 0 0     Total Output 2435 2250 750    Net +1010.4 -1248.8 -750           Stool Occurrence 0 x 0 x         CBC:   Recent Labs     08/22/19  0546 08/23/19  0536 08/24/19  0611   WBC 20.25* 14.13* 11.51   HGB 7.3* 5.4* 6.8*   HCT 24.4* 17.8* 22.3*   * 353* 324     CMP:  Recent Labs     08/22/19  0420 08/23/19  0536 08/24/19  0611   * 136 139   K 4.2 4.1 3.7    107 108   CO2 21* 23 25   * 118* 115*   BUN 31* 49* 18   CREATININE 0.9 0.7 0.5   CALCIUM 7.9* 8.5* 8.3*   BILITOT 0.3 0.2 0.3   ALKPHOS 89 80 77   AST 34 29 20   ALT 38 27 22   ANIONGAP 8 6* 6*     Recent Labs     08/22/19  0420 08/23/19  0536 08/24/19  0611   MG 1.6 2.6 1.8   PHOS 4.1 2.6* 2.4*       Micro:   Microbiology Results (last 7 days)     ** No results found for the last 168 hours. **        A/P: 44 y.o. female admitted 8/8/2019 with history of eddie en Y gastric bypass 13 years ago, DM, & small bowel volvulus s/p ex lap with extensive small bowel resection and end jejunostomy approx 1 month PTA POD #2 from ex -lap with esophagogastrostomy, jejuno-jejunostomy, appendectomy.     Currently NPO, ok for CLD   CT of Abdomen with contrast showed no area of concern for anastomotic leak. Large area of subcutaneous fluid collection likely hematoma. Will attempt to drain and open area at bedside today   Continue scheduled Gabapentin and Flexeril. Added scheduled tylenol and ultram with PRN oxycodone and dilaudid.   H/H: 6.8/22.3. Critical that patient received blood transfusion as patient is POD #2, tachycardiac, and unable to tolerate sitting and standing.   Continue IV Iron.   Continue Cefepime and Fluconazole with end date of 8/24 per ID   Continue TPN and Lipids   Electrolyte replacement PRN    Dispo: Pending return of bowel  function and adequate pain control post surgery, improvement in anemia    Myles De Los Santos MD   [employer] General Surgery, PGY-2  08/24/2019

## 2019-08-24 NOTE — NURSING
Dr llamas removed large hematoma  approx 200 ml volume from left abd incison .   Incision sutures removed prior to evacuation of hematoma   Lidocaine 1% local given per MD Dilaudid 0.5mg Iv given    Resutured loosely per md following evacuation  Tolerated well

## 2019-08-24 NOTE — PLAN OF CARE
Problem: Adult Inpatient Plan of Care  Goal: Plan of Care Review  Outcome: Ongoing (interventions implemented as appropriate)     08/24/19 1477   Plan of Care Review   Plan of Care Reviewed With patient;spouse   Patient  is AAO X 4. Vital signs stable except the heart rate. On tele monitor shows Sinus Tachycardia. Remains NPO except oral medications. PCA pump in use. TPN, IV fluids and IV antibiotics given as per MAR On O2 2 litres per minute via nasal cannula. G Tube dependent drainage with NIL  Output. Used bedside commode during night. Dressing clean, dry and intact. Voided freely. All basic needs are met. Will continue to monitor patient.

## 2019-08-24 NOTE — NURSING
PCA pump was renewed at the beginning of the shift around 19:30 pm. Before 11 pm 75% of the syringe has been used. She kept asking for some more pain medication such as Tramadol or Ketorolac. Explained to her that as she is using PCA all other analgesia are discontinued. She called for pain medication around 11:20 pm. Went to find out the pain score, the type  and the location of pain.  Did not wake up even after knocking the door and calling out her name. Stood there for few minutes, the room light is on and she did not wake up. She appears fast asleep.

## 2019-08-24 NOTE — PLAN OF CARE
Problem: Adult Inpatient Plan of Care  Goal: Plan of Care Review  Outcome: Revised  Patient is awake and alert.  Up with assist to bedside commode chair.  Voids without any difficulty.  Has complaints of pain and is relieved by PCA Dilaudid pump.  Patient received two units of PRBCs this shift without any complications.  Patient continues to receive IVF, TPN, and antibiotics.  NGT discontinued this shift.  G tube in place and patent draining red tinged (had Lozenges for sore throat from NGT).  Dressing changed to LUQ abdomen per MD's order.   is at bedside and is supportive.  Safety is maintained with bed low, wheels locked and side rails up. Call light within reach.  Will continue to monitor.

## 2019-08-25 LAB
ALBUMIN SERPL BCP-MCNC: 1.9 G/DL (ref 3.5–5.2)
ALP SERPL-CCNC: 87 U/L (ref 55–135)
ALT SERPL W/O P-5'-P-CCNC: 21 U/L (ref 10–44)
ANION GAP SERPL CALC-SCNC: 8 MMOL/L (ref 8–16)
AST SERPL-CCNC: 16 U/L (ref 10–40)
BASOPHILS # BLD AUTO: 0.03 K/UL (ref 0–0.2)
BASOPHILS NFR BLD: 0.2 % (ref 0–1.9)
BILIRUB SERPL-MCNC: 0.4 MG/DL (ref 0.1–1)
BUN SERPL-MCNC: 13 MG/DL (ref 6–20)
CALCIUM SERPL-MCNC: 8.4 MG/DL (ref 8.7–10.5)
CHLORIDE SERPL-SCNC: 105 MMOL/L (ref 95–110)
CO2 SERPL-SCNC: 23 MMOL/L (ref 23–29)
CREAT SERPL-MCNC: 0.5 MG/DL (ref 0.5–1.4)
DIFFERENTIAL METHOD: ABNORMAL
EOSINOPHIL # BLD AUTO: 0.3 K/UL (ref 0–0.5)
EOSINOPHIL NFR BLD: 1.8 % (ref 0–8)
ERYTHROCYTE [DISTWIDTH] IN BLOOD BY AUTOMATED COUNT: 17.4 % (ref 11.5–14.5)
EST. GFR  (AFRICAN AMERICAN): >60 ML/MIN/1.73 M^2
EST. GFR  (NON AFRICAN AMERICAN): >60 ML/MIN/1.73 M^2
GLUCOSE SERPL-MCNC: 150 MG/DL (ref 70–110)
HCT VFR BLD AUTO: 27.2 % (ref 37–48.5)
HGB BLD-MCNC: 8.5 G/DL (ref 12–16)
LYMPHOCYTES # BLD AUTO: 0.9 K/UL (ref 1–4.8)
LYMPHOCYTES NFR BLD: 4.5 % (ref 18–48)
MAGNESIUM SERPL-MCNC: 1.5 MG/DL (ref 1.6–2.6)
MCH RBC QN AUTO: 25.4 PG (ref 27–31)
MCHC RBC AUTO-ENTMCNC: 31.3 G/DL (ref 32–36)
MCV RBC AUTO: 81 FL (ref 82–98)
MONOCYTES # BLD AUTO: 1.9 K/UL (ref 0.3–1)
MONOCYTES NFR BLD: 9.8 % (ref 4–15)
NEUTROPHILS # BLD AUTO: 15.9 K/UL (ref 1.8–7.7)
NEUTROPHILS NFR BLD: 83.7 % (ref 38–73)
PHOSPHATE SERPL-MCNC: 2.8 MG/DL (ref 2.7–4.5)
PLATELET # BLD AUTO: 338 K/UL (ref 150–350)
PMV BLD AUTO: 8.5 FL (ref 9.2–12.9)
POCT GLUCOSE: 101 MG/DL (ref 70–110)
POCT GLUCOSE: 134 MG/DL (ref 70–110)
POCT GLUCOSE: 135 MG/DL (ref 70–110)
POCT GLUCOSE: 148 MG/DL (ref 70–110)
POTASSIUM SERPL-SCNC: 3.4 MMOL/L (ref 3.5–5.1)
PROT SERPL-MCNC: 5.2 G/DL (ref 6–8.4)
RBC # BLD AUTO: 3.34 M/UL (ref 4–5.4)
SODIUM SERPL-SCNC: 136 MMOL/L (ref 136–145)
WBC # BLD AUTO: 19.14 K/UL (ref 3.9–12.7)

## 2019-08-25 PROCEDURE — 94799 UNLISTED PULMONARY SVC/PX: CPT

## 2019-08-25 PROCEDURE — 63600175 PHARM REV CODE 636 W HCPCS: Performed by: STUDENT IN AN ORGANIZED HEALTH CARE EDUCATION/TRAINING PROGRAM

## 2019-08-25 PROCEDURE — 25000003 PHARM REV CODE 250: Performed by: STUDENT IN AN ORGANIZED HEALTH CARE EDUCATION/TRAINING PROGRAM

## 2019-08-25 PROCEDURE — S0028 INJECTION, FAMOTIDINE, 20 MG: HCPCS | Performed by: SURGERY

## 2019-08-25 PROCEDURE — 83735 ASSAY OF MAGNESIUM: CPT

## 2019-08-25 PROCEDURE — A4217 STERILE WATER/SALINE, 500 ML: HCPCS | Performed by: SURGERY

## 2019-08-25 PROCEDURE — 94770 HC EXHALED C02 TEST: CPT

## 2019-08-25 PROCEDURE — 80053 COMPREHEN METABOLIC PANEL: CPT

## 2019-08-25 PROCEDURE — B4185 PARENTERAL SOL 10 GM LIPIDS: HCPCS | Performed by: SURGERY

## 2019-08-25 PROCEDURE — 94761 N-INVAS EAR/PLS OXIMETRY MLT: CPT

## 2019-08-25 PROCEDURE — 25000003 PHARM REV CODE 250: Performed by: SURGERY

## 2019-08-25 PROCEDURE — 63600175 PHARM REV CODE 636 W HCPCS: Performed by: SURGERY

## 2019-08-25 PROCEDURE — 84100 ASSAY OF PHOSPHORUS: CPT

## 2019-08-25 PROCEDURE — 21400001 HC TELEMETRY ROOM

## 2019-08-25 PROCEDURE — 99900035 HC TECH TIME PER 15 MIN (STAT)

## 2019-08-25 PROCEDURE — 85025 COMPLETE CBC W/AUTO DIFF WBC: CPT

## 2019-08-25 RX ORDER — HYDROMORPHONE HCL IN 0.9% NACL 6 MG/30 ML
PATIENT CONTROLLED ANALGESIA SYRINGE INTRAVENOUS CONTINUOUS
Status: DISCONTINUED | OUTPATIENT
Start: 2019-08-25 | End: 2019-08-28

## 2019-08-25 RX ORDER — NALOXONE HCL 0.4 MG/ML
0.02 VIAL (ML) INJECTION
Status: DISCONTINUED | OUTPATIENT
Start: 2019-08-25 | End: 2019-09-01 | Stop reason: HOSPADM

## 2019-08-25 RX ORDER — POTASSIUM CHLORIDE 14.9 MG/ML
40 INJECTION INTRAVENOUS ONCE
Status: DISCONTINUED | OUTPATIENT
Start: 2019-08-25 | End: 2019-08-25

## 2019-08-25 RX ORDER — BISACODYL 10 MG
10 SUPPOSITORY, RECTAL RECTAL DAILY
Status: DISCONTINUED | OUTPATIENT
Start: 2019-08-25 | End: 2019-09-01 | Stop reason: HOSPADM

## 2019-08-25 RX ORDER — HYDROMORPHONE HYDROCHLORIDE 1 MG/ML
0.5 INJECTION, SOLUTION INTRAMUSCULAR; INTRAVENOUS; SUBCUTANEOUS
Status: DISCONTINUED | OUTPATIENT
Start: 2019-08-25 | End: 2019-08-25

## 2019-08-25 RX ORDER — POTASSIUM CHLORIDE 7.45 MG/ML
10 INJECTION INTRAVENOUS
Status: COMPLETED | OUTPATIENT
Start: 2019-08-25 | End: 2019-08-26

## 2019-08-25 RX ORDER — MAGNESIUM SULFATE HEPTAHYDRATE 40 MG/ML
2 INJECTION, SOLUTION INTRAVENOUS ONCE
Status: COMPLETED | OUTPATIENT
Start: 2019-08-25 | End: 2019-08-25

## 2019-08-25 RX ADMIN — CEFEPIME HYDROCHLORIDE 2 G: 2 INJECTION, SOLUTION INTRAVENOUS at 10:08

## 2019-08-25 RX ADMIN — METOCLOPRAMIDE 10 MG: 5 INJECTION, SOLUTION INTRAMUSCULAR; INTRAVENOUS at 11:08

## 2019-08-25 RX ADMIN — MAGNESIUM SULFATE IN WATER 2 G: 40 INJECTION, SOLUTION INTRAVENOUS at 06:08

## 2019-08-25 RX ADMIN — IRON SUCROSE 100 MG: 20 INJECTION, SOLUTION INTRAVENOUS at 08:08

## 2019-08-25 RX ADMIN — CEFEPIME HYDROCHLORIDE 2 G: 2 INJECTION, SOLUTION INTRAVENOUS at 12:08

## 2019-08-25 RX ADMIN — CYCLOBENZAPRINE HYDROCHLORIDE 5 MG: 5 TABLET, FILM COATED ORAL at 02:08

## 2019-08-25 RX ADMIN — I.V. FAT EMULSION 250 ML: 20 EMULSION INTRAVENOUS at 09:08

## 2019-08-25 RX ADMIN — ACETAMINOPHEN 999.01 MG: 650 SOLUTION ORAL at 06:08

## 2019-08-25 RX ADMIN — POTASSIUM CHLORIDE 10 MEQ: 7.46 INJECTION, SOLUTION INTRAVENOUS at 08:08

## 2019-08-25 RX ADMIN — CYANOCOBALAMIN 1000 MCG: 1000 INJECTION, SOLUTION INTRAMUSCULAR; SUBCUTANEOUS at 08:08

## 2019-08-25 RX ADMIN — METOCLOPRAMIDE 10 MG: 5 INJECTION, SOLUTION INTRAMUSCULAR; INTRAVENOUS at 12:08

## 2019-08-25 RX ADMIN — GABAPENTIN 300 MG: 300 CAPSULE ORAL at 02:08

## 2019-08-25 RX ADMIN — HYDROMORPHONE HYDROCHLORIDE 0.5 MG: 1 INJECTION, SOLUTION INTRAMUSCULAR; INTRAVENOUS; SUBCUTANEOUS at 07:08

## 2019-08-25 RX ADMIN — HYDROMORPHONE HYDROCHLORIDE 0.5 MG: 1 INJECTION, SOLUTION INTRAMUSCULAR; INTRAVENOUS; SUBCUTANEOUS at 06:08

## 2019-08-25 RX ADMIN — TRAMADOL HYDROCHLORIDE 50 MG: 50 TABLET, FILM COATED ORAL at 05:08

## 2019-08-25 RX ADMIN — MICONAZOLE NITRATE: 20 POWDER TOPICAL at 09:08

## 2019-08-25 RX ADMIN — POTASSIUM CHLORIDE 10 MEQ: 7.46 INJECTION, SOLUTION INTRAVENOUS at 10:08

## 2019-08-25 RX ADMIN — FAMOTIDINE 20 MG: 10 INJECTION, SOLUTION INTRAVENOUS at 08:08

## 2019-08-25 RX ADMIN — SODIUM PHOSPHATE, DIBASIC AND SODIUM PHOSPHATE, MONOBASIC 1 ENEMA: 7; 19 ENEMA RECTAL at 02:08

## 2019-08-25 RX ADMIN — Medication 10 MG: at 11:08

## 2019-08-25 RX ADMIN — SODIUM CHLORIDE: 234 INJECTION INTRAMUSCULAR; INTRAVENOUS; SUBCUTANEOUS at 05:08

## 2019-08-25 RX ADMIN — TRAMADOL HYDROCHLORIDE 50 MG: 50 TABLET, FILM COATED ORAL at 12:08

## 2019-08-25 RX ADMIN — CYCLOBENZAPRINE HYDROCHLORIDE 5 MG: 5 TABLET, FILM COATED ORAL at 08:08

## 2019-08-25 RX ADMIN — POTASSIUM CHLORIDE 10 MEQ: 7.46 INJECTION, SOLUTION INTRAVENOUS at 11:08

## 2019-08-25 RX ADMIN — SODIUM CHLORIDE: 0.9 INJECTION, SOLUTION INTRAVENOUS at 11:08

## 2019-08-25 RX ADMIN — OXYCODONE HYDROCHLORIDE 10 MG: 5 TABLET ORAL at 02:08

## 2019-08-25 RX ADMIN — SUCRALFATE 1 G: 1 SUSPENSION ORAL at 11:08

## 2019-08-25 RX ADMIN — FAMOTIDINE 20 MG: 10 INJECTION, SOLUTION INTRAVENOUS at 09:08

## 2019-08-25 RX ADMIN — CEFEPIME HYDROCHLORIDE 2 G: 2 INJECTION, SOLUTION INTRAVENOUS at 06:08

## 2019-08-25 RX ADMIN — GABAPENTIN 300 MG: 300 CAPSULE ORAL at 08:08

## 2019-08-25 RX ADMIN — SUCRALFATE 1 G: 1 SUSPENSION ORAL at 05:08

## 2019-08-25 RX ADMIN — ACETAMINOPHEN 999.01 MG: 650 SOLUTION ORAL at 12:08

## 2019-08-25 RX ADMIN — HYDROMORPHONE HYDROCHLORIDE 0.5 MG: 1 INJECTION, SOLUTION INTRAMUSCULAR; INTRAVENOUS; SUBCUTANEOUS at 01:08

## 2019-08-25 RX ADMIN — FLUCONAZOLE 200 MG: 2 INJECTION, SOLUTION INTRAVENOUS at 01:08

## 2019-08-25 RX ADMIN — HYDROMORPHONE HYDROCHLORIDE 0.5 MG: 1 INJECTION, SOLUTION INTRAMUSCULAR; INTRAVENOUS; SUBCUTANEOUS at 04:08

## 2019-08-25 RX ADMIN — METOCLOPRAMIDE 10 MG: 5 INJECTION, SOLUTION INTRAMUSCULAR; INTRAVENOUS at 06:08

## 2019-08-25 RX ADMIN — TRAMADOL HYDROCHLORIDE 50 MG: 50 TABLET, FILM COATED ORAL at 06:08

## 2019-08-25 RX ADMIN — Medication: at 08:08

## 2019-08-25 RX ADMIN — SUCRALFATE 1 G: 1 SUSPENSION ORAL at 08:08

## 2019-08-25 RX ADMIN — HYDROMORPHONE HYDROCHLORIDE 0.5 MG: 1 INJECTION, SOLUTION INTRAMUSCULAR; INTRAVENOUS; SUBCUTANEOUS at 11:08

## 2019-08-25 RX ADMIN — OXYCODONE HYDROCHLORIDE 10 MG: 5 TABLET ORAL at 04:08

## 2019-08-25 RX ADMIN — MICONAZOLE NITRATE: 20 POWDER TOPICAL at 08:08

## 2019-08-25 RX ADMIN — CEFEPIME HYDROCHLORIDE 2 G: 2 INJECTION, SOLUTION INTRAVENOUS at 02:08

## 2019-08-25 RX ADMIN — METOCLOPRAMIDE 10 MG: 5 INJECTION, SOLUTION INTRAMUSCULAR; INTRAVENOUS at 05:08

## 2019-08-25 RX ADMIN — TRAMADOL HYDROCHLORIDE 50 MG: 50 TABLET, FILM COATED ORAL at 11:08

## 2019-08-25 NOTE — PROGRESS NOTES
Neuroendocrine Surgery  Progress Note    Hospital course: 43 y.o. woman transferred from OSH with abdominal pain and sepsis (GNR). 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  Hospital stay complicated by bilateral subsegmental pulmonary embolisms. Now s/p eddie-en-y gastric bypass takedown, jejunostomy take down and reanastomosis with reestablishment of gastrointestinal continuity.    S:  Post-op 4  NAEON. Pain remains moderately controlled with new pain regimen. Abdominal subcutaneous hematoma evacuated yesterday at bedside with 200cc old hematoma removed. Abdominal pain this morning   No nausea/vomiting  No chest pain/SOB  Standing and sitting up for longer periods of time    O:  Temp:  [98 °F (36.7 °C)-101.3 °F (38.5 °C)] 98 °F (36.7 °C)  Pulse:  [] 105  Resp:  [16-24] 18  SpO2:  [95 %-99 %] 96 %  BP: (103-130)/(53-80) 130/79    Physical Exam:  Gen: Alert and oriented x3. Appears mildly uncomfortable in bed  HEENT: normocephalic and atraumatic. EOMI. NG tube in place  Resp: Comfortable on room air  CV: Tachycardiac  Abd: Incision intact. Dressing on the left lower extremity clean dry and intact recently changed this AM, G-tube in place  Ext: warm and well perfused  MSK: normal range of motion, normal strength  Neuro: no focal deficits, normal sensation    I/O:  Intake/Output - Last 3 Shifts       08/23 0700 - 08/24 0659 08/24 0700 - 08/25 0659 08/25 0700 - 08/26 0659    P.O. 50 745     I.V. (mL/kg)  1379.5 (16)     Blood 231.3 350     NG/GT 60 60     IV Piggyback 300 550      900     Total Intake(mL/kg) 1001.3 (11.7) 3984.5 (46.3)     Urine (mL/kg/hr) 1750 (0.9) 1950 (0.9) 300 (1.3)    Drains 500 200     Stool 0 0     Total Output 2250 2150 300    Net -1248.8 +1834.5 -300           Stool Occurrence 0 x 0 x         CBC:    Recent Labs     08/23/19  0536 08/24/19  0611 08/25/19  0643   WBC 14.13* 11.51 19.14*   HGB 5.4* 6.8* 8.5*   HCT 17.8* 22.3* 27.2*   * 324 338     CMP:  Recent Labs     08/23/19  0536 08/24/19  0611 08/25/19  0643    139 136   K 4.1 3.7 3.4*    108 105   CO2 23 25 23   * 115* 150*   BUN 49* 18 13   CREATININE 0.7 0.5 0.5   CALCIUM 8.5* 8.3* 8.4*   BILITOT 0.2 0.3 0.4   ALKPHOS 80 77 87   AST 29 20 16   ALT 27 22 21   ANIONGAP 6* 6* 8     Recent Labs     08/23/19  0536 08/24/19  0611 08/25/19  0643   MG 2.6 1.8 1.5*   PHOS 2.6* 2.4* 2.8       Micro:   Microbiology Results (last 7 days)     ** No results found for the last 168 hours. **        A/P: 44 y.o. female admitted 8/8/2019 with history of eddie en Y gastric bypass 13 years ago, DM, & small bowel volvulus s/p ex lap with extensive small bowel resection and end jejunostomy approx 1 month PTA from ex -lap with esophagogastrostomy, jejuno-jejunostomy, appendectomy.     NPO   STAT KUB and CT abd/pelvis (no contrast) to evaluate for possible leak   Continue scheduled Gabapentin and Flexeril. Added scheduled tylenol and ultram with PRN oxycodone and dilaudid.   H/H stable this morning   Continue Cefepime and Fluconazole with end date of 8/24 per ID   Continue TPN and Lipids   Electrolyte replacement PRN    Myles De Los Santos MD   [employer] General Surgery, PGY-2  08/25/2019

## 2019-08-25 NOTE — PLAN OF CARE
Problem: Adult Inpatient Plan of Care  Goal: Plan of Care Review  Outcome: Ongoing (interventions implemented as appropriate)  Received one unit of rbc today  Prior to administration temp up to 100.4  Temp viviane to 101.3 (less than 1.5 degrees)    Physician notified  And blood completed    Pt at time of blood initiation had undergone evacuation of hematoma as documented in notes  No oozing from dressing applied to newly sutured closure of incision.  Tolerating diet  Over all feeling better at shift change   No nausea reported this shift  No flatus   Pain control method changed this shift  Pt pain level hovers at 8 .  Pt is encouraged that pca has been stopped and other pain control methods are in place    Ambulated around unit twice   tpn continues at ordered rate   gastic tube irrigated without resistance    Little out put until after irrigated   Telemetry sinus rhythm at rest   Tachycardia as much as 130's with activity

## 2019-08-25 NOTE — PLAN OF CARE
VN rounds:  VN cued into pt's room with pt's permission.  Pt resting in bed sitting in recliner with non-skid socks, call bell in lap and family at bedside.   Fall risk protocol discussed with pt.  VN instructed to call for assistance.  Pt aware and agreeable.  Pt sad, awaiting test results having increased abdominal pain, meds adjusted.   Emotional support provided to pt. No acute distress noted.  Allowed time for questions.  Will continue to be available and intervene as needed.

## 2019-08-25 NOTE — PLAN OF CARE
Problem: Adult Inpatient Plan of Care  Goal: Plan of Care Review  Outcome: Ongoing (interventions implemented as appropriate)  Patient AAOx4, VSS, Blood glucose monitored. Patient tolerating activity, up to chair and ambulating in room with standby assistance. Patient free from falls. Foam dressing on sacrum, skin intact. Patient c/o pain, PRN oxycodone 10mg and Hydromorphone IV 0.5 MG given per MD order. Suppository and enema given per MD order. Call bell in reach. Bed alarm in place. Safety maintained. Family at bedisde. Will  continue to monitor.     Problem: Infection  Goal: Infection Symptom Resolution  Outcome: Ongoing (interventions implemented as appropriate)       Problem: Malnutrition  Goal: Improved Nutritional Intake  Outcome: Ongoing (interventions implemented as appropriate)       Problem: Pain Acute  Goal: Optimal Pain Control  Outcome: Ongoing (interventions implemented as appropriate)       Problem: Fall Injury Risk  Goal: Absence of Fall and Fall-Related Injury  Outcome: Ongoing (interventions implemented as appropriate)       Problem: Nausea and Vomiting  Goal: Fluid and Electrolyte Balance  Outcome: Ongoing (interventions implemented as appropriate)       Problem: Skin Injury Risk Increased  Goal: Skin Health and Integrity  Outcome: Ongoing (interventions implemented as appropriate)

## 2019-08-25 NOTE — PLAN OF CARE
Problem: Adult Inpatient Plan of Care  Goal: Plan of Care Review  Outcome: Ongoing (interventions implemented as appropriate)     08/25/19 6126   Plan of Care Review   Plan of Care Reviewed With patient;spouse   Patient  appears Drowsy, awakens for pain IV Dilaudid every 4 hours. Vital signs stable. On tele monitor shows Sinus Tahycardia. C/O pain all the time. IV fluids ,TPN and IV antibiotics are infusing according to MAR. G - Tube has been clamped. Tolerating clear liquids. No episode of nausea or vomiting. Voided freely. Slept will during night. Spouse at bedside. Safety measures maintained. Will continue to monitor patient.

## 2019-08-26 LAB
ALBUMIN SERPL BCP-MCNC: 1.8 G/DL (ref 3.5–5.2)
ALP SERPL-CCNC: 309 U/L (ref 55–135)
ALT SERPL W/O P-5'-P-CCNC: 51 U/L (ref 10–44)
ANION GAP SERPL CALC-SCNC: 6 MMOL/L (ref 8–16)
ANISOCYTOSIS BLD QL SMEAR: SLIGHT
AST SERPL-CCNC: 53 U/L (ref 10–40)
BASOPHILS # BLD AUTO: 0.04 K/UL (ref 0–0.2)
BASOPHILS NFR BLD: 0.2 % (ref 0–1.9)
BILIRUB SERPL-MCNC: 0.3 MG/DL (ref 0.1–1)
BUN SERPL-MCNC: 12 MG/DL (ref 6–20)
CALCIUM SERPL-MCNC: 8.5 MG/DL (ref 8.7–10.5)
CHLORIDE SERPL-SCNC: 104 MMOL/L (ref 95–110)
CO2 SERPL-SCNC: 26 MMOL/L (ref 23–29)
CREAT SERPL-MCNC: 0.5 MG/DL (ref 0.5–1.4)
DIFFERENTIAL METHOD: ABNORMAL
EOSINOPHIL # BLD AUTO: 0.6 K/UL (ref 0–0.5)
EOSINOPHIL NFR BLD: 2.6 % (ref 0–8)
ERYTHROCYTE [DISTWIDTH] IN BLOOD BY AUTOMATED COUNT: 17.7 % (ref 11.5–14.5)
EST. GFR  (AFRICAN AMERICAN): >60 ML/MIN/1.73 M^2
EST. GFR  (NON AFRICAN AMERICAN): >60 ML/MIN/1.73 M^2
GLUCOSE SERPL-MCNC: 124 MG/DL (ref 70–110)
HCT VFR BLD AUTO: 27.3 % (ref 37–48.5)
HGB BLD-MCNC: 8.5 G/DL (ref 12–16)
HYPOCHROMIA BLD QL SMEAR: ABNORMAL
LYMPHOCYTES # BLD AUTO: 1.2 K/UL (ref 1–4.8)
LYMPHOCYTES NFR BLD: 5.6 % (ref 18–48)
MAGNESIUM SERPL-MCNC: 1.7 MG/DL (ref 1.6–2.6)
MCH RBC QN AUTO: 25.3 PG (ref 27–31)
MCHC RBC AUTO-ENTMCNC: 31.1 G/DL (ref 32–36)
MCV RBC AUTO: 81 FL (ref 82–98)
MONOCYTES # BLD AUTO: 2.6 K/UL (ref 0.3–1)
MONOCYTES NFR BLD: 12.2 % (ref 4–15)
NEUTROPHILS # BLD AUTO: 16.9 K/UL (ref 1.8–7.7)
NEUTROPHILS NFR BLD: 79.4 % (ref 38–73)
PHOSPHATE SERPL-MCNC: 3.4 MG/DL (ref 2.7–4.5)
PLATELET # BLD AUTO: 425 K/UL (ref 150–350)
PLATELET BLD QL SMEAR: ABNORMAL
PMV BLD AUTO: 9.1 FL (ref 9.2–12.9)
POCT GLUCOSE: 120 MG/DL (ref 70–110)
POCT GLUCOSE: 121 MG/DL (ref 70–110)
POCT GLUCOSE: 130 MG/DL (ref 70–110)
POCT GLUCOSE: 138 MG/DL (ref 70–110)
POTASSIUM SERPL-SCNC: 3.4 MMOL/L (ref 3.5–5.1)
PROT SERPL-MCNC: 5.3 G/DL (ref 6–8.4)
RBC # BLD AUTO: 3.36 M/UL (ref 4–5.4)
SODIUM SERPL-SCNC: 136 MMOL/L (ref 136–145)
TRIGL SERPL-MCNC: 142 MG/DL (ref 30–150)
WBC # BLD AUTO: 21.72 K/UL (ref 3.9–12.7)

## 2019-08-26 PROCEDURE — 63600175 PHARM REV CODE 636 W HCPCS: Performed by: STUDENT IN AN ORGANIZED HEALTH CARE EDUCATION/TRAINING PROGRAM

## 2019-08-26 PROCEDURE — S0028 INJECTION, FAMOTIDINE, 20 MG: HCPCS | Performed by: SURGERY

## 2019-08-26 PROCEDURE — 21400001 HC TELEMETRY ROOM

## 2019-08-26 PROCEDURE — 99900035 HC TECH TIME PER 15 MIN (STAT)

## 2019-08-26 PROCEDURE — 84478 ASSAY OF TRIGLYCERIDES: CPT

## 2019-08-26 PROCEDURE — 83735 ASSAY OF MAGNESIUM: CPT

## 2019-08-26 PROCEDURE — 85025 COMPLETE CBC W/AUTO DIFF WBC: CPT

## 2019-08-26 PROCEDURE — A4217 STERILE WATER/SALINE, 500 ML: HCPCS | Performed by: SURGERY

## 2019-08-26 PROCEDURE — 84100 ASSAY OF PHOSPHORUS: CPT

## 2019-08-26 PROCEDURE — 63600175 PHARM REV CODE 636 W HCPCS: Performed by: SURGERY

## 2019-08-26 PROCEDURE — B4185 PARENTERAL SOL 10 GM LIPIDS: HCPCS | Performed by: SURGERY

## 2019-08-26 PROCEDURE — 25000003 PHARM REV CODE 250: Performed by: SURGERY

## 2019-08-26 PROCEDURE — 80053 COMPREHEN METABOLIC PANEL: CPT

## 2019-08-26 PROCEDURE — 25000003 PHARM REV CODE 250: Performed by: STUDENT IN AN ORGANIZED HEALTH CARE EDUCATION/TRAINING PROGRAM

## 2019-08-26 PROCEDURE — 97803 MED NUTRITION INDIV SUBSEQ: CPT

## 2019-08-26 PROCEDURE — 94799 UNLISTED PULMONARY SVC/PX: CPT

## 2019-08-26 PROCEDURE — 94770 HC EXHALED C02 TEST: CPT

## 2019-08-26 PROCEDURE — 94761 N-INVAS EAR/PLS OXIMETRY MLT: CPT

## 2019-08-26 RX ORDER — POTASSIUM CHLORIDE 14.9 MG/ML
20 INJECTION INTRAVENOUS
Status: COMPLETED | OUTPATIENT
Start: 2019-08-26 | End: 2019-08-26

## 2019-08-26 RX ORDER — ACETAMINOPHEN 650 MG/1
650 SUPPOSITORY RECTAL EVERY 6 HOURS SCHEDULED
Status: DISCONTINUED | OUTPATIENT
Start: 2019-08-26 | End: 2019-08-29

## 2019-08-26 RX ORDER — BUMETANIDE 0.25 MG/ML
0.5 INJECTION INTRAMUSCULAR; INTRAVENOUS DAILY
Status: DISCONTINUED | OUTPATIENT
Start: 2019-08-27 | End: 2019-08-28

## 2019-08-26 RX ORDER — ENOXAPARIN SODIUM 100 MG/ML
40 INJECTION SUBCUTANEOUS
Status: DISCONTINUED | OUTPATIENT
Start: 2019-08-26 | End: 2019-09-01 | Stop reason: HOSPADM

## 2019-08-26 RX ORDER — MAGNESIUM SULFATE HEPTAHYDRATE 40 MG/ML
2 INJECTION, SOLUTION INTRAVENOUS ONCE
Status: COMPLETED | OUTPATIENT
Start: 2019-08-26 | End: 2019-08-26

## 2019-08-26 RX ADMIN — Medication: at 02:08

## 2019-08-26 RX ADMIN — METOCLOPRAMIDE 10 MG: 5 INJECTION, SOLUTION INTRAMUSCULAR; INTRAVENOUS at 06:08

## 2019-08-26 RX ADMIN — IRON SUCROSE 100 MG: 20 INJECTION, SOLUTION INTRAVENOUS at 10:08

## 2019-08-26 RX ADMIN — CEFEPIME HYDROCHLORIDE 2 G: 2 INJECTION, SOLUTION INTRAVENOUS at 03:08

## 2019-08-26 RX ADMIN — ENOXAPARIN SODIUM 40 MG: 100 INJECTION SUBCUTANEOUS at 05:08

## 2019-08-26 RX ADMIN — ACETAMINOPHEN 650 MG: 650 SUPPOSITORY RECTAL at 06:08

## 2019-08-26 RX ADMIN — MAGNESIUM SULFATE IN WATER 2 G: 40 INJECTION, SOLUTION INTRAVENOUS at 09:08

## 2019-08-26 RX ADMIN — Medication: at 03:08

## 2019-08-26 RX ADMIN — SODIUM CHLORIDE: 234 INJECTION INTRAMUSCULAR; INTRAVENOUS; SUBCUTANEOUS at 05:08

## 2019-08-26 RX ADMIN — FLUCONAZOLE 200 MG: 2 INJECTION, SOLUTION INTRAVENOUS at 02:08

## 2019-08-26 RX ADMIN — CYANOCOBALAMIN 1000 MCG: 1000 INJECTION, SOLUTION INTRAMUSCULAR; SUBCUTANEOUS at 09:08

## 2019-08-26 RX ADMIN — MICONAZOLE NITRATE: 20 POWDER TOPICAL at 12:08

## 2019-08-26 RX ADMIN — CEFEPIME HYDROCHLORIDE 2 G: 2 INJECTION, SOLUTION INTRAVENOUS at 05:08

## 2019-08-26 RX ADMIN — Medication: at 09:08

## 2019-08-26 RX ADMIN — FAMOTIDINE 20 MG: 10 INJECTION, SOLUTION INTRAVENOUS at 10:08

## 2019-08-26 RX ADMIN — Medication: at 10:08

## 2019-08-26 RX ADMIN — POTASSIUM CHLORIDE 20 MEQ: 200 INJECTION, SOLUTION INTRAVENOUS at 09:08

## 2019-08-26 RX ADMIN — SODIUM CHLORIDE: 0.9 INJECTION, SOLUTION INTRAVENOUS at 10:08

## 2019-08-26 RX ADMIN — ACETAMINOPHEN 650 MG: 650 SUPPOSITORY RECTAL at 12:08

## 2019-08-26 RX ADMIN — I.V. FAT EMULSION 250 ML: 20 EMULSION INTRAVENOUS at 10:08

## 2019-08-26 RX ADMIN — POTASSIUM CHLORIDE 20 MEQ: 200 INJECTION, SOLUTION INTRAVENOUS at 12:08

## 2019-08-26 RX ADMIN — METOCLOPRAMIDE 10 MG: 5 INJECTION, SOLUTION INTRAMUSCULAR; INTRAVENOUS at 12:08

## 2019-08-26 RX ADMIN — POTASSIUM CHLORIDE 10 MEQ: 7.46 INJECTION, SOLUTION INTRAVENOUS at 12:08

## 2019-08-26 RX ADMIN — AZITHROMYCIN MONOHYDRATE 500 MG: 500 INJECTION, POWDER, LYOPHILIZED, FOR SOLUTION INTRAVENOUS at 12:08

## 2019-08-26 RX ADMIN — Medication 10 MG: at 09:08

## 2019-08-26 RX ADMIN — FAMOTIDINE 20 MG: 10 INJECTION, SOLUTION INTRAVENOUS at 09:08

## 2019-08-26 RX ADMIN — METOCLOPRAMIDE 10 MG: 5 INJECTION, SOLUTION INTRAMUSCULAR; INTRAVENOUS at 05:08

## 2019-08-26 RX ADMIN — SODIUM CHLORIDE: 0.9 INJECTION, SOLUTION INTRAVENOUS at 07:08

## 2019-08-26 NOTE — PROGRESS NOTES
Pharmacy New Medication Education    Patient and/or Caregiver ACCEPTED medication education.    Pharmacy has provided education on the name, indication, and possible side effects of the medication(s) prescribed, using teach-back method.     Learners of pharmacy medication education includes:  patient and spouse    Medication Indication Side Effects   azithromycin Respiratory infection diarrhea   cyclobenzaprine Muscle relaxant somnolence       The following medications have also been discussed, during this admission.     Current Facility-Administered Medications   Medication Frequency    0.9%  NaCl infusion (for blood administration) Q24H PRN    0.9%  NaCl infusion Continuous    acetaminophen suppository 650 mg 4 times per day    azithromycin 500 mg in dextrose 5 % 250 mL IVPB (ready to mix system) Q24H    bacitracin ointment 14 g TID PRN    bisacodyl suppository 10 mg Daily    ceFEPIme in dextrose 5% 2 gram/50 mL IVPB 2 g Q8H    cyanocobalamin injection 1,000 mcg Daily    cyclobenzaprine tablet 5 mg TID    dextrose 10% (D10W) Bolus PRN    dextrose 10% (D10W) Bolus PRN    diphenhydrAMINE capsule 25 mg Q6H PRN    enoxaparin injection 40 mg Q24H    famotidine (PF) injection 20 mg BID    fat emulsion 20% infusion 250 mL Every Mon, Wed, Fri    fluconazole (DIFLUCAN) IVPB 200 mg 100 mL Q24H    gabapentin capsule 300 mg TID    glucagon (human recombinant) injection 1 mg PRN    HYDROmorphone PCA in 0.9 % NaCl 6 Mg/30 mL (0.2 mg/mL) Continuous    insulin aspart U-100 pen 0-5 Units Q6H PRN    iron sucrose (VENOFER) 100 mg in sodium chloride 0.9% 100 mL IVPB Daily    metoclopramide HCl injection 10 mg Q6H    miconazole NITRATE 2 % top powder BID    naloxone 0.4 mg/mL injection 0.02 mg PRN    ondansetron injection 8 mg Q6H PRN    promethazine (PHENERGAN) 25 mg in dextrose 5 % 50 mL IVPB Q6H PRN    promethazine (PHENERGAN) 6.25 mg in dextrose 5 % 50 mL IVPB Q6H PRN    sodium chloride 0.9% flush 10 mL PRN    sucralfate 100 mg/mL  suspension 1 g QID (WM & HS)    TPN ADULT CENTRAL LINE CUSTOM Continuous    TPN ADULT CENTRAL LINE CUSTOM Continuous    zolpidem tablet 5 mg Nightly PRN          Thank you  Linda Giles, PharmD

## 2019-08-26 NOTE — PLAN OF CARE
Problem: Parenteral Nutrition  Goal: Effective Intravenous Nutrition Therapy Delivery  Outcome: Ongoing (interventions implemented as appropriate)  Recommendation:   1. Continue TPN with IVFE.   2. Initiate Clear Liquid diet when medically acceptable.    Goals:   1. Pt will tolerate TPN.   2. Pt will tolerate po diet with at least 50% intake at meals.  Nutrition Goal Status: progressing towards goal  Communication of RD Recs: reviewed with RN(Skylar)

## 2019-08-26 NOTE — PLAN OF CARE
Progress notes reviewed. VN cued into pt's room. Pt requested that VN come back later. Will attempt again as time allows.

## 2019-08-26 NOTE — PLAN OF CARE
Problem: Adult Inpatient Plan of Care  Goal: Plan of Care Review  Outcome: Ongoing (interventions implemented as appropriate)     08/26/19 0093   Plan of Care Review   Plan of Care Reviewed With patient;spouse   Patient Ms. Corbin is AAO X 4. Vital signs stable except heart rate. On tele monitor shows Sinus tachycardia. Pain score remains between 7-9/10.  PCA pump in use. Syringe renewed second time. Kept her NPO except  ice chip. Used bedside commode and voided freely. TPN, Lipids and IV fluids are infusing according to MAR. IV antibiotics given. Dressing changed last night. Remains dry and intact.Slept fairly well during night. Will continue to monitor patient.

## 2019-08-26 NOTE — PROGRESS NOTES
"[facility]  Adult Nutrition  Progress Note    SUMMARY       Recommendations    Recommendation:   1. Continue TPN with IVFE.   2. Initiate Clear Liquid diet when medically acceptable.    Goals:   1. Pt will tolerate TPN.   2. Pt will tolerate po diet with at least 50% intake at meals.  Nutrition Goal Status: progressing towards goal  Communication of RD Recs: reviewed with RN(Skylar)    Reason for Assessment  Reason For Assessment: RD follow-up  Diagnosis: (sepsis)  Relevant Medical History: DM, anxiety, ADD, gastric bypass, expl lap, cholecystectomy  General Information Comments: Pt NPO. Pt receiving custom TPN at 50ml/hr with IVFE 3xweekly. s/p partial gastrectomy & expl lap . NFPE completed 8/10-mild wasting of temples & clavicles.  Nutrition Discharge Planning: d/c diet to be determined    Nutrition Risk Screen  Nutrition Risk Screen: tube feeding or parenteral nutrition    Nutrition/Diet History  Food Preferences: no Latter-day or cultural food prefs identified  Spiritual, Cultural Beliefs, Mormonism Practices, Values that Affect Care: no  Factors Affecting Nutritional Intake: altered gastrointestinal function    Anthropometrics  Temp: 98.3 °F (36.8 °C)  Height Method: Stated  Height: 5' 5" (165.1 cm)  Height (inches): 65 in  Weight Method: Bed Scale  Weight: 90.5 kg (199 lb 8.3 oz)  Weight (lb): 199.52 lb  Ideal Body Weight (IBW), Female: 125 lb  % Ideal Body Weight, Female (lb): 134.74 lb  BMI (Calculated): 28.1  BMI Grade: 25 - 29.9 - overweight  Usual Body Weight (UBW), k.8 kg  % Usual Body Weight: 93.59  % Weight Change From Usual Weight: -6.6 %     Lab/Procedures/Meds  Pertinent Labs Reviewed: reviewed  Pertinent Labs Comments: K 3.4L, Glu 124H, Ca 8.5L, Alb 1.8L, PAB 14L  Pertinent Medications Reviewed: reviewed  Pertinent Medications Comments: diflucan, Reglan    Estimated/Assessed Needs  Weight Used For Calorie Calculations: 90.5 kg (199 lb 8.3 oz)  Energy Calorie " Requirements (kcal): 2069  Energy Need Method: [place](x1.4)  Protein Requirements: 90g (1.0g/kg)  Weight Used For Protein Calculations: 90.5 kg (199 lb 8.3 oz)  Estimated Fluid Requirement Method: RDA Method  RDA Method (mL): 2069     Nutrition Prescription Ordered  Current Diet Order: NPO  Current Nutrition Support Formula Ordered: (custom TPN)  Current Nutrition Support Rate Ordered: 50 (ml)  Current Nutrition Support Frequency Ordered: ml/hr  Oral Nutrition Supplement: IVFE 3x weekly    Evaluation of Received Nutrient/Fluid Intake  Parenteral Calories (kcal): 1282  Parenteral Protein (gm): 108  Parenteral Fluid (mL): 1200  Lipid Calories (kcals): 214 kcals  GIR (Glucose Infusion Rate) (mg/kg/min): 1.9 mg/kg/min  Total Calories (kcal): 1496  % Kcal Needs: 74  % Protein Needs: 120  I/O: 2252/1435  Energy Calories Required: not meeting needs  Protein Required: meeting needs  Fluid Required: meeting needs  Comments: LBM 8/21  % Intake of Estimated Energy Needs: 50 - 75 %  % Meal Intake: NPO    Nutrition Risk  Level of Risk/Frequency of Follow-up: (2xweekly)     Assessment and Plan  Protein calorie malnutrition  Malnutrition in the context of Acute Illness/Injury    Related to (etiology):  S/p surgery/sepsis    Signs and Symptoms (as evidenced by):  Energy Intake: <50% of estimated energy requirement for 1 month  Muscle Mass Depletion: mild depletion of temples, clavicle region and scapular region   Weight Loss: 6% x 1 month     Interventions:  Parenteral nutrition  Commercial beverage    Nutrition Diagnosis Status:  Continues       Monitor and Evaluation  Food and Nutrient Intake: food and beverage intake, parenteral nutrition intake  Food and Nutrient Adminstration: diet order, enteral and parenteral nutrition administration  Physical Activity and Function: nutrition-related ADLs and IADLs  Anthropometric Measurements: weight  Biochemical Data, Medical Tests and Procedures: electrolyte and renal  panel  Nutrition-Focused Physical Findings: overall appearance     Malnutrition Assessment  Malnutrition Type: acute illness or injury  Weight Loss (Malnutrition): greater than 5% in 1 month   [place] (Muscle Loss): mild depletion  Clavicle Bone Region (Muscle Loss): mild depletion   Subcutaneous Fat Loss (Final Summary): well nourished  Muscle Loss Evaluation (Final Summary): mild protein-calorie malnutrition       Nutrition Follow-Up  RD Follow-up?: Yes

## 2019-08-26 NOTE — PROGRESS NOTES
Neuroendocrine Surgery  Progress Note    Hospital course: 43 y.o. woman transferred from OSH with abdominal pain and sepsis (GNR). 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  Hospital stay complicated by bilateral subsegmental pulmonary embolisms. Now s/p eddie-en-y gastric bypass takedown, jejunostomy take down and reanastomosis with reestablishment of gastrointestinal continuity.    S:  Post-op 5  NAEON. Pain slightly improved with adjusting pain control regimen. Thought is that patient's gut not completely absorbing PO medications at this time due to such significant time not being used.  No nausea/vomiting  No chest pain/SOB    O:  Temp:  [97.5 °F (36.4 °C)-99.9 °F (37.7 °C)] 98.3 °F (36.8 °C)  Pulse:  [105-132] 121  Resp:  [17-20] 17  SpO2:  [93 %-97 %] 93 %  BP: (116-131)/(73-84) 120/73    Physical Exam:  Gen: Alert and oriented x3. Appears mildly uncomfortable in bed  HEENT: normocephalic and atraumatic. EOMI. NG tube in place  Resp: Comfortable on room air  CV: Tachycardiac  Abd: Incision intact. Dressing on the left lower extremity clean dry and intact recently changed this AM, G-tube in place  Ext: warm and well perfused  MSK: normal range of motion, normal strength  Neuro: no focal deficits, normal sensation    I/O:  Intake/Output - Last 3 Shifts       08/24 0700 - 08/25 0659 08/25 0700 - 08/26 0659 08/26 0700 - 08/27 0659    P.O. 745      I.V. (mL/kg) 1379.5 (16) 923.8 (10.2)     Blood 350      NG/GT 60 80     IV Piggyback 550 650      598.3     Total Intake(mL/kg) 3984.5 (46.3) 2252.1 (24.9)     Urine (mL/kg/hr) 1950 (0.9) 1000 (0.5)     Drains 200 435     Stool 0      Total Output 2150 1435     Net +1834.5 +817.1            Stool Occurrence 0 x          CBC:   Recent Labs     08/24/19  0611 08/25/19  0643 08/26/19  0618    WBC 11.51 19.14* 21.72*   HGB 6.8* 8.5* 8.5*   HCT 22.3* 27.2* 27.3*    338 425*     CMP:  Recent Labs     08/24/19  0611 08/25/19  0643 08/26/19  0617    136 136   K 3.7 3.4* 3.4*    105 104   CO2 25 23 26   * 150* 124*   BUN 18 13 12   CREATININE 0.5 0.5 0.5   CALCIUM 8.3* 8.4* 8.5*   BILITOT 0.3 0.4 0.3   ALKPHOS 77 87 309*   AST 20 16 53*   ALT 22 21 51*   ANIONGAP 6* 8 6*     Recent Labs     08/24/19  0611 08/25/19  0643 08/26/19  0617   MG 1.8 1.5* 1.7   PHOS 2.4* 2.8 3.4     A/P: 44 y.o. female admitted 8/8/2019 with history of eddie-en-Y gastric bypass 13 years ago, DM, & small bowel volvulus s/p ex lap with extensive small bowel resection and end jejunostomy approx 1 month PTA from ex-lap with esophagogastrostomy, jejuno-jejunostomy, appendectomy.     NPO   Continue scheduled Gabapentin and Flexeril. Dilaudid PCA and rectal tylenol   H/H stable this morning   Continue Cefepime and Fluconazole. Start azithromycin for pneumonia coverage   Continue TPN and Lipids   Electrolyte replacement PRN    Myles De Los Santos MD   [employer] General Surgery, PGY-2  08/26/2019     Her pain better with IV medications or meds since not getting up soft  Has pneumonia with atelectasis    Labs noted CT scan from yesterday shows no evidence  Of leak  Small-bowel significantly dilated continue to observe

## 2019-08-26 NOTE — NURSING
Seen by Dr. De Los Santos.CT and KUB result reviewed with patient and her . Will continue to be NPO. Commenced her on PCA with Hydromorphone 0.2mg with 6 mts lockout 2mg per hour. Patient remains calm and co operative.

## 2019-08-27 LAB
ALBUMIN SERPL BCP-MCNC: 1.6 G/DL (ref 3.5–5.2)
ALP SERPL-CCNC: 326 U/L (ref 55–135)
ALT SERPL W/O P-5'-P-CCNC: 59 U/L (ref 10–44)
ANION GAP SERPL CALC-SCNC: 8 MMOL/L (ref 8–16)
ANISOCYTOSIS BLD QL SMEAR: SLIGHT
AST SERPL-CCNC: 41 U/L (ref 10–40)
BASOPHILS # BLD AUTO: ABNORMAL K/UL (ref 0–0.2)
BASOPHILS NFR BLD: 0 % (ref 0–1.9)
BILIRUB SERPL-MCNC: 0.3 MG/DL (ref 0.1–1)
BUN SERPL-MCNC: 11 MG/DL (ref 6–20)
CALCIUM SERPL-MCNC: 8.1 MG/DL (ref 8.7–10.5)
CHLORIDE SERPL-SCNC: 103 MMOL/L (ref 95–110)
CO2 SERPL-SCNC: 24 MMOL/L (ref 23–29)
CREAT SERPL-MCNC: 0.5 MG/DL (ref 0.5–1.4)
DIFFERENTIAL METHOD: ABNORMAL
EOSINOPHIL # BLD AUTO: ABNORMAL K/UL (ref 0–0.5)
EOSINOPHIL NFR BLD: 3 % (ref 0–8)
ERYTHROCYTE [DISTWIDTH] IN BLOOD BY AUTOMATED COUNT: 17.6 % (ref 11.5–14.5)
EST. GFR  (AFRICAN AMERICAN): >60 ML/MIN/1.73 M^2
EST. GFR  (NON AFRICAN AMERICAN): >60 ML/MIN/1.73 M^2
GLUCOSE SERPL-MCNC: 108 MG/DL (ref 70–110)
HCT VFR BLD AUTO: 26.7 % (ref 37–48.5)
HGB BLD-MCNC: 8.3 G/DL (ref 12–16)
HYPOCHROMIA BLD QL SMEAR: ABNORMAL
LYMPHOCYTES # BLD AUTO: ABNORMAL K/UL (ref 1–4.8)
LYMPHOCYTES NFR BLD: 4 % (ref 18–48)
MAGNESIUM SERPL-MCNC: 1.5 MG/DL (ref 1.6–2.6)
MCH RBC QN AUTO: 25.4 PG (ref 27–31)
MCHC RBC AUTO-ENTMCNC: 31.1 G/DL (ref 32–36)
MCV RBC AUTO: 82 FL (ref 82–98)
MONOCYTES # BLD AUTO: ABNORMAL K/UL (ref 0.3–1)
MONOCYTES NFR BLD: 1 % (ref 4–15)
NEUTROPHILS NFR BLD: 91 % (ref 38–73)
NEUTS BAND NFR BLD MANUAL: 1 %
PHOSPHATE SERPL-MCNC: 3.7 MG/DL (ref 2.7–4.5)
PLATELET # BLD AUTO: 423 K/UL (ref 150–350)
PLATELET BLD QL SMEAR: ABNORMAL
PMV BLD AUTO: 9.1 FL (ref 9.2–12.9)
POCT GLUCOSE: 110 MG/DL (ref 70–110)
POCT GLUCOSE: 90 MG/DL (ref 70–110)
POCT GLUCOSE: 90 MG/DL (ref 70–110)
POCT GLUCOSE: 92 MG/DL (ref 70–110)
POLYCHROMASIA BLD QL SMEAR: ABNORMAL
POTASSIUM SERPL-SCNC: 3.5 MMOL/L (ref 3.5–5.1)
PROT SERPL-MCNC: 5.1 G/DL (ref 6–8.4)
RBC # BLD AUTO: 3.27 M/UL (ref 4–5.4)
SODIUM SERPL-SCNC: 135 MMOL/L (ref 136–145)
WBC # BLD AUTO: 16.6 K/UL (ref 3.9–12.7)

## 2019-08-27 PROCEDURE — 94799 UNLISTED PULMONARY SVC/PX: CPT

## 2019-08-27 PROCEDURE — 85027 COMPLETE CBC AUTOMATED: CPT

## 2019-08-27 PROCEDURE — S0028 INJECTION, FAMOTIDINE, 20 MG: HCPCS | Performed by: SURGERY

## 2019-08-27 PROCEDURE — 36415 COLL VENOUS BLD VENIPUNCTURE: CPT

## 2019-08-27 PROCEDURE — 84100 ASSAY OF PHOSPHORUS: CPT

## 2019-08-27 PROCEDURE — S0171 BUMETANIDE 0.5 MG: HCPCS | Performed by: SURGERY

## 2019-08-27 PROCEDURE — 25500020 PHARM REV CODE 255: Performed by: SURGERY

## 2019-08-27 PROCEDURE — 94761 N-INVAS EAR/PLS OXIMETRY MLT: CPT

## 2019-08-27 PROCEDURE — 63600175 PHARM REV CODE 636 W HCPCS: Performed by: STUDENT IN AN ORGANIZED HEALTH CARE EDUCATION/TRAINING PROGRAM

## 2019-08-27 PROCEDURE — 80053 COMPREHEN METABOLIC PANEL: CPT

## 2019-08-27 PROCEDURE — 27000221 HC OXYGEN, UP TO 24 HOURS

## 2019-08-27 PROCEDURE — 94770 HC EXHALED C02 TEST: CPT

## 2019-08-27 PROCEDURE — 97530 THERAPEUTIC ACTIVITIES: CPT

## 2019-08-27 PROCEDURE — 85007 BL SMEAR W/DIFF WBC COUNT: CPT

## 2019-08-27 PROCEDURE — 25000003 PHARM REV CODE 250: Performed by: STUDENT IN AN ORGANIZED HEALTH CARE EDUCATION/TRAINING PROGRAM

## 2019-08-27 PROCEDURE — A4217 STERILE WATER/SALINE, 500 ML: HCPCS | Performed by: STUDENT IN AN ORGANIZED HEALTH CARE EDUCATION/TRAINING PROGRAM

## 2019-08-27 PROCEDURE — 21400001 HC TELEMETRY ROOM

## 2019-08-27 PROCEDURE — 93005 ELECTROCARDIOGRAM TRACING: CPT

## 2019-08-27 PROCEDURE — 25000003 PHARM REV CODE 250: Performed by: SURGERY

## 2019-08-27 PROCEDURE — 63600175 PHARM REV CODE 636 W HCPCS: Performed by: SURGERY

## 2019-08-27 PROCEDURE — 99900035 HC TECH TIME PER 15 MIN (STAT)

## 2019-08-27 PROCEDURE — 87040 BLOOD CULTURE FOR BACTERIA: CPT

## 2019-08-27 PROCEDURE — 83735 ASSAY OF MAGNESIUM: CPT

## 2019-08-27 PROCEDURE — 97165 OT EVAL LOW COMPLEX 30 MIN: CPT

## 2019-08-27 PROCEDURE — 93010 EKG 12-LEAD: ICD-10-PCS | Mod: ,,, | Performed by: INTERNAL MEDICINE

## 2019-08-27 PROCEDURE — 93010 ELECTROCARDIOGRAM REPORT: CPT | Mod: ,,, | Performed by: INTERNAL MEDICINE

## 2019-08-27 RX ORDER — POTASSIUM CHLORIDE 14.9 MG/ML
20 INJECTION INTRAVENOUS
Status: COMPLETED | OUTPATIENT
Start: 2019-08-27 | End: 2019-08-27

## 2019-08-27 RX ORDER — MAGNESIUM SULFATE HEPTAHYDRATE 40 MG/ML
2 INJECTION, SOLUTION INTRAVENOUS
Status: COMPLETED | OUTPATIENT
Start: 2019-08-27 | End: 2019-08-27

## 2019-08-27 RX ORDER — FUROSEMIDE 10 MG/ML
40 INJECTION INTRAMUSCULAR; INTRAVENOUS ONCE
Status: DISCONTINUED | OUTPATIENT
Start: 2019-08-27 | End: 2019-08-29

## 2019-08-27 RX ADMIN — Medication: at 05:08

## 2019-08-27 RX ADMIN — MAGNESIUM SULFATE IN WATER 2 G: 40 INJECTION, SOLUTION INTRAVENOUS at 03:08

## 2019-08-27 RX ADMIN — ENOXAPARIN SODIUM 40 MG: 100 INJECTION SUBCUTANEOUS at 06:08

## 2019-08-27 RX ADMIN — METOCLOPRAMIDE 10 MG: 5 INJECTION, SOLUTION INTRAMUSCULAR; INTRAVENOUS at 06:08

## 2019-08-27 RX ADMIN — ACETAMINOPHEN 650 MG: 650 SUPPOSITORY RECTAL at 03:08

## 2019-08-27 RX ADMIN — METOCLOPRAMIDE 10 MG: 5 INJECTION, SOLUTION INTRAMUSCULAR; INTRAVENOUS at 05:08

## 2019-08-27 RX ADMIN — MAGNESIUM SULFATE IN WATER 2 G: 40 INJECTION, SOLUTION INTRAVENOUS at 06:08

## 2019-08-27 RX ADMIN — FAMOTIDINE 20 MG: 10 INJECTION, SOLUTION INTRAVENOUS at 09:08

## 2019-08-27 RX ADMIN — POTASSIUM CHLORIDE 20 MEQ: 200 INJECTION, SOLUTION INTRAVENOUS at 06:08

## 2019-08-27 RX ADMIN — POTASSIUM CHLORIDE 20 MEQ: 200 INJECTION, SOLUTION INTRAVENOUS at 03:08

## 2019-08-27 RX ADMIN — ACETAMINOPHEN 650 MG: 650 SUPPOSITORY RECTAL at 05:08

## 2019-08-27 RX ADMIN — SODIUM CHLORIDE: 0.9 INJECTION, SOLUTION INTRAVENOUS at 05:08

## 2019-08-27 RX ADMIN — FLUCONAZOLE 200 MG: 2 INJECTION, SOLUTION INTRAVENOUS at 01:08

## 2019-08-27 RX ADMIN — METOCLOPRAMIDE 10 MG: 5 INJECTION, SOLUTION INTRAMUSCULAR; INTRAVENOUS at 12:08

## 2019-08-27 RX ADMIN — Medication 10 MG: at 09:08

## 2019-08-27 RX ADMIN — AZITHROMYCIN MONOHYDRATE 500 MG: 500 INJECTION, POWDER, LYOPHILIZED, FOR SOLUTION INTRAVENOUS at 12:08

## 2019-08-27 RX ADMIN — IOHEXOL 100 ML: 350 INJECTION, SOLUTION INTRAVENOUS at 05:08

## 2019-08-27 RX ADMIN — BUMETANIDE 0.5 MG: 0.25 INJECTION INTRAMUSCULAR; INTRAVENOUS at 09:08

## 2019-08-27 RX ADMIN — ACETAMINOPHEN 650 MG: 650 SUPPOSITORY RECTAL at 12:08

## 2019-08-27 RX ADMIN — MICONAZOLE NITRATE: 20 POWDER TOPICAL at 11:08

## 2019-08-27 RX ADMIN — SODIUM CHLORIDE: 234 INJECTION INTRAMUSCULAR; INTRAVENOUS; SUBCUTANEOUS at 05:08

## 2019-08-27 RX ADMIN — METOCLOPRAMIDE 10 MG: 5 INJECTION, SOLUTION INTRAMUSCULAR; INTRAVENOUS at 11:08

## 2019-08-27 RX ADMIN — FAMOTIDINE 20 MG: 10 INJECTION, SOLUTION INTRAVENOUS at 08:08

## 2019-08-27 RX ADMIN — IRON SUCROSE 100 MG: 20 INJECTION, SOLUTION INTRAVENOUS at 09:08

## 2019-08-27 RX ADMIN — MICONAZOLE NITRATE: 20 POWDER TOPICAL at 09:08

## 2019-08-27 RX ADMIN — Medication: at 11:08

## 2019-08-27 RX ADMIN — CYANOCOBALAMIN 1000 MCG: 1000 INJECTION, SOLUTION INTRAMUSCULAR; SUBCUTANEOUS at 09:08

## 2019-08-27 NOTE — PROGRESS NOTES
Neuroendocrine Surgery  Progress Note    Hospital course: 43 y.o. woman transferred from OSH with abdominal pain and sepsis (GNR). 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  Hospital stay complicated by bilateral subsegmental pulmonary embolisms. Now s/p eddie-en-y gastric bypass takedown, jejunostomy take down and reanastomosis with reestablishment of gastrointestinal continuity.    S:  NAEON. Pain slightly improved with adjusting pain control regimen. Thought is that patient's gut not completely absorbing PO medications at this time due to such significant time not being used.  Persistently tachycardic  No nausea/vomiting  No chest pain/SOB    O:  Temp:  [97.7 °F (36.5 °C)-99.3 °F (37.4 °C)] 99.3 °F (37.4 °C)  Pulse:  [114-136] 121  Resp:  [16-22] 22  SpO2:  [92 %-97 %] 96 %  BP: (107-127)/(57-78) 113/70    Physical Exam:  Gen: Alert and oriented x3. Appears mildly uncomfortable in bed  HEENT: normocephalic and atraumatic. EOMI. NG tube in place  Resp: Comfortable on room air  CV: Tachycardiac  Abd: Incision intact. Dressing on the left lower extremity clean dry and intact recently changed this AM, G-tube in place  Ext: warm and well perfused  MSK: normal range of motion, normal strength  Neuro: no focal deficits, normal sensation    I/O:  Intake/Output - Last 3 Shifts       08/25 0700 - 08/26 0659 08/26 0700 - 08/27 0659 08/27 0700 - 08/28 0659    P.O.  120     I.V. (mL/kg) 923.8 (10.2) 1638.8 (17.5)     Blood       NG/GT 80 60     IV Piggyback 650 500     .3 1342.5     Total Intake(mL/kg) 2252.1 (24.9) 3661.3 (39)     Urine (mL/kg/hr) 1000 (0.5) 1000 (0.4)     Drains 435 350     Stool  0     Total Output 1435 1350     Net +817.1 +2311.3            Stool Occurrence  0 x         CBC:   Recent Labs     08/25/19  0643 08/26/19  0618  08/27/19  0634   WBC 19.14* 21.72* 16.60*   HGB 8.5* 8.5* 8.3*   HCT 27.2* 27.3* 26.7*    425* 423*     CMP:  Recent Labs     08/25/19  0643 08/26/19  0617 08/27/19  0634    136 135*   K 3.4* 3.4* 3.5    104 103   CO2 23 26 24   * 124* 108   BUN 13 12 11   CREATININE 0.5 0.5 0.5   CALCIUM 8.4* 8.5* 8.1*   BILITOT 0.4 0.3 0.3   ALKPHOS 87 309* 326*   AST 16 53* 41*   ALT 21 51* 59*   ANIONGAP 8 6* 8     Recent Labs     08/25/19  0643 08/26/19  0617 08/27/19  0634   MG 1.5* 1.7 1.5*   PHOS 2.8 3.4 3.7     A/P: 44 y.o. female admitted 8/8/2019 with history of eddie-en-Y gastric bypass 13 years ago, DM, & small bowel volvulus s/p ex lap with extensive small bowel resection and end jejunostomy approx 1 month PTA from ex-lap with esophagogastrostomy, jejuno-jejunostomy, appendectomy.     NPO   Vent G tube as needed for distention   EKG, CXR, KUB, DVT lower extremity ultrasounds   Continue scheduled Gabapentin and Flexeril. Dilaudid PCA and rectal tylenol   H/H stable this morning   Continue Cefepime and Fluconazole. Start azithromycin for pneumonia coverage   Continue TPN and Lipids   Electrolyte replacement PRN    Myles De Los Santos MD   [employer] General Surgery, PGY-2  08/27/2019

## 2019-08-27 NOTE — PT/OT/SLP EVAL
Occupational Therapy   Evaluation    Name: Parvin Corbin  MRN: [id number]  Admitting Diagnosis:  Complications of gastric bypass surgery 6 Days Post-Op    Recommendations:     Discharge Recommendations: home  Discharge Equipment Recommendations:  none  Barriers to discharge:  None    Assessment:     Parvin Corbin is a 44 y.o. female with a medical diagnosis of Complications of gastric bypass surgery.  She presents with performance deficits affecting function: weakness, impaired self care skills, impaired functional mobilty, impaired endurance, pain, decreased ROM, impaired skin, edema, impaired cardiopulmonary response to activity.      Rehab Prognosis: Good; patient would benefit from acute skilled OT services to address these deficits and reach maximum level of function.       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  Patient found HOB elevated with oxygen, peripheral IV, PICC line upon OT entry to room.    General Precautions: Standard,  fall   Orthopedic Precautions:    Braces:       Occupational Performance:    Cognitive/Visual Perceptual:  AO4    Physical Exam:  BUE AROM/strength WFL    AMPAC 6 Click ADL:  AMPAC Total Score: 18    Treatment & Education:  Pt educated on role of OT/POC, given written copy of HEP and performed 5-10 reps of each w/HOB elevated; educated on and performed diaphragmatic and ;ateral breathing; educated on progressive muscle relaxation, given handout and performed via auditory instruction w/IPad.  Education:    Patient left HOB elevated with all lines intact, call button in reach, nurse notified and  present    GOALS:   Multidisciplinary Problems     Occupational Therapy Goals        Problem: Occupational Therapy Goal    Goal Priority Disciplines Outcome Interventions   Occupational Therapy Goal     OT, PT/OT Ongoing (interventions implemented as appropriate)    Description:  Goals to be met by: 9/27     Patient will increase functional independence with ADLs by performing:    UE Dressing with Mora.  LE Dressing with Modified Mora.  Grooming while standing with Mora.  Toileting from toilet with Mora for hygiene and clothing management.   Toilet transfer to toilet with Mora.  Upper extremity exercise program x10 reps per handout, with independence.                      History:     Past Medical History:   Diagnosis Date    ADD (attention deficit disorder)     Anxiety     Diabetes     Insomnia        Past Surgical History:   Procedure Laterality Date    CHOLECYSTECTOMY      ENDOMETRIAL ABLATION      EXPLORATORY LAPAROTOMY  06/12/2019    SBR -ischemic injury    GASTRECTOMY, PARTIAL exploratory laparotomy, esophagogastrostomy, jejunostomy, jejunoileostomy, appenedctomy, brad gastrotstomy, liver biopsy N/A 8/21/2019    [facility]    GASTRIC BYPASS  2006    LAPAROTOMY, EXPLORATORY N/A 8/21/2019    Performed by [facility] at [facility] OR    [place]  DELIVERY ONLY      , Low Cervical    TUBAL LIGATION         Time Tracking:     OT Date of Treatment: 19  OT Start Time: 926  OT Stop Time:   OT Total Time (min): 38 min    Billable Minutes:Evaluation 15  Therapeutic Activity 23    Sánchez Smith OT  2019

## 2019-08-27 NOTE — PLAN OF CARE
Problem: Adult Inpatient Plan of Care  Goal: Plan of Care Review  Outcome: Ongoing (interventions implemented as appropriate)  Pt AAOx4. Spouse at bedside. NPO status maintained. Medications administered as ordered. PICC CDI, infusing. Wound was cleaned, packed and dressed. Patient ambulated around unit with assistance. PCA pump available for pt and in use. Pt verbalized that pain is more controlled today. G tube clamped, suction available at bedside. G tube was irrigated. Safety maintained. Will continue to monitor.

## 2019-08-27 NOTE — PLAN OF CARE
Problem: Adult Inpatient Plan of Care  Goal: Plan of Care Review  Outcome: Ongoing (interventions implemented as appropriate)  Pt AAOx4, VSS, NAD. No complaints of headache or N/V. Pain controlled with PCA pump. Blood glucose monitored. IV fluids and antibiotics infusing per MAR. NPO status maintained. No other needs at this time. Safety maintained. Will continue to monitor.

## 2019-08-27 NOTE — PLAN OF CARE
Primary care team notified of sever sepsis alert  Per [facility].  Rounded on patient she stated she is having SOB and excessive fluid and skin tightening. Pain is controlled  No other complaints at this time. Dr. De Los Santos on floor, notified primary nurse to request lactic and /or blood cultures to be ordered per severe sepsis screening checklist.  Will continue to monitor.

## 2019-08-27 NOTE — PLAN OF CARE
Problem: Occupational Therapy Goal  Goal: Occupational Therapy Goal  Goals to be met by: 9/27     Patient will increase functional independence with ADLs by performing:    UE Dressing with Placida.  LE Dressing with Modified [place].  Grooming while standing with Placida.  Toileting from toilet with Placida for hygiene and clothing management.   Toilet transfer to toilet with Placida.  Upper extremity exercise program x10 reps per handout, with independence.    Outcome: Ongoing (interventions implemented as appropriate)  OT eval performed, report to follow    No anticipated DC needs

## 2019-08-27 NOTE — PLAN OF CARE
Problem: Adult Inpatient Plan of Care  Goal: Plan of Care Review  Outcome: Ongoing (interventions implemented as appropriate)  Patient received on room air with mild Shortness of breath. Sats = 94%. Patient placed on 2LPM nasal cannula with Sats now 96%. Xray in room at this time. Patient states that she is working on IS on own and with  getting 500-750. Would like for RT to come back to work with her on IS after while. BS = clear but decreased in bases.

## 2019-08-27 NOTE — PLAN OF CARE
s/p  extensive small bowel resection and end jejunostomy approx 1 month PTA      8/21 s/p ex -lap with esophagogastrostomy, jejuno-jejunostomy, appendectomy    faviola Mario          remains NPO   G tube for venting due to distention   TPN/Lipids      will continue to monitor pt status to determine d/c needs     prior to admit:  current with [facility] for home TPN -- Chavo with [facility] is aware pt it here         08/27/19 3402   Discharge Reassessment   Assessment Type Discharge Planning Reassessment

## 2019-08-27 NOTE — PHYSICIAN QUERY
"PT Name: Parvin Corbin  MR #: [id number]    Physician Query Form - Hematology Clarification      CDS/: Lauren Moreno, RN, CDS               Contact information: [email or web address]                                                                                                                         [phone]    This form is a permanent document in the medical record.      Query Date: August 27, 2019    By submitting this query, we are merely seeking further clarification of documentation. Please utilize your independent clinical judgment when addressing the question(s) below.    The Medical record contains the following:   Indicators  Supporting Clinical Findings Location in Medical Record   x "Anemia" documented anemia Neuroendocrine PN 8/23   x H & H = 9.9/34.0 --> 8.5/28.6 --> 5.4/17.8 --> 8.5/27.2  Labs 8/20- 8/25   x BP =                     HR= patient is POD #2, tachycardiac Surgery PN 8/23    "GI bleeding" documented     x Acute bleeding (Non GI site) Estimated Blood Loss (EBL): 100cc    Dressing needed to be changed twice overnight due to bleeding    Dr llamas removed large hematoma  approx 200 ml volume from left abd incison    Op Note 8/21    Neuroendocrine PN 8/23      RN Note 8/24   x Transfusion(s) 2uRBC's ordered Surgery PN 8/23    Treatment:     x Other:  Critical that patient received blood transfusion as patient is POD #2, tachycardiac, and unable to tolerate sitting and standing. Surgery PN 8/23     Provider, please specify diagnosis or diagnoses associated with above clinical findings.    [ xxx ] Acute blood loss anemia expected post-operatively   [  ] Acute blood loss anemia     [  ] Other Hematological Diagnosis (please specify):     [  ] Clinically Undetermined       Please document in your progress notes daily for the duration of treatment, until resolved, and include in your discharge summary.                                                                                          "

## 2019-08-27 NOTE — NURSING
Was notified by VN of pt sepsis risk on epic. Notified of MEW score of 5, HR between 120-140s, temp of 99.3. Also notified that there were no labs ordered. Notified Dr. De Los Santos about situation. Was told he will look at the situation and put in orders if needed. Also spoke with him about the order for Lasiks because of a new order for bumetanide. Was told to hold Lasiks and give the bumetanide.

## 2019-08-28 LAB
ALBUMIN SERPL BCP-MCNC: 1.6 G/DL (ref 3.5–5.2)
ALP SERPL-CCNC: 359 U/L (ref 55–135)
ALT SERPL W/O P-5'-P-CCNC: 44 U/L (ref 10–44)
ANION GAP SERPL CALC-SCNC: 9 MMOL/L (ref 8–16)
AST SERPL-CCNC: 25 U/L (ref 10–40)
BASOPHILS # BLD AUTO: 0.07 K/UL (ref 0–0.2)
BASOPHILS NFR BLD: 0.4 % (ref 0–1.9)
BILIRUB SERPL-MCNC: 0.4 MG/DL (ref 0.1–1)
BUN SERPL-MCNC: 10 MG/DL (ref 6–20)
CALCIUM SERPL-MCNC: 8.2 MG/DL (ref 8.7–10.5)
CHLORIDE SERPL-SCNC: 101 MMOL/L (ref 95–110)
CO2 SERPL-SCNC: 27 MMOL/L (ref 23–29)
CREAT SERPL-MCNC: 0.5 MG/DL (ref 0.5–1.4)
DIFFERENTIAL METHOD: ABNORMAL
EOSINOPHIL # BLD AUTO: 0.7 K/UL (ref 0–0.5)
EOSINOPHIL NFR BLD: 4.3 % (ref 0–8)
ERYTHROCYTE [DISTWIDTH] IN BLOOD BY AUTOMATED COUNT: 17.6 % (ref 11.5–14.5)
EST. GFR  (AFRICAN AMERICAN): >60 ML/MIN/1.73 M^2
EST. GFR  (NON AFRICAN AMERICAN): >60 ML/MIN/1.73 M^2
GLUCOSE SERPL-MCNC: 104 MG/DL (ref 70–110)
GRAM STN SPEC: NORMAL
HCT VFR BLD AUTO: 26.4 % (ref 37–48.5)
HGB BLD-MCNC: 8.1 G/DL (ref 12–16)
LYMPHOCYTES # BLD AUTO: 1.4 K/UL (ref 1–4.8)
LYMPHOCYTES NFR BLD: 8 % (ref 18–48)
MAGNESIUM SERPL-MCNC: 1.7 MG/DL (ref 1.6–2.6)
MCH RBC QN AUTO: 25.2 PG (ref 27–31)
MCHC RBC AUTO-ENTMCNC: 30.7 G/DL (ref 32–36)
MCV RBC AUTO: 82 FL (ref 82–98)
MONOCYTES # BLD AUTO: 1.6 K/UL (ref 0.3–1)
MONOCYTES NFR BLD: 9.1 % (ref 4–15)
NEUTROPHILS # BLD AUTO: 12.8 K/UL (ref 1.8–7.7)
NEUTROPHILS NFR BLD: 78.2 % (ref 38–73)
PHOSPHATE SERPL-MCNC: 4.5 MG/DL (ref 2.7–4.5)
PLATELET # BLD AUTO: 468 K/UL (ref 150–350)
PMV BLD AUTO: 9.2 FL (ref 9.2–12.9)
POCT GLUCOSE: 116 MG/DL (ref 70–110)
POCT GLUCOSE: 145 MG/DL (ref 70–110)
POTASSIUM SERPL-SCNC: 3.9 MMOL/L (ref 3.5–5.1)
PROT SERPL-MCNC: 5.2 G/DL (ref 6–8.4)
RBC # BLD AUTO: 3.21 M/UL (ref 4–5.4)
SODIUM SERPL-SCNC: 137 MMOL/L (ref 136–145)
WBC # BLD AUTO: 17.18 K/UL (ref 3.9–12.7)

## 2019-08-28 PROCEDURE — B4185 PARENTERAL SOL 10 GM LIPIDS: HCPCS | Performed by: SURGERY

## 2019-08-28 PROCEDURE — 84100 ASSAY OF PHOSPHORUS: CPT

## 2019-08-28 PROCEDURE — 25000003 PHARM REV CODE 250: Performed by: SURGERY

## 2019-08-28 PROCEDURE — 25000003 PHARM REV CODE 250: Performed by: STUDENT IN AN ORGANIZED HEALTH CARE EDUCATION/TRAINING PROGRAM

## 2019-08-28 PROCEDURE — S0028 INJECTION, FAMOTIDINE, 20 MG: HCPCS | Performed by: SURGERY

## 2019-08-28 PROCEDURE — S0171 BUMETANIDE 0.5 MG: HCPCS | Performed by: SURGERY

## 2019-08-28 PROCEDURE — 87070 CULTURE OTHR SPECIMN AEROBIC: CPT | Mod: 59

## 2019-08-28 PROCEDURE — 87075 CULTR BACTERIA EXCEPT BLOOD: CPT

## 2019-08-28 PROCEDURE — 94770 HC EXHALED C02 TEST: CPT

## 2019-08-28 PROCEDURE — 99900035 HC TECH TIME PER 15 MIN (STAT)

## 2019-08-28 PROCEDURE — 21400001 HC TELEMETRY ROOM

## 2019-08-28 PROCEDURE — 80053 COMPREHEN METABOLIC PANEL: CPT

## 2019-08-28 PROCEDURE — 87106 FUNGI IDENTIFICATION YEAST: CPT

## 2019-08-28 PROCEDURE — 94799 UNLISTED PULMONARY SVC/PX: CPT

## 2019-08-28 PROCEDURE — 63600175 PHARM REV CODE 636 W HCPCS: Performed by: SURGERY

## 2019-08-28 PROCEDURE — 82247 BILIRUBIN TOTAL: CPT

## 2019-08-28 PROCEDURE — 63600175 PHARM REV CODE 636 W HCPCS: Performed by: STUDENT IN AN ORGANIZED HEALTH CARE EDUCATION/TRAINING PROGRAM

## 2019-08-28 PROCEDURE — 83735 ASSAY OF MAGNESIUM: CPT

## 2019-08-28 PROCEDURE — A4217 STERILE WATER/SALINE, 500 ML: HCPCS | Performed by: SURGERY

## 2019-08-28 PROCEDURE — 87205 SMEAR GRAM STAIN: CPT

## 2019-08-28 PROCEDURE — 97803 MED NUTRITION INDIV SUBSEQ: CPT

## 2019-08-28 PROCEDURE — 85025 COMPLETE CBC W/AUTO DIFF WBC: CPT

## 2019-08-28 PROCEDURE — 97530 THERAPEUTIC ACTIVITIES: CPT

## 2019-08-28 PROCEDURE — 36415 COLL VENOUS BLD VENIPUNCTURE: CPT

## 2019-08-28 PROCEDURE — 87102 FUNGUS ISOLATION CULTURE: CPT | Mod: 59

## 2019-08-28 PROCEDURE — 94761 N-INVAS EAR/PLS OXIMETRY MLT: CPT

## 2019-08-28 RX ORDER — HYDROMORPHONE HYDROCHLORIDE 1 MG/ML
0.5 INJECTION, SOLUTION INTRAMUSCULAR; INTRAVENOUS; SUBCUTANEOUS EVERY 4 HOURS PRN
Status: DISCONTINUED | OUTPATIENT
Start: 2019-08-28 | End: 2019-08-29

## 2019-08-28 RX ORDER — PANTOPRAZOLE SODIUM 40 MG/1
40 TABLET, DELAYED RELEASE ORAL DAILY
Status: DISCONTINUED | OUTPATIENT
Start: 2019-08-29 | End: 2019-09-01 | Stop reason: HOSPADM

## 2019-08-28 RX ORDER — METOCLOPRAMIDE 10 MG/1
10 TABLET ORAL
Status: DISCONTINUED | OUTPATIENT
Start: 2019-08-28 | End: 2019-08-31

## 2019-08-28 RX ORDER — POLYETHYLENE GLYCOL 3350 17 G/17G
17 POWDER, FOR SOLUTION ORAL DAILY
Status: DISCONTINUED | OUTPATIENT
Start: 2019-08-29 | End: 2019-08-31

## 2019-08-28 RX ORDER — KETOROLAC TROMETHAMINE 30 MG/ML
15 INJECTION, SOLUTION INTRAMUSCULAR; INTRAVENOUS EVERY 6 HOURS
Status: DISCONTINUED | OUTPATIENT
Start: 2019-08-28 | End: 2019-08-28

## 2019-08-28 RX ORDER — KETOROLAC TROMETHAMINE 30 MG/ML
10 INJECTION, SOLUTION INTRAMUSCULAR; INTRAVENOUS EVERY 6 HOURS
Status: DISCONTINUED | OUTPATIENT
Start: 2019-08-28 | End: 2019-08-28 | Stop reason: SDUPTHER

## 2019-08-28 RX ORDER — OXYCODONE AND ACETAMINOPHEN 5; 325 MG/1; MG/1
1 TABLET ORAL EVERY 4 HOURS PRN
Status: DISCONTINUED | OUTPATIENT
Start: 2019-08-28 | End: 2019-09-01 | Stop reason: HOSPADM

## 2019-08-28 RX ORDER — MAGNESIUM SULFATE HEPTAHYDRATE 40 MG/ML
2 INJECTION, SOLUTION INTRAVENOUS
Status: COMPLETED | OUTPATIENT
Start: 2019-08-28 | End: 2019-08-28

## 2019-08-28 RX ORDER — KETOROLAC TROMETHAMINE 30 MG/ML
15 INJECTION, SOLUTION INTRAMUSCULAR; INTRAVENOUS EVERY 6 HOURS
Status: COMPLETED | OUTPATIENT
Start: 2019-08-28 | End: 2019-08-29

## 2019-08-28 RX ORDER — HYDROMORPHONE HYDROCHLORIDE 1 MG/ML
1 INJECTION, SOLUTION INTRAMUSCULAR; INTRAVENOUS; SUBCUTANEOUS ONCE
Status: COMPLETED | OUTPATIENT
Start: 2019-08-28 | End: 2019-08-28

## 2019-08-28 RX ORDER — PREGABALIN 75 MG/1
75 CAPSULE ORAL 2 TIMES DAILY
Status: DISCONTINUED | OUTPATIENT
Start: 2019-08-28 | End: 2019-08-31

## 2019-08-28 RX ORDER — BUMETANIDE 0.25 MG/ML
1 INJECTION INTRAMUSCULAR; INTRAVENOUS DAILY
Status: DISCONTINUED | OUTPATIENT
Start: 2019-08-29 | End: 2019-09-01 | Stop reason: HOSPADM

## 2019-08-28 RX ADMIN — SUCRALFATE 1 G: 1 SUSPENSION ORAL at 05:08

## 2019-08-28 RX ADMIN — I.V. FAT EMULSION 250 ML: 20 EMULSION INTRAVENOUS at 10:08

## 2019-08-28 RX ADMIN — ACETAMINOPHEN 650 MG: 650 SUPPOSITORY RECTAL at 11:08

## 2019-08-28 RX ADMIN — METOCLOPRAMIDE HYDROCHLORIDE 10 MG: 10 TABLET ORAL at 03:08

## 2019-08-28 RX ADMIN — Medication: at 02:08

## 2019-08-28 RX ADMIN — FAMOTIDINE 20 MG: 10 INJECTION, SOLUTION INTRAVENOUS at 09:08

## 2019-08-28 RX ADMIN — OXYCODONE AND ACETAMINOPHEN 1 TABLET: 5; 325 TABLET ORAL at 07:08

## 2019-08-28 RX ADMIN — CYANOCOBALAMIN 1000 MCG: 1000 INJECTION, SOLUTION INTRAMUSCULAR; SUBCUTANEOUS at 09:08

## 2019-08-28 RX ADMIN — METOCLOPRAMIDE 10 MG: 5 INJECTION, SOLUTION INTRAMUSCULAR; INTRAVENOUS at 05:08

## 2019-08-28 RX ADMIN — AZITHROMYCIN MONOHYDRATE 500 MG: 500 INJECTION, POWDER, LYOPHILIZED, FOR SOLUTION INTRAVENOUS at 03:08

## 2019-08-28 RX ADMIN — MAGNESIUM SULFATE IN WATER 2 G: 40 INJECTION, SOLUTION INTRAVENOUS at 03:08

## 2019-08-28 RX ADMIN — KETOROLAC TROMETHAMINE 15 MG: 30 INJECTION, SOLUTION INTRAMUSCULAR at 04:08

## 2019-08-28 RX ADMIN — FLUCONAZOLE 200 MG: 2 INJECTION, SOLUTION INTRAVENOUS at 02:08

## 2019-08-28 RX ADMIN — METOCLOPRAMIDE HYDROCHLORIDE 10 MG: 10 TABLET ORAL at 10:08

## 2019-08-28 RX ADMIN — OXYCODONE AND ACETAMINOPHEN 1 TABLET: 5; 325 TABLET ORAL at 11:08

## 2019-08-28 RX ADMIN — HYDROMORPHONE HYDROCHLORIDE 0.5 MG: 1 INJECTION, SOLUTION INTRAMUSCULAR; INTRAVENOUS; SUBCUTANEOUS at 10:08

## 2019-08-28 RX ADMIN — BUMETANIDE 0.5 MG: 0.25 INJECTION INTRAMUSCULAR; INTRAVENOUS at 09:08

## 2019-08-28 RX ADMIN — SUCRALFATE 1 G: 1 SUSPENSION ORAL at 09:08

## 2019-08-28 RX ADMIN — KETOROLAC TROMETHAMINE 15 MG: 30 INJECTION, SOLUTION INTRAMUSCULAR at 09:08

## 2019-08-28 RX ADMIN — CYCLOBENZAPRINE HYDROCHLORIDE 5 MG: 5 TABLET, FILM COATED ORAL at 10:08

## 2019-08-28 RX ADMIN — PREGABALIN 75 MG: 75 CAPSULE ORAL at 10:08

## 2019-08-28 RX ADMIN — MICONAZOLE NITRATE: 20 POWDER TOPICAL at 09:08

## 2019-08-28 RX ADMIN — MAGNESIUM SULFATE IN WATER 2 G: 40 INJECTION, SOLUTION INTRAVENOUS at 09:08

## 2019-08-28 RX ADMIN — ACETAMINOPHEN 650 MG: 650 SUPPOSITORY RECTAL at 12:08

## 2019-08-28 RX ADMIN — HYDROMORPHONE HYDROCHLORIDE 1 MG: 1 INJECTION, SOLUTION INTRAMUSCULAR; INTRAVENOUS; SUBCUTANEOUS at 02:08

## 2019-08-28 RX ADMIN — HYDROMORPHONE HYDROCHLORIDE 0.5 MG: 1 INJECTION, SOLUTION INTRAMUSCULAR; INTRAVENOUS; SUBCUTANEOUS at 05:08

## 2019-08-28 RX ADMIN — OXYCODONE AND ACETAMINOPHEN 1 TABLET: 5; 325 TABLET ORAL at 03:08

## 2019-08-28 RX ADMIN — IRON SUCROSE 100 MG: 20 INJECTION, SOLUTION INTRAVENOUS at 09:08

## 2019-08-28 RX ADMIN — KETOROLAC TROMETHAMINE 15 MG: 30 INJECTION, SOLUTION INTRAMUSCULAR at 10:08

## 2019-08-28 RX ADMIN — SODIUM CHLORIDE: 234 INJECTION INTRAMUSCULAR; INTRAVENOUS; SUBCUTANEOUS at 05:08

## 2019-08-28 RX ADMIN — ENOXAPARIN SODIUM 40 MG: 100 INJECTION SUBCUTANEOUS at 03:08

## 2019-08-28 RX ADMIN — PREGABALIN 75 MG: 75 CAPSULE ORAL at 09:08

## 2019-08-28 NOTE — CONSULTS
Inpatient Radiology Pre-procedure Note    History of Present Illness:  Parvin Corbin is a 44 y.o. female with pertinent PMHx of eddie-en-y gastric bypass 13 years prior who presented with small bowel volvulus with ischemia s/p ex lap, eddie-en-y takedown, esophagogastrectomy, small bowel resection, entero-jejunostomy and appendectomy.     Post-op course complicated by development of complex left pleural effusion and right flank and eliu-rectal intra-peritoneal complex collections with concerns for empyema and multi-focal abdominal abscesses.    Inpatient IR consult placed for image-guided left thoracentesis and abdominal fluid collection aspiration with both samples sent to lab for evaluation.       Admission H&P reviewed.  Past Medical History:   Diagnosis Date    ADD (attention deficit disorder)     Anxiety     Diabetes     Insomnia      Past Surgical History:   Procedure Laterality Date    CHOLECYSTECTOMY      ENDOMETRIAL ABLATION      EXPLORATORY LAPAROTOMY  2019    SBR -ischemic injury    GASTRECTOMY, PARTIAL exploratory laparotomy, esophagogastrostomy, jejunostomy, jejunoileostomy, appenedctomy, brad gastrotstomy, liver biopsy N/A 2019    Performed by [facility]ger [facility] at [facility] OR    GASTRIC BYPASS      LAPAROTOMY, EXPLORATORY N/A 2019    Performed by [facility] at [facility] OR    [place]  DELIVERY ONLY      , Low Cervical    TUBAL LIGATION         Review of Systems:   As documented in primary team H&P    Home Meds:   Prior to Admission medications    Medication Sig Start Date End Date Taking? Authorizing Provider   acetaminophen (TYLENOL) 500 MG tablet Take 1,000 mg by mouth every 6 (six) hours as needed for Pain.   Yes Historical Provider, MD   acyclovir (ZOVIRAX) 400 MG tablet Take 400 mg by mouth 2 (two) times daily as needed (fever blisters).    Yes Historical Provider, MD   aluminum & magnesium hydroxide-simethicone (ANTACID ANTI-GAS)  400-400-40 mg/5 mL suspension Take 10 mLs by mouth every 6 (six) hours as needed for Indigestion.   Yes Historical Provider, MD   apixaban (ELIQUIS) 5 mg Tab Take 5 mg by mouth 2 (two) times daily.   Yes Historical Provider, MD   chlordiazepoxide-clidinium 5-2.5 mg (LIBRAX) 5-2.5 mg Cap Take 1 capsule by mouth 3 (three) times daily with meals.   Yes Historical Provider, MD   cyanocobalamin (VITAMIN B-12) 1000 MCG tablet Take 1,000 mcg by mouth once daily.   Yes Historical Provider, MD   cyanocobalamin, vitamin B-12, 1,000 mcg/mL Kit Inject 1,000 mcg as directed every Wednesday.   Yes Historical Provider, MD   HYDROcodone-acetaminophen (NORCO)  mg per tablet Take 1 tablet by mouth every 6 (six) hours as needed for Pain.   Yes Historical Provider, MD   insulin regular 100 unit/mL Inj injection Inject 10 Units into the skin 4 (four) times daily with meals and nightly.   Yes Historical Provider, MD   ondansetron (ZOFRAN) 8 MG tablet Take 8 mg by mouth 2 (two) times daily as needed for Nausea.   Yes Historical Provider, MD   pedi multivit no.91-iron fum (CHILDREN'S CHEW MULTIVIT-IRON) 15 mg iron Chew Take by mouth.   Yes Historical Provider, MD   ranitidine (ZANTAC) 150 MG tablet Take 150 mg by mouth 2 (two) times daily.   Yes Historical Provider, MD   sucralfate (CARAFATE) 100 mg/mL suspension Take 1 g by mouth 4 (four) times daily with meals and nightly.   Yes Historical Provider, MD   zolpidem (AMBIEN CR) 12.5 MG CR tablet Take 12.5 mg by mouth nightly.   Yes Historical Provider, MD     Scheduled Meds:    acetaminophen  650 mg Rectal 4 times per day    azithromycin  500 mg Intravenous Q24H    bisacodyl  10 mg Rectal Daily    [START ON 8/29/2019] bumetanide  1 mg Intravenous Daily    cyanocobalamin  1,000 mcg Subcutaneous Daily    cyclobenzaprine  5 mg Oral TID    enoxparin  40 mg Subcutaneous Q24H    famotidine (PF)  20 mg Intravenous BID    fat emulsion 20%  250 mL Intravenous Every Mon, Wed, Fri     furosemide  40 mg Intravenous Once    iron sucrose (VENOFER) IVPB  100 mg Intravenous Daily    ketorolac  15 mg Intravenous Q6H    magnesium sulfate IVPB  2 g Intravenous Q2H    metoclopramide HCl  10 mg Oral TID AC    miconazole NITRATE 2 %   Topical (Top) BID    pregabalin  75 mg Oral BID    sucralfate  1 g Oral QID (WM & HS)     Continuous Infusions:    TPN ADULT CENTRAL LINE CUSTOM 50 mL/hr at 08/27/19 1708    TPN ADULT CENTRAL LINE CUSTOM       PRN Meds:sodium chloride, bacitracin, Dextrose 10% Bolus, Dextrose 10% Bolus, diphenhydrAMINE, glucagon (human recombinant), insulin aspart U-100, naloxone, ondansetron, promethazine (PHENERGAN) IVPB, promethazine (PHENERGAN) IVPB, sodium chloride 0.9%, zolpidem  Anticoagulants/Antiplatelets: Lovenox    Allergies: Review of patient's allergies indicates:  No Known Allergies  Sedation Hx: have not been any systemic reactions    Labs:  No results for input(s): INR in the last 168 hours.    Invalid input(s):  PT,  PTT    Recent Labs   Lab 08/28/19  0440   WBC 17.18*   HGB 8.1*   HCT 26.4*   MCV 82   *      Recent Labs   Lab 08/28/19  0440         K 3.9      CO2 27   BUN 10   CREATININE 0.5   CALCIUM 8.2*   MG 1.7   ALT 44   AST 25   ALBUMIN 1.6*   BILITOT 0.4         Vitals:  Temp: 98.8 °F (37.1 °C) (08/28/19 0745)  Pulse: (!) 127 (08/28/19 1315)  Resp: 16 (08/28/19 1315)  BP: (!) 140/87 (08/28/19 1315)  SpO2: 100 % (08/28/19 1315)     Physical Exam:  ASA: III  Mallampati: I    General: no acute distress  Mental Status: alert and oriented to person, place and time  HEENT: normocephalic, atraumatic  Chest: unlabored breathing  Heart: regular heart rate  Abdomen: nondistended  Extremity: moves all extremities    A/P:  44 y.o. female with complex left pleural effusion and right flank and eliu-rectal intra-peritoneal complex collections with concerns for empyema and multi-focal abdominal abscesses.    1. Will attempt US-guided left  thoracentesis as well as US-guided aspiration of RUQ abdominal fluid collection aspiration with both samples sent to lab for evaluation. Local anesthetic will be used only.     Risks (including, but not limited to, pain, bleeding, infection, damage to nearby structures, failure to obtain sufficient material for a diagnosis, the need for additional procedures, and death), benefits, and alternatives were discussed with the patient. All questions were answered to the best of my abilities. The patient wishes to proceed with the procedure. Written informed consent was obtained.    Thank you for considering IR for the care of your patient.     Seymour Burger MD  Interventional Radiology

## 2019-08-28 NOTE — PROGRESS NOTES
Asked to see due to abnormal EKG which was read as flutter but clearly sinus tachycardia. Will see and speak to you in am

## 2019-08-28 NOTE — PT/OT/SLP PROGRESS
Occupational Therapy  Missed Visit    Patient Name:  Parvin Corbin   MRN:  [id number]    Patient not seen at this time 2/2 going to IR shortly and requesting therapy after procedure.  Pt reports pt is 5/10 which is improvement over what it has been the past week.  Pt reports being anxious about procedure; utilized IPad to cue up progressive muscle relaxation video. Will follow-up .    Sánchez Smith OT  8/28/2019

## 2019-08-28 NOTE — PROCEDURES
Radiology Post-Procedure Note    Pre Op Diagnosis: 1. Left pleural effusion 2. Intra-peritoneal complex fluid collection  Post Op Diagnosis: Same    Procedure: 1. US-guided diagnostic and therapeutic left-sided thoracentesis 2. US-guided diagnostic intra-peritoneal complex fluid collection aspiration    Procedure performed by: Seymour Burger MD    Written Informed Consent Obtained: Yes  Specimen Removed: YES, 20-cc of clear serous pleural fluid and 15-cc of serosanguinous peritoneal fluid  Estimated Blood Loss: none    Findings:   Successful left sided thoracentesis and RUQ intra-peritoneal fluid collection aspiration with local anesthetic only. Both samples sent for evaluation.     Patient tolerated the procedure well. No immediate post-procedural complications noted.     Thank you for considering IR for the care of your patient.     Seymour Burger MD  Interventional Radiology

## 2019-08-28 NOTE — PROGRESS NOTES
Neuroendocrine Surgery  Progress Note    Hospital course: 43 y.o. woman transferred from OSH with abdominal pain and sepsis (GNR). 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  Hospital stay complicated by bilateral subsegmental pulmonary embolisms. Now s/p eddie-en-y gastric bypass takedown, jejunostomy take down and reanastomosis with reestablishment of gastrointestinal continuity.    S:  NAEON.  CT yesterday showed increased pleural effusion on left side  Had large soft bowel movement overnight  Persistently tachycardic  No nausea/vomiting  No chest pain/SOB    O:  Temp:  [98.2 °F (36.8 °C)-99.2 °F (37.3 °C)] 98.8 °F (37.1 °C)  Pulse:  [121-146] 121  Resp:  [18-24] 24  SpO2:  [96 %-99 %] 96 %  BP: (115-126)/(67-84) 120/84    Physical Exam:  Gen: Alert and oriented x3. Appears mildly uncomfortable in bed  HEENT: normocephalic and atraumatic. EOMI. NG tube in place  Resp: Comfortable on room air  CV: Tachycardiac  Abd: Incision intact. Dressing on the left lower extremity clean dry and intact recently changed this AM, G-tube in place  Ext: warm and well perfused  MSK: normal range of motion, normal strength  Neuro: no focal deficits, normal sensation    I/O:  Intake/Output - Last 3 Shifts       08/26 0700 - 08/27 0659 08/27 0700 - 08/28 0659 08/28 0700 - 08/29 0659    P.O. 120      I.V. (mL/kg) 1638.8 (17.5) 100 (1.1)     NG/GT 60 60     IV Piggyback 500 650     TPN 1342.5 1060     Total Intake(mL/kg) 3661.3 (39) 1870 (20.4)     Urine (mL/kg/hr) 1000 (0.4) 1750 (0.8)     Drains 350      Stool 0 550     Total Output 1350 2300     Net +2311.3 -430            Stool Occurrence 0 x          CBC:   Recent Labs     08/26/19  0618 08/27/19  0634 08/28/19  0440   WBC 21.72* 16.60* 17.18*   HGB 8.5* 8.3* 8.1*   HCT 27.3* 26.7* 26.4*   * 423* 468*      CMP:  Recent Labs     08/26/19  0617 08/27/19  0634 08/28/19  0440    135* 137   K 3.4* 3.5 3.9    103 101   CO2 26 24 27   * 108 104   BUN 12 11 10   CREATININE 0.5 0.5 0.5   CALCIUM 8.5* 8.1* 8.2*   BILITOT 0.3 0.3 0.4   ALKPHOS 309* 326* 359*   AST 53* 41* 25   ALT 51* 59* 44   ANIONGAP 6* 8 9     Recent Labs     08/26/19  0617 08/27/19  0634 08/28/19  0440   MG 1.7 1.5* 1.7   PHOS 3.4 3.7 4.5     Radiology:  DVT lower extremities negative  CT chest/abd/pelvis final reads pending    A/P: 44 y.o. female admitted 8/8/2019 with history of eddie-en-Y gastric bypass 13 years ago, DM, & small bowel volvulus s/p ex lap with extensive small bowel resection and end jejunostomy approx 1 month PTA from ex-lap with esophagogastrostomy, jejuno-jejunostomy, appendectomy.     NPO   Vent G tube as needed for distention   IR consult for left thoracentesis - send for aerobic, anaerobic, gram stain, fungal, t bili   Continue scheduled Gabapentin and Flexeril and rectal tylenol. Discontinue dilaudid PCA today.   H/H stable this morning   Continue Cefepime and Fluconazole. Start azithromycin for pneumonia coverage   Continue TPN and Lipids   BID dressing changes to left abdomen previous ostomy site   Electrolyte replacement PRN    Myles De Los Santos MD   [employer] General Surgery, PGY-2  08/28/2019

## 2019-08-28 NOTE — PLAN OF CARE
Problem: Occupational Therapy Goal  Goal: Occupational Therapy Goal  Goals to be met by: 9/27     Patient will increase functional independence with ADLs by performing:    UE Dressing with [place].  LE Dressing with Modified [place].  Grooming while standing with [place].  Toileting from toilet with [place] for hygiene and clothing management.   Toilet transfer to toilet with [place].  Upper extremity exercise program x10 reps per handout, with independence.     Outcome: Ongoing (interventions implemented as appropriate)  Pt tearful post procedure; reports unable to drain abscess and also found another.  Having pain under L breast--states referred pain from thoracentesis--nurse notified.  Spoke to Dr Henry who reports no contraindications for heat or ice.  Pt preferred heat; hot packs given    Cont POC

## 2019-08-28 NOTE — PROGRESS NOTES
"[facility]  Adult Nutrition  Progress Note    SUMMARY       Recommendations    Recommendation:   1. Initiate Clear Liquid diet when medically acceptable.   2. Continue current TPN for now.    Goals:   Pt will tolerate TPN  Nutrition Goal Status: progressing towards goal  Communication of RD Recs: reviewed with RN(Skylar)    Reason for Assessment  Reason For Assessment: RD follow-up  Diagnosis: (sepsis)  Relevant Medical History: DM, anxiety, ADD, gastric bypass, expl lap, cholecystectomy  General Information Comments: Pt remains NPO. PEG tube. PICC line. Receiving custom TPN at 50ml/hr with IVFE 3xweekly. NFPE updated today -pt continues with mild wasting of temples & claviclesg of temples & clavicles.  Nutrition Discharge Planning: d/c diet to be determined    Nutrition Risk Screen  Nutrition Risk Screen: tube feeding or parenteral nutrition    Nutrition/Diet History  Food Preferences: no Yarsani or cultural food prefs identified  Spiritual, Cultural Beliefs, [employer]al Practices, Values that Affect Care: no  Factors Affecting Nutritional Intake: altered gastrointestinal function    Anthropometrics  Temp: 98.8 °F (37.1 °C)  Height Method: Stated  Height: 5' 5" (165.1 cm)  Height (inches): 65 in  Weight Method: Bed Scale  Weight: 91.6 kg (201 lb 15.1 oz)  Weight (lb): 201.94 lb  Ideal Body Weight (IBW), Female: 125 lb  % Ideal Body Weight, Female (lb): 134.74 lb  BMI (Calculated): 28.1  BMI Grade: 25 - 29.9 - overweight  Usual Body Weight (UBW), k.8 kg  % Usual Body Weight: 93.59  % Weight Change From Usual Weight: -6.6 %     Lab/Procedures/Meds  Pertinent Labs Reviewed: reviewed  Pertinent Labs Comments: Na 135L, Ca 8.1L, Alb 1.6L, PAB 14L  Pertinent Medications Reviewed: reviewed  Pertinent Medications Comments: tylenol, diflucan, Reglan    Estimated/Assessed Needs  Weight Used For Calorie Calculations: 90.5 kg (199 lb 8.3 oz)  Energy Calorie Requirements (kcal):   Energy Need " Method: [place](x1.4)  Protein Requirements: 90g (1.0g/kg)  Weight Used For Protein Calculations: 90.5 kg (199 lb 8.3 oz)  Estimated Fluid Requirement Method: RDA Method  RDA Method (mL): 2069     Nutrition Prescription Ordered  Current Diet Order: NPO  Current Nutrition Support Formula Ordered: (custom TPN)  Current Nutrition Support Rate Ordered: 60 (ml)  Current Nutrition Support Frequency Ordered: ml/hr  Oral Nutrition Supplement: IVFE 3xweekly    Evaluation of Received Nutrient/Fluid Intake  Parenteral Calories (kcal): 1282  Parenteral Protein (gm): 108  Parenteral Fluid (mL): 1200  Lipid Calories (kcals): 214 kcals  GIR (Glucose Infusion Rate) (mg/kg/min): 1.8 mg/kg/min  Total Calories (kcal): 1496  % Kcal Needs: 74  % Protein Needs: 120  I/O: 1870/2300  Energy Calories Required: not meeting needs  Protein Required: meeting needs  Fluid Required: meeting needs  Comments: LBM 8/28  % Intake of Estimated Energy Needs: 50 - 75 %  % Meal Intake: NPO    Nutrition Risk  Level of Risk/Frequency of Follow-up: (2xweekly)     Assessment and Plan  Protein calorie malnutrition  Malnutrition in the context of Acute Illness/Injury    Related to (etiology):  S/p surgery/sepsis    Signs and Symptoms (as evidenced by):  Energy Intake: <50% of estimated energy requirement for 1 month  Muscle Mass Depletion: mild depletion of temples, clavicle region and scapular region   Weight Loss: 6% x 1 month     Interventions:  Parenteral nutrition  Commercial beverage    Nutrition Diagnosis Status:  Continues       Monitor and Evaluation  Food and Nutrient Intake: food and beverage intake, parenteral nutrition intake  Food and Nutrient Adminstration: diet order, enteral and parenteral nutrition administration  Physical Activity and Function: nutrition-related ADLs and IADLs  Anthropometric Measurements: weight  Biochemical Data, Medical Tests and Procedures: electrolyte and renal panel  Nutrition-Focused Physical Findings: overall  appearance     Malnutrition Assessment  Malnutrition Type: acute illness or injury  Weight Loss (Malnutrition): greater than 5% in 1 month   [place] Region (Muscle Loss): mild depletion  Clavicle Bone Region (Muscle Loss): mild depletion   Subcutaneous Fat Loss (Final Summary): well nourished  Muscle Loss Evaluation (Final Summary): mild protein-calorie malnutrition       Nutrition Follow-Up  RD Follow-up?: Yes

## 2019-08-28 NOTE — PLAN OF CARE
Problem: Adult Inpatient Plan of Care  Goal: Plan of Care Review  Outcome: Ongoing (interventions implemented as appropriate)  Pt AAOx4, VSS, NAD. No complaints of N/V or headache. Complains of pain, pt has PCA pump at bedside. IV fluids and TPN infusing per MAR. Blood glucose monitored. Npo status maintained. Pt ambulated halls x3 during shift. Dressing changed. No other needs at this time. Safety maintained. Will continue to monitor.

## 2019-08-28 NOTE — PT/OT/SLP PROGRESS
Occupational Therapy   Treatment    Name: Parvin Corbin  MRN: [id number]  Admitting Diagnosis:  Sepsis  7 Days Post-Op    Recommendations:     Discharge Recommendations: home  Discharge Equipment Recommendations:  none  Barriers to discharge:  None    Assessment:     Parvin Corbin is a 44 y.o. female with a medical diagnosis of Sepsis.  She presents with performance deficits affecting function are weakness, impaired endurance, impaired self care skills, impaired functional mobilty, pain, decreased ROM, impaired skin.     Rehab Prognosis:  Good; patient would benefit from acute skilled OT services to address these deficits and reach maximum level of function.       Plan:     Patient to be seen 5 x/week to address the above listed problems via self-care/home management, therapeutic activities, therapeutic exercises  · Plan of Care Expires: 09/27/19  · Plan of Care Reviewed with: patient, spouse    Subjective     Pain/Comfort:  Pain Rating 1: 7/10  Location - Side 1: Left  Location - Orientation 1: lower  Location 1: breast  Pain Addressed 1: Pre-medicate for activity, Nurse notified, Distraction, Other (see comments)(hot pack given)    Objective:     Communicated with: nurse2 prior to session.  Patient found up in chair with oxygen, peripheral IV, PICC line upon OT entry to room.    General Precautions: Standard, fall      Occupational Performance:   [facility] 6 Click ADL: 18    Treatment & Education:  Pt tearful post procedure; reports unable to drain abscess and also found another.  Having pain under L breast--states referred pain from thoracentesis--nurse notified.  Spoke to Dr Henry who reports no contraindications for heat or ice.  Pt preferred heat; hot packs given    Patient left up in chair with all lines intact, call button in reach, nurse notified and  presentEducation:      GOALS:   Multidisciplinary Problems     Occupational Therapy Goals        Problem: Occupational Therapy Goal    Goal Priority Disciplines  Outcome Interventions   Occupational Therapy Goal     OT, PT/OT Ongoing (interventions implemented as appropriate)    Description:  Goals to be met by: 9/27     Patient will increase functional independence with ADLs by performing:    UE Dressing with [place]yman.  LE Dressing with Modified [place].  Grooming while standing with [place]yman.  Toileting from toilet with Lyman for hygiene and clothing management.   Toilet transfer to toilet with Lyman.  Upper extremity exercise program x10 reps per handout, with independence.                      Time Tracking:     OT Date of Treatment: 08/28/19  OT Start Time: 1447  OT Stop Time: 1457  OT Total Time (min): 10 min    Billable Minutes:Therapeutic Activity 10    Sánchez Smith OT  8/28/2019

## 2019-08-28 NOTE — PROGRESS NOTES
Reviewed telemetry strips: all sinus tachy not atrial flutter. Please consult if further help needed

## 2019-08-28 NOTE — PLAN OF CARE
Problem: Adult Inpatient Plan of Care  Goal: Plan of Care Review  Outcome: Ongoing (interventions implemented as appropriate)  Pt on RA with documented sats.96. ETCO2 38 mmHG. Patient achieving 1000 ml on incentive spirometer. Will continue to monitor.

## 2019-08-28 NOTE — PLAN OF CARE
Problem: Adult Inpatient Plan of Care  Goal: Plan of Care Review  Outcome: Ongoing (interventions implemented as appropriate)  Pt AAOx4. Spouse at bedside. Medications administered per order. IVFs running per MAR. PCA pump in use for pain. Pain in control through the night. Dressing changed. Pt ambulated to bedside commode, had a bowel movement (550 cc of loose stool x 1). Safety maintained. Will continue to monitor.

## 2019-08-28 NOTE — PLAN OF CARE
Problem: Parenteral Nutrition  Goal: Effective Intravenous Nutrition Therapy Delivery  Outcome: Ongoing (interventions implemented as appropriate)  Recommendation:   1. Initiate Clear Liquid diet when medically acceptable.   2. Continue current TPN for now.    Goals:   Pt will tolerate TPN  Nutrition Goal Status: progressing towards goal  Communication of RD Recs: reviewed with RN(Skylar)

## 2019-08-29 LAB
ALBUMIN SERPL BCP-MCNC: 1.5 G/DL (ref 3.5–5.2)
ALP SERPL-CCNC: 319 U/L (ref 55–135)
ALT SERPL W/O P-5'-P-CCNC: 36 U/L (ref 10–44)
ANION GAP SERPL CALC-SCNC: 9 MMOL/L (ref 8–16)
AST SERPL-CCNC: 22 U/L (ref 10–40)
BASOPHILS # BLD AUTO: 0.03 K/UL (ref 0–0.2)
BASOPHILS NFR BLD: 0.2 % (ref 0–1.9)
BILIRUB SERPL-MCNC: 0.3 MG/DL (ref 0.1–1)
BUN SERPL-MCNC: 17 MG/DL (ref 6–20)
CALCIUM SERPL-MCNC: 7.9 MG/DL (ref 8.7–10.5)
CHLORIDE SERPL-SCNC: 102 MMOL/L (ref 95–110)
CO2 SERPL-SCNC: 28 MMOL/L (ref 23–29)
CREAT SERPL-MCNC: 0.5 MG/DL (ref 0.5–1.4)
DIFFERENTIAL METHOD: ABNORMAL
EOSINOPHIL # BLD AUTO: 0.7 K/UL (ref 0–0.5)
EOSINOPHIL NFR BLD: 4.4 % (ref 0–8)
ERYTHROCYTE [DISTWIDTH] IN BLOOD BY AUTOMATED COUNT: 17.4 % (ref 11.5–14.5)
EST. GFR  (AFRICAN AMERICAN): >60 ML/MIN/1.73 M^2
EST. GFR  (NON AFRICAN AMERICAN): >60 ML/MIN/1.73 M^2
GLUCOSE SERPL-MCNC: 124 MG/DL (ref 70–110)
HCT VFR BLD AUTO: 24.2 % (ref 37–48.5)
HGB BLD-MCNC: 7.5 G/DL (ref 12–16)
LYMPHOCYTES # BLD AUTO: 1.4 K/UL (ref 1–4.8)
LYMPHOCYTES NFR BLD: 9 % (ref 18–48)
MAGNESIUM SERPL-MCNC: 1.8 MG/DL (ref 1.6–2.6)
MCH RBC QN AUTO: 25.4 PG (ref 27–31)
MCHC RBC AUTO-ENTMCNC: 31 G/DL (ref 32–36)
MCV RBC AUTO: 82 FL (ref 82–98)
MONOCYTES # BLD AUTO: 1.3 K/UL (ref 0.3–1)
MONOCYTES NFR BLD: 8.1 % (ref 4–15)
NEUTROPHILS # BLD AUTO: 11.8 K/UL (ref 1.8–7.7)
NEUTROPHILS NFR BLD: 78.3 % (ref 38–73)
PHOSPHATE SERPL-MCNC: 4.4 MG/DL (ref 2.7–4.5)
PLATELET # BLD AUTO: 424 K/UL (ref 150–350)
PMV BLD AUTO: 8.9 FL (ref 9.2–12.9)
POCT GLUCOSE: 109 MG/DL (ref 70–110)
POCT GLUCOSE: 133 MG/DL (ref 70–110)
POCT GLUCOSE: 140 MG/DL (ref 70–110)
POCT GLUCOSE: 145 MG/DL (ref 70–110)
POCT GLUCOSE: 77 MG/DL (ref 70–110)
POTASSIUM SERPL-SCNC: 3.5 MMOL/L (ref 3.5–5.1)
PREALB SERPL-MCNC: 5 MG/DL (ref 20–43)
PROT SERPL-MCNC: 5 G/DL (ref 6–8.4)
RBC # BLD AUTO: 2.95 M/UL (ref 4–5.4)
SODIUM SERPL-SCNC: 139 MMOL/L (ref 136–145)
WBC # BLD AUTO: 15.83 K/UL (ref 3.9–12.7)

## 2019-08-29 PROCEDURE — 25000003 PHARM REV CODE 250: Performed by: SURGERY

## 2019-08-29 PROCEDURE — 63600175 PHARM REV CODE 636 W HCPCS: Mod: JG | Performed by: STUDENT IN AN ORGANIZED HEALTH CARE EDUCATION/TRAINING PROGRAM

## 2019-08-29 PROCEDURE — 94761 N-INVAS EAR/PLS OXIMETRY MLT: CPT

## 2019-08-29 PROCEDURE — 84134 ASSAY OF PREALBUMIN: CPT

## 2019-08-29 PROCEDURE — 25000003 PHARM REV CODE 250: Performed by: STUDENT IN AN ORGANIZED HEALTH CARE EDUCATION/TRAINING PROGRAM

## 2019-08-29 PROCEDURE — 84100 ASSAY OF PHOSPHORUS: CPT

## 2019-08-29 PROCEDURE — 94799 UNLISTED PULMONARY SVC/PX: CPT

## 2019-08-29 PROCEDURE — 83735 ASSAY OF MAGNESIUM: CPT

## 2019-08-29 PROCEDURE — S0171 BUMETANIDE 0.5 MG: HCPCS | Performed by: SURGERY

## 2019-08-29 PROCEDURE — P9047 ALBUMIN (HUMAN), 25%, 50ML: HCPCS | Mod: JG | Performed by: STUDENT IN AN ORGANIZED HEALTH CARE EDUCATION/TRAINING PROGRAM

## 2019-08-29 PROCEDURE — 21400001 HC TELEMETRY ROOM

## 2019-08-29 PROCEDURE — A4217 STERILE WATER/SALINE, 500 ML: HCPCS | Performed by: STUDENT IN AN ORGANIZED HEALTH CARE EDUCATION/TRAINING PROGRAM

## 2019-08-29 PROCEDURE — 63600175 PHARM REV CODE 636 W HCPCS: Performed by: STUDENT IN AN ORGANIZED HEALTH CARE EDUCATION/TRAINING PROGRAM

## 2019-08-29 PROCEDURE — 97110 THERAPEUTIC EXERCISES: CPT

## 2019-08-29 PROCEDURE — 25000242 PHARM REV CODE 250 ALT 637 W/ HCPCS: Performed by: SURGERY

## 2019-08-29 PROCEDURE — 85025 COMPLETE CBC W/AUTO DIFF WBC: CPT

## 2019-08-29 PROCEDURE — 63600175 PHARM REV CODE 636 W HCPCS: Performed by: SURGERY

## 2019-08-29 PROCEDURE — 99900035 HC TECH TIME PER 15 MIN (STAT)

## 2019-08-29 PROCEDURE — 97530 THERAPEUTIC ACTIVITIES: CPT

## 2019-08-29 PROCEDURE — 80053 COMPREHEN METABOLIC PANEL: CPT

## 2019-08-29 RX ORDER — SPIRONOLACTONE 25 MG/1
25 TABLET ORAL 2 TIMES DAILY
Status: DISCONTINUED | OUTPATIENT
Start: 2019-08-29 | End: 2019-09-01 | Stop reason: HOSPADM

## 2019-08-29 RX ORDER — FUROSEMIDE 10 MG/ML
40 INJECTION INTRAMUSCULAR; INTRAVENOUS ONCE
Status: COMPLETED | OUTPATIENT
Start: 2019-08-29 | End: 2019-08-29

## 2019-08-29 RX ORDER — ACETAMINOPHEN 650 MG/20.3ML
650 LIQUID ORAL EVERY 8 HOURS
Status: DISPENSED | OUTPATIENT
Start: 2019-08-29 | End: 2019-08-31

## 2019-08-29 RX ORDER — POTASSIUM CHLORIDE 14.9 MG/ML
20 INJECTION INTRAVENOUS
Status: COMPLETED | OUTPATIENT
Start: 2019-08-29 | End: 2019-08-29

## 2019-08-29 RX ORDER — MAGNESIUM SULFATE HEPTAHYDRATE 40 MG/ML
2 INJECTION, SOLUTION INTRAVENOUS
Status: COMPLETED | OUTPATIENT
Start: 2019-08-29 | End: 2019-08-29

## 2019-08-29 RX ORDER — ALBUMIN HUMAN 250 G/1000ML
SOLUTION INTRAVENOUS CONTINUOUS
Status: DISCONTINUED | OUTPATIENT
Start: 2019-08-29 | End: 2019-08-30

## 2019-08-29 RX ADMIN — HYDROMORPHONE HYDROCHLORIDE 0.5 MG: 1 INJECTION, SOLUTION INTRAMUSCULAR; INTRAVENOUS; SUBCUTANEOUS at 02:08

## 2019-08-29 RX ADMIN — HYDROMORPHONE HYDROCHLORIDE 0.5 MG: 1 INJECTION, SOLUTION INTRAMUSCULAR; INTRAVENOUS; SUBCUTANEOUS at 06:08

## 2019-08-29 RX ADMIN — POTASSIUM CHLORIDE 20 MEQ: 200 INJECTION, SOLUTION INTRAVENOUS at 12:08

## 2019-08-29 RX ADMIN — PANTOPRAZOLE SODIUM 40 MG: 40 TABLET, DELAYED RELEASE ORAL at 09:08

## 2019-08-29 RX ADMIN — OXYCODONE AND ACETAMINOPHEN 1 TABLET: 5; 325 TABLET ORAL at 09:08

## 2019-08-29 RX ADMIN — ACETAMINOPHEN 650 MG: 650 SOLUTION ORAL at 10:08

## 2019-08-29 RX ADMIN — SOMATROPIN 10 MG: KIT at 03:08

## 2019-08-29 RX ADMIN — PREGABALIN 75 MG: 75 CAPSULE ORAL at 09:08

## 2019-08-29 RX ADMIN — AZITHROMYCIN MONOHYDRATE 500 MG: 500 INJECTION, POWDER, LYOPHILIZED, FOR SOLUTION INTRAVENOUS at 03:08

## 2019-08-29 RX ADMIN — SPIRONOLACTONE 25 MG: 25 TABLET, FILM COATED ORAL at 12:08

## 2019-08-29 RX ADMIN — ENOXAPARIN SODIUM 40 MG: 100 INJECTION SUBCUTANEOUS at 03:08

## 2019-08-29 RX ADMIN — SPIRONOLACTONE 25 MG: 25 TABLET, FILM COATED ORAL at 10:08

## 2019-08-29 RX ADMIN — MICONAZOLE NITRATE: 20 POWDER TOPICAL at 10:08

## 2019-08-29 RX ADMIN — CYCLOBENZAPRINE HYDROCHLORIDE 5 MG: 5 TABLET, FILM COATED ORAL at 09:08

## 2019-08-29 RX ADMIN — SODIUM CHLORIDE: 234 INJECTION INTRAMUSCULAR; INTRAVENOUS; SUBCUTANEOUS at 03:08

## 2019-08-29 RX ADMIN — ALBUMIN (HUMAN): 25 SOLUTION INTRAVENOUS at 03:08

## 2019-08-29 RX ADMIN — IRON SUCROSE 100 MG: 20 INJECTION, SOLUTION INTRAVENOUS at 09:08

## 2019-08-29 RX ADMIN — OXYCODONE AND ACETAMINOPHEN 1 TABLET: 5; 325 TABLET ORAL at 01:08

## 2019-08-29 RX ADMIN — CYANOCOBALAMIN 1000 MCG: 1000 INJECTION, SOLUTION INTRAMUSCULAR; SUBCUTANEOUS at 09:08

## 2019-08-29 RX ADMIN — KETOROLAC TROMETHAMINE 15 MG: 30 INJECTION, SOLUTION INTRAMUSCULAR at 03:08

## 2019-08-29 RX ADMIN — METOCLOPRAMIDE HYDROCHLORIDE 10 MG: 10 TABLET ORAL at 03:08

## 2019-08-29 RX ADMIN — ALBUMIN (HUMAN): 25 SOLUTION INTRAVENOUS at 11:08

## 2019-08-29 RX ADMIN — KETOROLAC TROMETHAMINE 15 MG: 30 INJECTION, SOLUTION INTRAMUSCULAR at 10:08

## 2019-08-29 RX ADMIN — KETOROLAC TROMETHAMINE 15 MG: 30 INJECTION, SOLUTION INTRAMUSCULAR at 05:08

## 2019-08-29 RX ADMIN — POTASSIUM CHLORIDE 20 MEQ: 200 INJECTION, SOLUTION INTRAVENOUS at 09:08

## 2019-08-29 RX ADMIN — OXYCODONE AND ACETAMINOPHEN 1 TABLET: 5; 325 TABLET ORAL at 06:08

## 2019-08-29 RX ADMIN — FUROSEMIDE 40 MG: 10 INJECTION, SOLUTION INTRAVENOUS at 12:08

## 2019-08-29 RX ADMIN — METOCLOPRAMIDE HYDROCHLORIDE 10 MG: 10 TABLET ORAL at 12:08

## 2019-08-29 RX ADMIN — ALBUMIN (HUMAN): 25 SOLUTION INTRAVENOUS at 12:08

## 2019-08-29 RX ADMIN — MAGNESIUM SULFATE IN WATER 2 G: 40 INJECTION, SOLUTION INTRAVENOUS at 09:08

## 2019-08-29 RX ADMIN — MICONAZOLE NITRATE: 20 POWDER TOPICAL at 09:08

## 2019-08-29 RX ADMIN — BUMETANIDE 1 MG: 0.25 INJECTION INTRAMUSCULAR; INTRAVENOUS at 09:08

## 2019-08-29 RX ADMIN — OXYCODONE AND ACETAMINOPHEN 1 TABLET: 5; 325 TABLET ORAL at 04:08

## 2019-08-29 RX ADMIN — POLYETHYLENE GLYCOL 3350 17 G: 17 POWDER, FOR SOLUTION ORAL at 09:08

## 2019-08-29 RX ADMIN — CYCLOBENZAPRINE HYDROCHLORIDE 5 MG: 5 TABLET, FILM COATED ORAL at 03:08

## 2019-08-29 RX ADMIN — ACETAMINOPHEN 650 MG: 650 SUPPOSITORY RECTAL at 05:08

## 2019-08-29 RX ADMIN — METOCLOPRAMIDE HYDROCHLORIDE 10 MG: 10 TABLET ORAL at 05:08

## 2019-08-29 NOTE — PLAN OF CARE
Case discussed with Dr. De Los Santos this am and plan is for pt to d/c tomorrow and resume hh and home TPN.Pt agrees with plan and is concerned about getting TPN on time for d/c. Tn reassured pt that orders will be sent and [employer] notified    Pt current with [facility] for home TPN . Tn notified  Chavo with [facility]  of plan to d/c tomorrow and TN sent hh orders to [facility] and [facility] via [facility].    Tn updated white board and encourage pt to call with needs/concerns.    1615- Tn spoke to Chavo at [facility] to confirm pt will have TPN authorization for d/c tomorrow.Chavo informed Tn pt has authorization on file!       08/29/19 1314   Post-Acute Status   Post-Acute Authorization Placement   Post-Acute Placement Status Referrals Sent

## 2019-08-29 NOTE — PLAN OF CARE
Attempted to perform Virtual round, pt working with therapy at this time, will be available to intervene if needed.

## 2019-08-29 NOTE — PLAN OF CARE
Problem: Adult Inpatient Plan of Care  Goal: Plan of Care Review  Outcome: Ongoing (interventions implemented as appropriate)  Pt on RA with documented sats.96. Patient achieving 1250 ml on incentive spirometer. Will continue to monitor.

## 2019-08-29 NOTE — PLAN OF CARE
Problem: Occupational Therapy Goal  Goal: Occupational Therapy Goal  Goals to be met by: 9/27     Patient will increase functional independence with ADLs by performing:    UE Dressing with [place].  LE Dressing with Modified [place].  Grooming while standing with [place]. Met 8/29  Toileting from toilet with [place] for hygiene and clothing management.   Toilet transfer to toilet with [place].  Met 8/29  Upper extremity exercise program x10 reps per handout, with independence.     Outcome: Ongoing (interventions implemented as appropriate)  Progressing toward goals, increased indep bed mobility, tf.  Good understanding of HEP, bed mobility and positioning    Cont POC

## 2019-08-29 NOTE — PROGRESS NOTES
Neuroendocrine Surgery  Progress Note    Hospital course: 43 y.o. woman transferred from OSH with abdominal pain and sepsis (GNR). 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  Hospital stay complicated by bilateral subsegmental pulmonary embolisms. Now s/p eddie-en-y gastric bypass takedown, jejunostomy take down and reanastomosis with reestablishment of gastrointestinal continuity on 8/21/2019. Patient had left pleural effusion and RUQ drained by IR on 8/28/2019.    S:  NAEON.  IR drained RUQ and L pleural effusion yesterday  Persistently tachycardic  No nausea/vomiting  No chest pain/SOB    O:  Temp:  [97.9 °F (36.6 °C)-99.9 °F (37.7 °C)] 97.9 °F (36.6 °C)  Pulse:  [102-127] 103  Resp:  [16-24] 20  SpO2:  [96 %-100 %] 98 %  BP: (108-140)/(70-87) 115/70    Physical Exam:  Gen: Alert and oriented x3. Appears mildly uncomfortable in bed  HEENT: normocephalic and atraumatic. EOMI.   Resp: Comfortable on room air  CV: Tachycardiac  Abd: Incision intact. Dressing on the left lower extremity clean dry and intact recently changed this AM, G-tube in place  Ext: warm and well perfused  MSK: normal range of motion, normal strength  Neuro: no focal deficits, normal sensation    I/O:  Intake/Output - Last 3 Shifts       08/27 0700 - 08/28 0659 08/28 0700 - 08/29 0659 08/29 0700 - 08/30 0659    P.O.  450     I.V. (mL/kg) 100 (1.1) 200 (2.1)     NG/GT 60 60     IV Piggyback 650 350     TPN 1060 1818.9     Total Intake(mL/kg) 1870 (20.4) 2878.9 (30.9)     Urine (mL/kg/hr) 1750 (0.8) 1550 (0.7)     Drains  10     Stool 550      Total Output 2300 1560     Net -430 +1318.9                CBC:   Recent Labs     08/27/19  0634 08/28/19  0440 08/29/19  0401   WBC 16.60* 17.18* 15.83*   HGB 8.3* 8.1* 7.5*   HCT 26.7* 26.4* 24.2*   * 468* 424*      CMP:  Recent Labs     08/27/19  0634 08/28/19  0440 08/29/19  0401   * 137 139   K 3.5 3.9 3.5    101 102   CO2 24 27 28    104 124*   BUN 11 10 17   CREATININE 0.5 0.5 0.5   CALCIUM 8.1* 8.2* 7.9*   BILITOT 0.3 0.4 0.3   ALKPHOS 326* 359* 319*   AST 41* 25 22   ALT 59* 44 36   ANIONGAP 8 9 9     Recent Labs     08/27/19  0634 08/28/19  0440 08/29/19  0401   MG 1.5* 1.7 1.8   PHOS 3.7 4.5 4.4     Prealbumin 5    A/P: 44 y.o. female admitted 8/8/2019 with history of eddie-en-Y gastric bypass 13 years ago, DM, & small bowel volvulus s/p ex lap with extensive small bowel resection and end jejunostomy approx 1 month PTA from ex-lap with esophagogastrostomy, jejuno-jejunostomy, appendectomy.     Clear liquid diet with G tube clamped   Vent G tube as needed for distention   Continue scheduled Gabapentin and Flexeril and rectal tylenol. Discontinue dilaudid PCA today.   Continue Cefepime and Fluconazole. Start azithromycin for pneumonia coverage   Continue TPN and Lipids 24 hours a day, severe protein-calorie malnutrition. Prealbumin 5 today.   BID dressing changes to left abdomen previous ostomy site   Electrolyte replacement K to 4, PO4 to 3, Mg to 2    Myles De Los Santos MD   [employer] General Surgery, PGY-2  08/29/2019

## 2019-08-29 NOTE — NURSING
Spoke with Dr. Henry about lab not being able to use sample that was sent to them. No new orders were given.

## 2019-08-29 NOTE — PROGRESS NOTES
Pharmacy New Medication Education    Patient and/or Caregiver ACCEPTED medication education.    Pharmacy has provided education on the name, indication, and possible side effects of the medication(s) prescribed, using teach-back method.     Learners of pharmacy medication education includes:  patient and spouse    Medication Indication Side Effects   spironolactone Diuretic, excess  fluid removal Increased urination, decreased blood pressure, increased serum  potassium    bumetanide Diuretic, excess  fluid removal increased urination, electrolyte abnormalities        The following medications have also been discussed, during this admission.     Current Facility-Administered Medications   Medication Frequency    0.9%  NaCl infusion (for blood administration) Q24H PRN    acetaminophen oral solution 650 mg Q8H    albumin human 25% bottle Continuous    azithromycin 500 mg in dextrose 5 % 250 mL IVPB (ready to mix system) Q24H    bacitracin ointment 14 g TID PRN    bisacodyl suppository 10 mg Daily    bumetanide injection 1 mg Daily    cyanocobalamin injection 1,000 mcg Daily    cyclobenzaprine tablet 5 mg TID    dextrose 10% (D10W) Bolus PRN    dextrose 10% (D10W) Bolus PRN    diphenhydrAMINE capsule 25 mg Q6H PRN    enoxaparin injection 40 mg Q24H    glucagon (human recombinant) injection 1 mg PRN    insulin aspart U-100 pen 0-5 Units Q6H PRN    iron sucrose (VENOFER) 100 mg in sodium chloride 0.9% 100 mL IVPB Daily    ketorolac injection 15 mg Q6H    metoclopramide HCl tablet 10 mg TID AC    miconazole NITRATE 2 % top powder BID    naloxone 0.4 mg/mL injection 0.02 mg PRN    ondansetron injection 8 mg Q6H PRN    oxyCODONE-acetaminophen 5-325 mg per tablet 1 tablet Q4H PRN    pantoprazole EC tablet 40 mg Daily    polyethylene glycol packet 17 g Daily    pregabalin capsule 75 mg BID    promethazine (PHENERGAN) 25 mg in dextrose 5 % 50 mL IVPB Q6H PRN    promethazine (PHENERGAN) 6.25 mg in dextrose 5 % 50 mL IVPB Q6H PRN     sodium chloride 0.9% flush 10 mL PRN    somatropin SolR 10 mg Daily    spironolactone tablet 25 mg BID    TPN ADULT CENTRAL LINE CUSTOM Continuous    zolpidem tablet 5 mg Nightly PRN          Thank you  Linda Giles, PharmD

## 2019-08-29 NOTE — PT/OT/SLP PROGRESS
Occupational Therapy   Treatment    Name: Parvin Corbin  MRN: [id number]  Admitting Diagnosis:  Sepsis  8 Days Post-Op    Recommendations:     Discharge Recommendations: home health PT, home health OT  Discharge Equipment Recommendations:  shower chair  Barriers to discharge:  None    Assessment:     Parvin Corbin is a 44 y.o. female with a medical diagnosis of Sepsis.  She presents with performance deficits affecting function are weakness, impaired endurance, impaired self care skills, impaired functional mobilty, gait instability, impaired balance, pain, decreased ROM, impaired skin.     Rehab Prognosis:  Good; patient would benefit from acute skilled OT services to address these deficits and reach maximum level of function.       Plan:     Patient to be seen 5 x/week to address the above listed problems via self-care/home management, therapeutic activities, therapeutic exercises  · Plan of Care Expires: 09/27/19  · Plan of Care Reviewed with: patient, spouse    Subjective     Pain/Comfort:  · Pain Rating 1: 5/10  · Location - Orientation 1: generalized  · Location 1: abdomen  · Pain Addressed 1: Pre-medicate for activity, Cessation of Activity, Reposition, Nurse notified, Distraction  · Pain Rating Post-Intervention 1: 5/10    Objective:     Communicated with: nurse prior to session.  Patient found up in chair with PICC line, telemetry upon OT entry to room.    General Precautions: Standard, fall   Orthopedic Precautions:    Braces:       Occupational Performance:     Bed Mobility:    · Patient completed Rolling/Turning to Left with  modified independence  · Patient completed Rolling/Turning to Right with modified independence  · Patient completed Scooting/Bridging with modified independence  · Patient completed Supine to Sit with modified independence  · Patient completed Sit to Supine with modified independence     Functional Mobility/Transfers:  · Patient completed Sit <> Stand Transfer with modified independence   with  no assistive device   · Patient completed Bed <> Chair Transfer using Step Transfer technique with modified independence and supervision with no assistive device  · Patient completed Toilet Transfer Step Transfer technique with modified independence and supervision with  no AD  · Functional Mobility: SBA-supervision no AD in room w/focus on postural awareness    Activities of Daily Living:  · Lower Body Dressing: supervision socks      [facility] 6 Click ADL: 20    Treatment & Education:  Pt/spouse educated on sleep hygiene and positioning, use of pillows and support, car travel recs and positioning, performed postural awareness, core activation seated and standing    Patient left up in chair with all lines intact, call button in reach and spouse presentEducation:      GOALS:   Multidisciplinary Problems     Occupational Therapy Goals        Problem: Occupational Therapy Goal    Goal Priority Disciplines Outcome Interventions   Occupational Therapy Goal     OT, PT/OT Ongoing (interventions implemented as appropriate)    Description:  Goals to be met by: 9/27     Patient will increase functional independence with ADLs by performing:    UE Dressing with [place].  LE Dressing with Modified [place].  Grooming while standing with [place]. Met 8/29  Toileting from toilet with [place] for hygiene and clothing management.   Toilet transfer to toilet with [place].  Met 8/29  Upper extremity exercise program x10 reps per handout, with independence.                       Time Tracking:     OT Date of Treatment: 08/29/19  OT Start Time: 1027  OT Stop Time: 1124  OT Total Time (min): 57 min    Billable Minutes:Therapeutic Activity 30  Therapeutic Exercise 27    Sánchez Smith OT  8/29/2019

## 2019-08-30 LAB
ALBUMIN SERPL BCP-MCNC: 2.1 G/DL (ref 3.5–5.2)
ALBUMIN SERPL BCP-MCNC: 2.1 G/DL (ref 3.5–5.2)
ALP SERPL-CCNC: 362 U/L (ref 55–135)
ALP SERPL-CCNC: 362 U/L (ref 55–135)
ALT SERPL W/O P-5'-P-CCNC: 30 U/L (ref 10–44)
ALT SERPL W/O P-5'-P-CCNC: 30 U/L (ref 10–44)
ANION GAP SERPL CALC-SCNC: 7 MMOL/L (ref 8–16)
ANION GAP SERPL CALC-SCNC: 7 MMOL/L (ref 8–16)
ANISOCYTOSIS BLD QL SMEAR: SLIGHT
ANISOCYTOSIS BLD QL SMEAR: SLIGHT
AST SERPL-CCNC: 19 U/L (ref 10–40)
AST SERPL-CCNC: 19 U/L (ref 10–40)
BASOPHILS # BLD AUTO: 0.02 K/UL (ref 0–0.2)
BASOPHILS # BLD AUTO: 0.02 K/UL (ref 0–0.2)
BASOPHILS NFR BLD: 0.1 % (ref 0–1.9)
BASOPHILS NFR BLD: 0.1 % (ref 0–1.9)
BILIRUB FLD-MCNC: 0.2 MG/DL
BILIRUB FLD-MCNC: 0.7 MG/DL
BILIRUB SERPL-MCNC: 0.3 MG/DL (ref 0.1–1)
BILIRUB SERPL-MCNC: 0.3 MG/DL (ref 0.1–1)
BILIRUB UR QL STRIP: NEGATIVE
BUN SERPL-MCNC: 9 MG/DL (ref 6–20)
BUN SERPL-MCNC: 9 MG/DL (ref 6–20)
CALCIUM SERPL-MCNC: 8.4 MG/DL (ref 8.7–10.5)
CALCIUM SERPL-MCNC: 8.4 MG/DL (ref 8.7–10.5)
CHLORIDE SERPL-SCNC: 102 MMOL/L (ref 95–110)
CHLORIDE SERPL-SCNC: 102 MMOL/L (ref 95–110)
CLARITY UR: CLEAR
CO2 SERPL-SCNC: 29 MMOL/L (ref 23–29)
CO2 SERPL-SCNC: 29 MMOL/L (ref 23–29)
COLOR UR: YELLOW
CREAT SERPL-MCNC: 0.5 MG/DL (ref 0.5–1.4)
CREAT SERPL-MCNC: 0.5 MG/DL (ref 0.5–1.4)
DIFFERENTIAL METHOD: ABNORMAL
DIFFERENTIAL METHOD: ABNORMAL
EOSINOPHIL # BLD AUTO: 0.3 K/UL (ref 0–0.5)
EOSINOPHIL # BLD AUTO: 0.3 K/UL (ref 0–0.5)
EOSINOPHIL NFR BLD: 2 % (ref 0–8)
EOSINOPHIL NFR BLD: 2 % (ref 0–8)
ERYTHROCYTE [DISTWIDTH] IN BLOOD BY AUTOMATED COUNT: 17.3 % (ref 11.5–14.5)
ERYTHROCYTE [DISTWIDTH] IN BLOOD BY AUTOMATED COUNT: 17.3 % (ref 11.5–14.5)
EST. GFR  (AFRICAN AMERICAN): >60 ML/MIN/1.73 M^2
EST. GFR  (AFRICAN AMERICAN): >60 ML/MIN/1.73 M^2
EST. GFR  (NON AFRICAN AMERICAN): >60 ML/MIN/1.73 M^2
EST. GFR  (NON AFRICAN AMERICAN): >60 ML/MIN/1.73 M^2
GLUCOSE SERPL-MCNC: 150 MG/DL (ref 70–110)
GLUCOSE SERPL-MCNC: 150 MG/DL (ref 70–110)
GLUCOSE UR QL STRIP: NEGATIVE
HCT VFR BLD AUTO: 22.9 % (ref 37–48.5)
HCT VFR BLD AUTO: 22.9 % (ref 37–48.5)
HGB BLD-MCNC: 7.1 G/DL (ref 12–16)
HGB BLD-MCNC: 7.1 G/DL (ref 12–16)
HGB UR QL STRIP: NEGATIVE
HYPOCHROMIA BLD QL SMEAR: ABNORMAL
HYPOCHROMIA BLD QL SMEAR: ABNORMAL
KETONES UR QL STRIP: NEGATIVE
LEUKOCYTE ESTERASE UR QL STRIP: NEGATIVE
LYMPHOCYTES # BLD AUTO: 1.2 K/UL (ref 1–4.8)
LYMPHOCYTES # BLD AUTO: 1.2 K/UL (ref 1–4.8)
LYMPHOCYTES NFR BLD: 7.3 % (ref 18–48)
LYMPHOCYTES NFR BLD: 7.3 % (ref 18–48)
MAGNESIUM SERPL-MCNC: 1.5 MG/DL (ref 1.6–2.6)
MAGNESIUM SERPL-MCNC: 1.5 MG/DL (ref 1.6–2.6)
MCH RBC QN AUTO: 25.3 PG (ref 27–31)
MCH RBC QN AUTO: 25.3 PG (ref 27–31)
MCHC RBC AUTO-ENTMCNC: 31 G/DL (ref 32–36)
MCHC RBC AUTO-ENTMCNC: 31 G/DL (ref 32–36)
MCV RBC AUTO: 82 FL (ref 82–98)
MCV RBC AUTO: 82 FL (ref 82–98)
MONOCYTES # BLD AUTO: 1.3 K/UL (ref 0.3–1)
MONOCYTES # BLD AUTO: 1.3 K/UL (ref 0.3–1)
MONOCYTES NFR BLD: 8 % (ref 4–15)
MONOCYTES NFR BLD: 8 % (ref 4–15)
NEUTROPHILS # BLD AUTO: 13 K/UL (ref 1.8–7.7)
NEUTROPHILS # BLD AUTO: 13 K/UL (ref 1.8–7.7)
NEUTROPHILS NFR BLD: 82.6 % (ref 38–73)
NEUTROPHILS NFR BLD: 82.6 % (ref 38–73)
NITRITE UR QL STRIP: NEGATIVE
OVALOCYTES BLD QL SMEAR: ABNORMAL
OVALOCYTES BLD QL SMEAR: ABNORMAL
PH UR STRIP: 7 [PH] (ref 5–8)
PHOSPHATE SERPL-MCNC: 4.1 MG/DL (ref 2.7–4.5)
PHOSPHATE SERPL-MCNC: 4.1 MG/DL (ref 2.7–4.5)
PLATELET # BLD AUTO: 454 K/UL (ref 150–350)
PLATELET # BLD AUTO: 454 K/UL (ref 150–350)
PLATELET BLD QL SMEAR: ABNORMAL
PLATELET BLD QL SMEAR: ABNORMAL
PMV BLD AUTO: 8.9 FL (ref 9.2–12.9)
PMV BLD AUTO: 8.9 FL (ref 9.2–12.9)
POCT GLUCOSE: 142 MG/DL (ref 70–110)
POCT GLUCOSE: 145 MG/DL (ref 70–110)
POCT GLUCOSE: 149 MG/DL (ref 70–110)
POCT GLUCOSE: 172 MG/DL (ref 70–110)
POIKILOCYTOSIS BLD QL SMEAR: SLIGHT
POIKILOCYTOSIS BLD QL SMEAR: SLIGHT
POLYCHROMASIA BLD QL SMEAR: ABNORMAL
POLYCHROMASIA BLD QL SMEAR: ABNORMAL
POTASSIUM SERPL-SCNC: 3.3 MMOL/L (ref 3.5–5.1)
POTASSIUM SERPL-SCNC: 3.3 MMOL/L (ref 3.5–5.1)
PROT SERPL-MCNC: 5.5 G/DL (ref 6–8.4)
PROT SERPL-MCNC: 5.5 G/DL (ref 6–8.4)
PROT UR QL STRIP: NEGATIVE
RBC # BLD AUTO: 2.81 M/UL (ref 4–5.4)
RBC # BLD AUTO: 2.81 M/UL (ref 4–5.4)
SODIUM SERPL-SCNC: 138 MMOL/L (ref 136–145)
SODIUM SERPL-SCNC: 138 MMOL/L (ref 136–145)
SP GR UR STRIP: 1.01 (ref 1–1.03)
SPECIMEN SOURCE: NORMAL
SPECIMEN SOURCE: NORMAL
TRIGL SERPL-MCNC: 121 MG/DL (ref 30–150)
URN SPEC COLLECT METH UR: NORMAL
UROBILINOGEN UR STRIP-ACNC: NEGATIVE EU/DL
WBC # BLD AUTO: 16.11 K/UL (ref 3.9–12.7)
WBC # BLD AUTO: 16.11 K/UL (ref 3.9–12.7)

## 2019-08-30 PROCEDURE — 25000003 PHARM REV CODE 250: Performed by: STUDENT IN AN ORGANIZED HEALTH CARE EDUCATION/TRAINING PROGRAM

## 2019-08-30 PROCEDURE — 25000003 PHARM REV CODE 250: Performed by: SURGERY

## 2019-08-30 PROCEDURE — 94761 N-INVAS EAR/PLS OXIMETRY MLT: CPT

## 2019-08-30 PROCEDURE — 63600175 PHARM REV CODE 636 W HCPCS: Performed by: SURGERY

## 2019-08-30 PROCEDURE — 25000242 PHARM REV CODE 250 ALT 637 W/ HCPCS: Performed by: SURGERY

## 2019-08-30 PROCEDURE — A4217 STERILE WATER/SALINE, 500 ML: HCPCS | Performed by: STUDENT IN AN ORGANIZED HEALTH CARE EDUCATION/TRAINING PROGRAM

## 2019-08-30 PROCEDURE — 87103 BLOOD FUNGUS CULTURE: CPT

## 2019-08-30 PROCEDURE — S0171 BUMETANIDE 0.5 MG: HCPCS | Performed by: SURGERY

## 2019-08-30 PROCEDURE — B4185 PARENTERAL SOL 10 GM LIPIDS: HCPCS | Performed by: SURGERY

## 2019-08-30 PROCEDURE — 85025 COMPLETE CBC W/AUTO DIFF WBC: CPT

## 2019-08-30 PROCEDURE — 63600175 PHARM REV CODE 636 W HCPCS: Performed by: STUDENT IN AN ORGANIZED HEALTH CARE EDUCATION/TRAINING PROGRAM

## 2019-08-30 PROCEDURE — 84478 ASSAY OF TRIGLYCERIDES: CPT

## 2019-08-30 PROCEDURE — 83735 ASSAY OF MAGNESIUM: CPT

## 2019-08-30 PROCEDURE — 63600175 PHARM REV CODE 636 W HCPCS: Mod: JG | Performed by: STUDENT IN AN ORGANIZED HEALTH CARE EDUCATION/TRAINING PROGRAM

## 2019-08-30 PROCEDURE — 80053 COMPREHEN METABOLIC PANEL: CPT

## 2019-08-30 PROCEDURE — 36415 COLL VENOUS BLD VENIPUNCTURE: CPT

## 2019-08-30 PROCEDURE — 84100 ASSAY OF PHOSPHORUS: CPT

## 2019-08-30 PROCEDURE — 81003 URINALYSIS AUTO W/O SCOPE: CPT

## 2019-08-30 PROCEDURE — 86920 COMPATIBILITY TEST SPIN: CPT

## 2019-08-30 PROCEDURE — 97530 THERAPEUTIC ACTIVITIES: CPT

## 2019-08-30 PROCEDURE — 21400001 HC TELEMETRY ROOM

## 2019-08-30 PROCEDURE — 86850 RBC ANTIBODY SCREEN: CPT

## 2019-08-30 PROCEDURE — 87040 BLOOD CULTURE FOR BACTERIA: CPT

## 2019-08-30 PROCEDURE — P9047 ALBUMIN (HUMAN), 25%, 50ML: HCPCS | Mod: JG | Performed by: STUDENT IN AN ORGANIZED HEALTH CARE EDUCATION/TRAINING PROGRAM

## 2019-08-30 RX ORDER — MAGNESIUM SULFATE HEPTAHYDRATE 40 MG/ML
2 INJECTION, SOLUTION INTRAVENOUS ONCE
Status: COMPLETED | OUTPATIENT
Start: 2019-08-30 | End: 2019-08-30

## 2019-08-30 RX ORDER — POTASSIUM CHLORIDE 20 MEQ/1
40 TABLET, EXTENDED RELEASE ORAL
Status: COMPLETED | OUTPATIENT
Start: 2019-08-30 | End: 2019-08-30

## 2019-08-30 RX ORDER — HYDROCODONE BITARTRATE AND ACETAMINOPHEN 500; 5 MG/1; MG/1
TABLET ORAL
Status: DISCONTINUED | OUTPATIENT
Start: 2019-08-30 | End: 2019-09-01 | Stop reason: HOSPADM

## 2019-08-30 RX ADMIN — ALBUMIN (HUMAN): 25 SOLUTION INTRAVENOUS at 03:08

## 2019-08-30 RX ADMIN — SOMATROPIN 10 MG: KIT at 10:08

## 2019-08-30 RX ADMIN — OXYCODONE AND ACETAMINOPHEN 1 TABLET: 5; 325 TABLET ORAL at 06:08

## 2019-08-30 RX ADMIN — MICONAZOLE NITRATE: 20 POWDER TOPICAL at 10:08

## 2019-08-30 RX ADMIN — CYCLOBENZAPRINE HYDROCHLORIDE 5 MG: 5 TABLET, FILM COATED ORAL at 10:08

## 2019-08-30 RX ADMIN — METOCLOPRAMIDE HYDROCHLORIDE 10 MG: 10 TABLET ORAL at 06:08

## 2019-08-30 RX ADMIN — IRON SUCROSE 100 MG: 20 INJECTION, SOLUTION INTRAVENOUS at 10:08

## 2019-08-30 RX ADMIN — BUMETANIDE 1 MG: 0.25 INJECTION INTRAMUSCULAR; INTRAVENOUS at 10:08

## 2019-08-30 RX ADMIN — I.V. FAT EMULSION 250 ML: 20 EMULSION INTRAVENOUS at 09:08

## 2019-08-30 RX ADMIN — AZITHROMYCIN MONOHYDRATE 500 MG: 500 INJECTION, POWDER, LYOPHILIZED, FOR SOLUTION INTRAVENOUS at 03:08

## 2019-08-30 RX ADMIN — METOCLOPRAMIDE HYDROCHLORIDE 10 MG: 10 TABLET ORAL at 05:08

## 2019-08-30 RX ADMIN — ALBUMIN (HUMAN): 25 SOLUTION INTRAVENOUS at 06:08

## 2019-08-30 RX ADMIN — SPIRONOLACTONE 25 MG: 25 TABLET, FILM COATED ORAL at 10:08

## 2019-08-30 RX ADMIN — PREGABALIN 75 MG: 75 CAPSULE ORAL at 08:08

## 2019-08-30 RX ADMIN — PREGABALIN 75 MG: 75 CAPSULE ORAL at 10:08

## 2019-08-30 RX ADMIN — POTASSIUM CHLORIDE 40 MEQ: 20 TABLET, EXTENDED RELEASE ORAL at 03:08

## 2019-08-30 RX ADMIN — OXYCODONE AND ACETAMINOPHEN 1 TABLET: 5; 325 TABLET ORAL at 12:08

## 2019-08-30 RX ADMIN — MICONAZOLE NITRATE: 20 POWDER TOPICAL at 08:08

## 2019-08-30 RX ADMIN — ACETAMINOPHEN 650 MG: 650 SOLUTION ORAL at 06:08

## 2019-08-30 RX ADMIN — MAGNESIUM SULFATE IN WATER 2 G: 40 INJECTION, SOLUTION INTRAVENOUS at 03:08

## 2019-08-30 RX ADMIN — METOCLOPRAMIDE HYDROCHLORIDE 10 MG: 10 TABLET ORAL at 11:08

## 2019-08-30 RX ADMIN — OXYCODONE AND ACETAMINOPHEN 1 TABLET: 5; 325 TABLET ORAL at 02:08

## 2019-08-30 RX ADMIN — CALCIUM GLUCONATE: 94 INJECTION, SOLUTION INTRAVENOUS at 06:08

## 2019-08-30 RX ADMIN — CYCLOBENZAPRINE HYDROCHLORIDE 5 MG: 5 TABLET, FILM COATED ORAL at 02:08

## 2019-08-30 RX ADMIN — POLYETHYLENE GLYCOL 3350 17 G: 17 POWDER, FOR SOLUTION ORAL at 10:08

## 2019-08-30 RX ADMIN — SPIRONOLACTONE 25 MG: 25 TABLET, FILM COATED ORAL at 08:08

## 2019-08-30 RX ADMIN — ENOXAPARIN SODIUM 40 MG: 100 INJECTION SUBCUTANEOUS at 05:08

## 2019-08-30 RX ADMIN — CYANOCOBALAMIN 1000 MCG: 1000 INJECTION, SOLUTION INTRAMUSCULAR; SUBCUTANEOUS at 10:08

## 2019-08-30 RX ADMIN — PANTOPRAZOLE SODIUM 40 MG: 40 TABLET, DELAYED RELEASE ORAL at 10:08

## 2019-08-30 RX ADMIN — CYCLOBENZAPRINE HYDROCHLORIDE 5 MG: 5 TABLET, FILM COATED ORAL at 08:08

## 2019-08-30 NOTE — PROGRESS NOTES
2:40 pm, TN spoke with [facility], they have everything they need to prepare pt's TPN, they have spoken with Dr Fuchs's team/distician. Awaiting discharge orders, TN will notify [facility] when they are entered.    TN met bedside with pt and pt's , Fabiano. [facility] rep, [facility] contacted [place] and stated that their [place] office will be providing the pt's TPN and will deliver it to pt's home once they discharge ([employer] note for updated TPN requirements sent to [facility] via [employer]). TN informed pt. Updated orders to be entered by Dr De Los Santos once he is out of surgery.    Pt discharging to her home:    [address] [id number]  Cell [phone]

## 2019-08-30 NOTE — PROGRESS NOTES
Neuroendocrine Surgery  Progress Note    Hospital course: 43 y.o. woman transferred from OSH with abdominal pain and sepsis (GNR). 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  Hospital stay complicated by bilateral subsegmental pulmonary embolisms. Now s/p eddie-en-y gastric bypass takedown, jejunostomy take down and reanastomosis with reestablishment of gastrointestinal continuity on 8/21/2019. Patient had left pleural effusion and RUQ drained by IR on 8/28/2019.    S:  NAEON.  Febrile to 102.3 last night, isolated temperature. Afebrile remainder of day yesterday. Had large volume loosely formed BM last night   Persistently tachycardic, slightly improved  No nausea/vomiting  No chest pain/SOB    O:  Temp:  [98.3 °F (36.8 °C)-102.3 °F (39.1 °C)] 98.3 °F (36.8 °C)  Pulse:  [] 108  Resp:  [16-20] 16  SpO2:  [95 %-98 %] 96 %  BP: (124-127)/(68-85) 126/74    Physical Exam:  Gen: Alert and oriented x3. Appears mildly uncomfortable in bed  HEENT: normocephalic and atraumatic. EOMI.   Resp: Comfortable on room air  CV: Tachycardiac  Abd: Incision intact. Dressing on the left lower extremity clean dry and intact recently changed this AM, G-tube in place, clamped  Ext: warm and well perfused  MSK: normal range of motion, normal strength  Neuro: no focal deficits, normal sensation    I/O:  UOP: 4500 cc (2.1 cc/kg/hr)    CBC:   Recent Labs     08/28/19 0440 08/29/19  0401 08/30/19  0647   WBC 17.18* 15.83* 16.11*  16.11*   HGB 8.1* 7.5* 7.1*  7.1*   HCT 26.4* 24.2* 22.9*  22.9*   * 424* 454*  454*     CMP:  Recent Labs     08/28/19  0440 08/29/19  0401 08/30/19  0647    139 138  138   K 3.9 3.5 3.3*  3.3*    102 102  102   CO2 27 28 29  29    124* 150*  150*   BUN 10 17 9  9   CREATININE 0.5 0.5 0.5  0.5    CALCIUM 8.2* 7.9* 8.4*  8.4*   BILITOT 0.4 0.3 0.3  0.3   ALKPHOS 359* 319* 362*  362*   AST 25 22 19  19   ALT 44 36 30  30   ANIONGAP 9 9 7*  7*     Recent Labs     08/28/19  0440 08/29/19  0401 08/30/19  0647   MG 1.7 1.8 1.5*  1.5*   PHOS 4.5 4.4 4.1  4.1         A/P: 44 y.o. female admitted 8/8/2019 with history of eddie-en-Y gastric bypass 13 years ago, DM, & small bowel volvulus s/p ex lap with extensive small bowel resection and end jejunostomy approx 1 month PTA from ex-lap with esophagogastrostomy, jejuno-jejunostomy, appendectomy.     Clear liquid diet with G tube clamped   Vent G tube as needed for distention as needed   Continue scheduled Gabapentin and Flexeril and rectal tylenol. Discontinue dilaudid PCA today.   Continue azithromycin for pneumonia coverage   Continue TPN and Lipids 24 hours a day, severe protein-calorie malnutrition.    BID dressing changes to left abdomen previous ostomy site   Electrolyte replacement K to 4, PO4 to 3, Mg to 2   Blood, urine, fungal cultures cultures due to fever of 102.3    Myles De Los Santos MD   [employer] General Surgery, PGY-2  08/30/2019

## 2019-08-30 NOTE — PLAN OF CARE
Problem: Occupational Therapy Goal  Goal: Occupational Therapy Goal  Goals to be met by: 9/27     Patient will increase functional independence with ADLs by performing:    UE Dressing with [place]. Met 8/30  LE Dressing with Modified [place].  Met 8/30  Grooming while standing with [place]. Met 8/29  Toileting from toilet with [place] for hygiene and clothing management. Met 8/30  Toilet transfer to toilet with [place].  Met 8/29  Upper extremity exercise program x10 reps per handout, with independence.      Outcome: Ongoing (interventions implemented as appropriate)  Pt is able to perform ADLs and functional mobility at this time.    Will decrease POC to 1 x/week until DC.  Rec HH OT/PT

## 2019-08-30 NOTE — PROGRESS NOTES
Re: Home TPN Orders    For discharge home, change TPN to 240 gm dex, 120 gm Pro, 50 gm Fat-daily, 1440 mL x 14 hrs to provide 1800 calories, 120 gm Protein, 1.4 L fluids.  Lytes per pharmacy while in patient.     Laboratory:   Draw weekly: CMP-[Comprehensive; Na, K, Cl, CO2, glucose, BUN, Cr, Ca, Albumin, TProt, TBili, Alk Phos, AST, ALT, with Mg, PO4, and Prealbumin with CRP, Triglyceride 4-6 hrs after fat emulsion infusion        Infusion RD to make recommendations and changes once discharged to [facility].

## 2019-08-30 NOTE — PHYSICIAN QUERY
PT Name: Parvin Corbin  MR #: [id number]     PHYSICIAN QUERY -  ELECTROLYTE CLARIFICATION      CDS/: Lauren Moreno RN, CDS               Contact information: [email or web address]                                                                                                                         [phone]  This form is a permanent document in the medical record.     Query Date: 2019    By submitting this query, we are merely seeking further clarification of documentation to reflect the severity of illness of your patient. Please utilize your independent clinical judgment when addressing the question(s) below.    The Medical record reflects the following:     Indicators   Supporting Clinical Findings Location in Medical Record   x Lab Value(s) K: 3.4 --> 3.4 --> 3.9 --> 3.3 --> 3.3     Ma.5 --> 1.7 --> 1.5 --> 1.5    Phos: 2.4 --> 2.6 --> 2.4    Labs 8/10-     Labs -     Labs -    x Treatment                       Medication potassium chloride 10 mEq in 100 mL IVPB   potassium chloride 20 mEq in 100 mL IVPB (FOR CENTRAL LINE ADMINISTRATION ONLY)     magnesium sulfate 2g in water 50mL IVPB (premix)      sodium phosphate 30 mmol in dextrose 5 % 250 mL IVPB  MAR 8/10, - 8/26  MAR 8/27, 8/29      MAR 8/25 -     MAR ,       Other       Provider, please specify the diagnosis or diagnoses that correspond(s) to the above indicators. Dionisio all that apply:    [  x ] Hypokalemia   [ x  ] Hypomagnesemia   [ x  ] Hypophosphatemia   [   ] Other electrolyte disturbance (please specify): _______   [   ] Clinically Undetermined       Please document in your progress notes daily for the duration of treatment until resolved, and include in your discharge summary.

## 2019-08-30 NOTE — PT/OT/SLP PROGRESS
"Occupational Therapy   Treatment    Name: Parvin Corbin  MRN: [id number]  Admitting Diagnosis:  Sepsis  9 Days Post-Op    Recommendations:     Discharge Recommendations: home health OT, home health PT  Discharge Equipment Recommendations:  shower chair  Barriers to discharge:  None    Assessment:     Parvin Corbin is a 44 y.o. female with a medical diagnosis of Sepsis.  She presents with performance deficits affecting function are pain, decreased ROM, impaired skin.     Rehab Prognosis:  Good; patient would benefit from acute skilled OT services to address these deficits and reach maximum level of function.       Plan:     Patient to be seen 1 x/week to address the above listed problems via therapeutic exercises, therapeutic activities, self-care/home management  · Plan of Care Expires: 09/27/19  · Plan of Care Reviewed with: patient, spouse    Subjective     Pain/Comfort:  · Pain Rating 1: 4/10  · Location - Orientation 1: generalized  · Location 1: abdomen  · Pain Addressed 1: Pre-medicate for activity, Cessation of Activity, Reposition, Distraction  · Pain Rating Post-Intervention 1: 4/10    Objective:     Communicated with: nurse prior to session.  Patient found up in chair with PICC line upon OT entry to room.    General Precautions: Standard, fall   Orthopedic Precautions:    Braces:       Occupational Performance:   ·     Activities of Daily Living:  · Mod I ADLs      [facility] 6 Click ADL: 24    Treatment & Education:  Pt demonstrated ability to complete ADLs mod I.  Educated on use of phone alarm to set reminder to take rest breaks, benefit of therapeutic rest and establishing "visitng hours" at home to limit constant 'comings and hayden' of visitors throughout the day to allow for down time and healing.    Patient left up in chair with all lines intact, call button in reach and family presentEducation:      GOALS:   Multidisciplinary Problems     Occupational Therapy Goals        Problem: Occupational Therapy Goal "    Goal Priority Disciplines Outcome Interventions   Occupational Therapy Goal     OT, PT/OT Ongoing (interventions implemented as appropriate)    Description:  Goals to be met by: 9/27     Patient will increase functional independence with ADLs by performing:    UE Dressing with [place]. Met 8/30  LE Dressing with Modified [place].  Met 8/30  Grooming while standing with [place]. Met 8/29  Toileting from toilet with [place] for hygiene and clothing management. Met 8/30  Toilet transfer to toilet with [place].  Met 8/29  Upper extremity exercise program x10 reps per handout, with independence.                        Time Tracking:     OT Date of Treatment: 08/30/19  OT Start Time: 1323  OT Stop Time: 1340  OT Total Time (min): 17 min    Billable Minutes:Therapeutic Activity 17    Sánchez Smith OT  8/30/2019

## 2019-08-30 NOTE — PLAN OF CARE
Problem: Adult Inpatient Plan of Care  Goal: Plan of Care Review  Outcome: Ongoing (interventions implemented as appropriate)     08/30/19 5641   Plan of Care Review   Plan of Care Reviewed With patient;spouse   Mrs. Corbin is resting in bed and  is at bedside. Medications were given per orders in MAR. PRN medications were given for pain and temperature. No complaints of N/V. Dressing remained dry and intact; due to be changed this morning. Blood glucose monitoring, with sliding scale orders in place. PICC line dressing change due to be changed this morning. Safety maintained, remained free from falls, and instructed to call about concerns, needs, or assistance.

## 2019-08-31 LAB
ABO + RH BLD: NORMAL
ALBUMIN SERPL BCP-MCNC: 2.1 G/DL (ref 3.5–5.2)
ALP SERPL-CCNC: 278 U/L (ref 55–135)
ALT SERPL W/O P-5'-P-CCNC: 27 U/L (ref 10–44)
ANION GAP SERPL CALC-SCNC: 10 MMOL/L (ref 8–16)
ANISOCYTOSIS BLD QL SMEAR: SLIGHT
AST SERPL-CCNC: 22 U/L (ref 10–40)
BACTERIA SPEC AEROBE CULT: NO GROWTH
BACTERIA SPEC AEROBE CULT: NO GROWTH
BASOPHILS # BLD AUTO: ABNORMAL K/UL (ref 0–0.2)
BASOPHILS NFR BLD: 0 % (ref 0–1.9)
BILIRUB SERPL-MCNC: 0.6 MG/DL (ref 0.1–1)
BLD GP AB SCN CELLS X3 SERPL QL: NORMAL
BLD PROD TYP BPU: NORMAL
BLOOD UNIT EXPIRATION DATE: NORMAL
BLOOD UNIT TYPE CODE: 9500
BLOOD UNIT TYPE: NORMAL
BUN SERPL-MCNC: 10 MG/DL (ref 6–20)
C DIFF GDH STL QL: NEGATIVE
C DIFF TOX A+B STL QL IA: NEGATIVE
CALCIUM SERPL-MCNC: 8.3 MG/DL (ref 8.7–10.5)
CHLORIDE SERPL-SCNC: 105 MMOL/L (ref 95–110)
CO2 SERPL-SCNC: 22 MMOL/L (ref 23–29)
CODING SYSTEM: NORMAL
CREAT SERPL-MCNC: 0.5 MG/DL (ref 0.5–1.4)
DIFFERENTIAL METHOD: ABNORMAL
DISPENSE STATUS: NORMAL
EOSINOPHIL # BLD AUTO: ABNORMAL K/UL (ref 0–0.5)
EOSINOPHIL NFR BLD: 1 % (ref 0–8)
ERYTHROCYTE [DISTWIDTH] IN BLOOD BY AUTOMATED COUNT: 17.1 % (ref 11.5–14.5)
EST. GFR  (AFRICAN AMERICAN): >60 ML/MIN/1.73 M^2
EST. GFR  (NON AFRICAN AMERICAN): >60 ML/MIN/1.73 M^2
GLUCOSE SERPL-MCNC: 140 MG/DL (ref 70–110)
HCT VFR BLD AUTO: 28 % (ref 37–48.5)
HGB BLD-MCNC: 8.6 G/DL (ref 12–16)
HYPOCHROMIA BLD QL SMEAR: ABNORMAL
LYMPHOCYTES # BLD AUTO: ABNORMAL K/UL (ref 1–4.8)
LYMPHOCYTES NFR BLD: 5 % (ref 18–48)
MAGNESIUM SERPL-MCNC: 1.4 MG/DL (ref 1.6–2.6)
MCH RBC QN AUTO: 25.5 PG (ref 27–31)
MCHC RBC AUTO-ENTMCNC: 30.7 G/DL (ref 32–36)
MCV RBC AUTO: 83 FL (ref 82–98)
MONOCYTES # BLD AUTO: ABNORMAL K/UL (ref 0.3–1)
MONOCYTES NFR BLD: 4 % (ref 4–15)
NEUTROPHILS NFR BLD: 87 % (ref 38–73)
NEUTS BAND NFR BLD MANUAL: 3 %
PHOSPHATE SERPL-MCNC: 3.3 MG/DL (ref 2.7–4.5)
PLATELET # BLD AUTO: 342 K/UL (ref 150–350)
PLATELET BLD QL SMEAR: ABNORMAL
PMV BLD AUTO: 10.4 FL (ref 9.2–12.9)
POCT GLUCOSE: 131 MG/DL (ref 70–110)
POCT GLUCOSE: 157 MG/DL (ref 70–110)
POCT GLUCOSE: 174 MG/DL (ref 70–110)
POCT GLUCOSE: 91 MG/DL (ref 70–110)
POLYCHROMASIA BLD QL SMEAR: ABNORMAL
POTASSIUM SERPL-SCNC: 3.5 MMOL/L (ref 3.5–5.1)
PROT SERPL-MCNC: 5.9 G/DL (ref 6–8.4)
RBC # BLD AUTO: 3.37 M/UL (ref 4–5.4)
SODIUM SERPL-SCNC: 137 MMOL/L (ref 136–145)
TRANS ERYTHROCYTES VOL PATIENT: NORMAL ML
WBC # BLD AUTO: 24.36 K/UL (ref 3.9–12.7)

## 2019-08-31 PROCEDURE — 25000003 PHARM REV CODE 250: Performed by: STUDENT IN AN ORGANIZED HEALTH CARE EDUCATION/TRAINING PROGRAM

## 2019-08-31 PROCEDURE — 83735 ASSAY OF MAGNESIUM: CPT

## 2019-08-31 PROCEDURE — 21400001 HC TELEMETRY ROOM

## 2019-08-31 PROCEDURE — A4217 STERILE WATER/SALINE, 500 ML: HCPCS | Performed by: STUDENT IN AN ORGANIZED HEALTH CARE EDUCATION/TRAINING PROGRAM

## 2019-08-31 PROCEDURE — 25000003 PHARM REV CODE 250: Performed by: SURGERY

## 2019-08-31 PROCEDURE — 80053 COMPREHEN METABOLIC PANEL: CPT

## 2019-08-31 PROCEDURE — 85027 COMPLETE CBC AUTOMATED: CPT

## 2019-08-31 PROCEDURE — S0171 BUMETANIDE 0.5 MG: HCPCS | Performed by: SURGERY

## 2019-08-31 PROCEDURE — 63600175 PHARM REV CODE 636 W HCPCS: Performed by: SURGERY

## 2019-08-31 PROCEDURE — 85007 BL SMEAR W/DIFF WBC COUNT: CPT

## 2019-08-31 PROCEDURE — P9021 RED BLOOD CELLS UNIT: HCPCS

## 2019-08-31 PROCEDURE — 87449 NOS EACH ORGANISM AG IA: CPT

## 2019-08-31 PROCEDURE — 63600175 PHARM REV CODE 636 W HCPCS: Performed by: STUDENT IN AN ORGANIZED HEALTH CARE EDUCATION/TRAINING PROGRAM

## 2019-08-31 PROCEDURE — 94761 N-INVAS EAR/PLS OXIMETRY MLT: CPT

## 2019-08-31 PROCEDURE — 99900035 HC TECH TIME PER 15 MIN (STAT)

## 2019-08-31 PROCEDURE — 84100 ASSAY OF PHOSPHORUS: CPT

## 2019-08-31 PROCEDURE — 25000242 PHARM REV CODE 250 ALT 637 W/ HCPCS: Performed by: SURGERY

## 2019-08-31 RX ORDER — ACETAMINOPHEN 650 MG/20.3ML
650 LIQUID ORAL EVERY 6 HOURS PRN
Status: DISCONTINUED | OUTPATIENT
Start: 2019-08-31 | End: 2019-09-01 | Stop reason: HOSPADM

## 2019-08-31 RX ORDER — POTASSIUM CHLORIDE 7.45 MG/ML
10 INJECTION INTRAVENOUS
Status: COMPLETED | OUTPATIENT
Start: 2019-08-31 | End: 2019-08-31

## 2019-08-31 RX ORDER — POTASSIUM CHLORIDE 14.9 MG/ML
40 INJECTION INTRAVENOUS ONCE
Status: DISCONTINUED | OUTPATIENT
Start: 2019-08-31 | End: 2019-08-31

## 2019-08-31 RX ORDER — MAGNESIUM SULFATE HEPTAHYDRATE 40 MG/ML
2 INJECTION, SOLUTION INTRAVENOUS ONCE
Status: COMPLETED | OUTPATIENT
Start: 2019-08-31 | End: 2019-08-31

## 2019-08-31 RX ADMIN — PREGABALIN 75 MG: 75 CAPSULE ORAL at 08:08

## 2019-08-31 RX ADMIN — PANTOPRAZOLE SODIUM 40 MG: 40 TABLET, DELAYED RELEASE ORAL at 08:08

## 2019-08-31 RX ADMIN — MICONAZOLE NITRATE: 20 POWDER TOPICAL at 09:08

## 2019-08-31 RX ADMIN — SPIRONOLACTONE 25 MG: 25 TABLET, FILM COATED ORAL at 08:08

## 2019-08-31 RX ADMIN — CYCLOBENZAPRINE HYDROCHLORIDE 5 MG: 5 TABLET, FILM COATED ORAL at 08:08

## 2019-08-31 RX ADMIN — POTASSIUM CHLORIDE 10 MEQ: 7.46 INJECTION, SOLUTION INTRAVENOUS at 10:08

## 2019-08-31 RX ADMIN — CYANOCOBALAMIN 1000 MCG: 1000 INJECTION, SOLUTION INTRAMUSCULAR; SUBCUTANEOUS at 08:08

## 2019-08-31 RX ADMIN — MAGNESIUM SULFATE IN WATER 2 G: 40 INJECTION, SOLUTION INTRAVENOUS at 10:08

## 2019-08-31 RX ADMIN — METOCLOPRAMIDE HYDROCHLORIDE 10 MG: 10 TABLET ORAL at 05:08

## 2019-08-31 RX ADMIN — IRON SUCROSE 100 MG: 20 INJECTION, SOLUTION INTRAVENOUS at 08:08

## 2019-08-31 RX ADMIN — Medication 125 MG: at 12:08

## 2019-08-31 RX ADMIN — Medication 125 MG: at 11:08

## 2019-08-31 RX ADMIN — POTASSIUM CHLORIDE 10 MEQ: 7.46 INJECTION, SOLUTION INTRAVENOUS at 12:08

## 2019-08-31 RX ADMIN — CYCLOBENZAPRINE HYDROCHLORIDE 5 MG: 5 TABLET, FILM COATED ORAL at 09:08

## 2019-08-31 RX ADMIN — POTASSIUM CHLORIDE 10 MEQ: 7.46 INJECTION, SOLUTION INTRAVENOUS at 11:08

## 2019-08-31 RX ADMIN — ENOXAPARIN SODIUM 40 MG: 100 INJECTION SUBCUTANEOUS at 03:08

## 2019-08-31 RX ADMIN — OXYCODONE AND ACETAMINOPHEN 1 TABLET: 5; 325 TABLET ORAL at 11:08

## 2019-08-31 RX ADMIN — BUMETANIDE 1 MG: 0.25 INJECTION INTRAMUSCULAR; INTRAVENOUS at 08:08

## 2019-08-31 RX ADMIN — CALCIUM GLUCONATE: 94 INJECTION, SOLUTION INTRAVENOUS at 06:08

## 2019-08-31 RX ADMIN — Medication 125 MG: at 05:08

## 2019-08-31 RX ADMIN — SPIRONOLACTONE 25 MG: 25 TABLET, FILM COATED ORAL at 09:08

## 2019-08-31 RX ADMIN — CYCLOBENZAPRINE HYDROCHLORIDE 5 MG: 5 TABLET, FILM COATED ORAL at 03:08

## 2019-08-31 RX ADMIN — MICONAZOLE NITRATE: 20 POWDER TOPICAL at 08:08

## 2019-08-31 RX ADMIN — POTASSIUM CHLORIDE 10 MEQ: 7.46 INJECTION, SOLUTION INTRAVENOUS at 01:08

## 2019-08-31 RX ADMIN — SOMATROPIN 10 MG: KIT at 10:08

## 2019-08-31 RX ADMIN — OXYCODONE AND ACETAMINOPHEN 1 TABLET: 5; 325 TABLET ORAL at 04:08

## 2019-08-31 NOTE — PLAN OF CARE
Problem: Adult Inpatient Plan of Care  Goal: Plan of Care Review  Outcome: Ongoing (interventions implemented as appropriate)  Pt AAOx4. Pt with minimal complaints of abdominal pain. Pt tolerating clear liquid diet with no complaints of nausea. Pt receiving 1 unit RBCs this shift. G tube clamped, residuals checked and flushed Q6H. TPN infusing at 90 ml/hr, rate changed per MD orders. Abdominal dressing remains CDI. Glucose and cardiac monitoring continued. Pt ambulating to toilet with stand-by assist. Bed locked in lowest position, bed alarm set and call bell within reach. Will continue to monitor.

## 2019-08-31 NOTE — PLAN OF CARE
Problem: Adult Inpatient Plan of Care  Goal: Plan of Care Review  Outcome: Ongoing (interventions implemented as appropriate)  Pt AAOx4, VSS, NAD. No complaints of N/V or headache. Complains of pain, PRN pain medications given. TPN infusing per MAR. Blood glucose monitored. Tolerating diet. Bed alarm on. Call light in reach. No complaints at this time. Safety maintained. Will continue to monitor.

## 2019-08-31 NOTE — PLAN OF CARE
Problem: Adult Inpatient Plan of Care  Goal: Plan of Care Review  Outcome: Revised  Patient is awake and alert.  Patient has been complaining of having loose stools.  Stool specimen sent to Lab.  Placed on Special Contact Isolation.  Patient ambulated in hallway with .  Has complaints of pain and pain medications given with relief.  Safety is maintained with bed low, wheels locked and side rails up. Call light within reach.  Will continue to monitor.

## 2019-08-31 NOTE — PROGRESS NOTES
Neuroendocrine Surgery  Progress Note    Hospital course: 43 y.o. woman transferred from OSH with abdominal pain and sepsis (GNR). 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  Hospital stay complicated by bilateral subsegmental pulmonary embolisms. Now s/p eddie-en-y gastric bypass takedown, jejunostomy take down and reanastomosis with reestablishment of gastrointestinal continuity on 8/21/2019. Patient had left pleural effusion and RUQ drained by IR on 8/28/2019.    S:  NAEON.  Afebrile over last 24 hours, multiple liquid bowel movements overnight.  Persistently tachycardic  No nausea/vomiting  No chest pain/SOB    O:  Temp:  [98.1 °F (36.7 °C)-99.2 °F (37.3 °C)] 98.1 °F (36.7 °C)  Pulse:  [110-130] 116  Resp:  [16-19] 16  SpO2:  [96 %] 96 %  BP: (121-140)/(71-87) 140/81    Physical Exam:  Gen: Alert and oriented x3. Appears mildly uncomfortable in bed  HEENT: normocephalic and atraumatic. EOMI.   Resp: Comfortable on room air  CV: Tachycardiac  Abd: Incision intact. Dressing on the left lower extremity clean dry and intact recently changed this AM, G-tube in place, clamped  Ext: warm and well perfused  MSK: normal range of motion, normal strength  Neuro: no focal deficits, normal sensation    I/O:  UOP: 4500 cc (2.1 cc/kg/hr)    CBC:   Recent Labs     08/29/19  0401 08/30/19  0647   WBC 15.83* 16.11*  16.11*   HGB 7.5* 7.1*  7.1*   HCT 24.2* 22.9*  22.9*   * 454*  454*     CMP:  Recent Labs     08/29/19  0401 08/30/19  0647    138  138   K 3.5 3.3*  3.3*    102  102   CO2 28 29  29   * 150*  150*   BUN 17 9  9   CREATININE 0.5 0.5  0.5   CALCIUM 7.9* 8.4*  8.4*   BILITOT 0.3 0.3  0.3   ALKPHOS 319* 362*  362*   AST 22 19  19   ALT 36 30  30   ANIONGAP 9 7*  7*     Recent Labs     08/29/19  0409  08/30/19  0647   MG 1.8 1.5*  1.5*   PHOS 4.4 4.1  4.1         A/P: 44 y.o. female admitted 8/8/2019 with history of eddie-en-Y gastric bypass 13 years ago, DM, & small bowel volvulus s/p ex lap with extensive small bowel resection and end jejunostomy approx 1 month PTA from ex-lap with esophagogastrostomy, jejuno-jejunostomy, appendectomy.     Clear liquid diet with G tube clamped   Vent G tube as needed for distention as needed   Continue scheduled pregabalin and Flexeril and PRN control   Continue TPN and Lipids overnight, severe protein-calorie malnutrition.    BID dressing changes to left abdomen previous ostomy site   Electrolyte replacement K to 4, PO4 to 3, Mg to 2    Possible discharge today    Myles De Los Santos MD   [employer] General Surgery, PGY-2  08/31/2019

## 2019-09-01 VITALS
DIASTOLIC BLOOD PRESSURE: 72 MMHG | HEIGHT: 65 IN | WEIGHT: 189.13 LBS | OXYGEN SATURATION: 96 % | BODY MASS INDEX: 31.51 KG/M2 | HEART RATE: 121 BPM | TEMPERATURE: 98 F | RESPIRATION RATE: 16 BRPM | SYSTOLIC BLOOD PRESSURE: 132 MMHG

## 2019-09-01 PROBLEM — A41.9 SEPSIS: Status: RESOLVED | Noted: 2019-08-08 | Resolved: 2019-09-01

## 2019-09-01 LAB
ALBUMIN SERPL BCP-MCNC: 2 G/DL (ref 3.5–5.2)
ALP SERPL-CCNC: 225 U/L (ref 55–135)
ALT SERPL W/O P-5'-P-CCNC: 22 U/L (ref 10–44)
ANION GAP SERPL CALC-SCNC: 11 MMOL/L (ref 8–16)
AST SERPL-CCNC: 14 U/L (ref 10–40)
BASOPHILS # BLD AUTO: 0.04 K/UL (ref 0–0.2)
BASOPHILS NFR BLD: 0.3 % (ref 0–1.9)
BILIRUB SERPL-MCNC: 0.3 MG/DL (ref 0.1–1)
BUN SERPL-MCNC: 9 MG/DL (ref 6–20)
CALCIUM SERPL-MCNC: 8.7 MG/DL (ref 8.7–10.5)
CHLORIDE SERPL-SCNC: 104 MMOL/L (ref 95–110)
CO2 SERPL-SCNC: 22 MMOL/L (ref 23–29)
CREAT SERPL-MCNC: 0.5 MG/DL (ref 0.5–1.4)
DIFFERENTIAL METHOD: ABNORMAL
EOSINOPHIL # BLD AUTO: 0.4 K/UL (ref 0–0.5)
EOSINOPHIL NFR BLD: 2.9 % (ref 0–8)
ERYTHROCYTE [DISTWIDTH] IN BLOOD BY AUTOMATED COUNT: 17.2 % (ref 11.5–14.5)
EST. GFR  (AFRICAN AMERICAN): >60 ML/MIN/1.73 M^2
EST. GFR  (NON AFRICAN AMERICAN): >60 ML/MIN/1.73 M^2
GLUCOSE SERPL-MCNC: 155 MG/DL (ref 70–110)
HCT VFR BLD AUTO: 26.7 % (ref 37–48.5)
HGB BLD-MCNC: 8.3 G/DL (ref 12–16)
LYMPHOCYTES # BLD AUTO: 1.6 K/UL (ref 1–4.8)
LYMPHOCYTES NFR BLD: 10.7 % (ref 18–48)
MAGNESIUM SERPL-MCNC: 1.5 MG/DL (ref 1.6–2.6)
MCH RBC QN AUTO: 26 PG (ref 27–31)
MCHC RBC AUTO-ENTMCNC: 31.1 G/DL (ref 32–36)
MCV RBC AUTO: 84 FL (ref 82–98)
MONOCYTES # BLD AUTO: 1.3 K/UL (ref 0.3–1)
MONOCYTES NFR BLD: 8.7 % (ref 4–15)
NEUTROPHILS # BLD AUTO: 11.5 K/UL (ref 1.8–7.7)
NEUTROPHILS NFR BLD: 77.4 % (ref 38–73)
PHOSPHATE SERPL-MCNC: 4.2 MG/DL (ref 2.7–4.5)
PLATELET # BLD AUTO: 536 K/UL (ref 150–350)
PMV BLD AUTO: 9.4 FL (ref 9.2–12.9)
POCT GLUCOSE: 149 MG/DL (ref 70–110)
POCT GLUCOSE: 98 MG/DL (ref 70–110)
POTASSIUM SERPL-SCNC: 3.9 MMOL/L (ref 3.5–5.1)
PROT SERPL-MCNC: 6.2 G/DL (ref 6–8.4)
RBC # BLD AUTO: 3.19 M/UL (ref 4–5.4)
SODIUM SERPL-SCNC: 137 MMOL/L (ref 136–145)
WBC # BLD AUTO: 15.14 K/UL (ref 3.9–12.7)

## 2019-09-01 PROCEDURE — 83735 ASSAY OF MAGNESIUM: CPT

## 2019-09-01 PROCEDURE — 25000003 PHARM REV CODE 250: Performed by: STUDENT IN AN ORGANIZED HEALTH CARE EDUCATION/TRAINING PROGRAM

## 2019-09-01 PROCEDURE — 84100 ASSAY OF PHOSPHORUS: CPT

## 2019-09-01 PROCEDURE — 80053 COMPREHEN METABOLIC PANEL: CPT

## 2019-09-01 PROCEDURE — 85025 COMPLETE CBC W/AUTO DIFF WBC: CPT

## 2019-09-01 RX ORDER — METRONIDAZOLE 500 MG/1
500 TABLET ORAL EVERY 8 HOURS
Qty: 42 TABLET | Refills: 0 | Status: SHIPPED | OUTPATIENT
Start: 2019-09-01 | End: 2019-09-15

## 2019-09-01 RX ORDER — OXYCODONE AND ACETAMINOPHEN 5; 325 MG/1; MG/1
1 TABLET ORAL EVERY 4 HOURS PRN
Qty: 20 TABLET | Refills: 0 | Status: SHIPPED | OUTPATIENT
Start: 2019-09-01 | End: 2019-09-03 | Stop reason: SDUPTHER

## 2019-09-01 RX ADMIN — Medication 125 MG: at 06:09

## 2019-09-01 RX ADMIN — OXYCODONE AND ACETAMINOPHEN 1 TABLET: 5; 325 TABLET ORAL at 06:09

## 2019-09-01 RX ADMIN — OXYCODONE AND ACETAMINOPHEN 1 TABLET: 5; 325 TABLET ORAL at 12:09

## 2019-09-01 NOTE — PLAN OF CARE
TN contacted [facility]  [phone]. She contacted the [facility] office to verify that they will be delivering TPN to pt. TN forwarded ourde via  [employer] fax to the [facility] office.    TN contacted [facility] [phone], fax [phone]. TN confirmed fax number with on call Emelyn. Pt will be a resumption of care with them. They will see patient on Monday 9/2/19    No DME ordered    Pt's nurse will go over medications/signs and symptoms prior to discharge       09/01/19 1447   Final Note   Assessment Type Final Discharge Note   Anticipated Discharge Disposition Home-Health  ([facility])   What phone number can be called within the next 1-3 days to see how you are doing after discharge? [phone]   Hospital Follow Up  Appt(s) scheduled? No  (Offices closed for Weekend. Patient to schedule own follow up appointment. )   Right Care Referral Info   Post Acute Recommendation Home-care   [facility]     Dorys Ahuja, RN Transitional Navigator  [phone]

## 2019-09-01 NOTE — PROGRESS NOTES
TN contacted [email or web address] , awaiting return call from their weekend on call regarding TPN/IV ABX    TN contacted [facility] [phone], fax [phone]. TN confirmed fax number with on call Emelyn. Pt will be a resumption of care with them. They will see patient on Monday 9/2/19

## 2019-09-01 NOTE — NURSING
Progress notes reviewed. VN unable to cue into room 2/2 monitor being offline. Floor nurse notified. VN will continue to follow.

## 2019-09-01 NOTE — PLAN OF CARE
Problem: Adult Inpatient Plan of Care  Goal: Plan of Care Review  Outcome: Ongoing (interventions implemented as appropriate)  Pt AAOx3. Family at bedside. Contact precautions discontinued. Medications administered per order. TPN infusing. Diet progressed. Pt slept through the night. Ambulates with assistance to bedside commode. Encouraged to call with questions and concerns. Will continue to monitor. Safety maintained.

## 2019-09-02 LAB
BACTERIA BLD CULT: NORMAL
BACTERIA BLD CULT: NORMAL

## 2019-09-03 LAB — BACTERIA SPEC ANAEROBE CULT: NORMAL

## 2019-09-03 RX ORDER — OXYCODONE AND ACETAMINOPHEN 5; 325 MG/1; MG/1
1 TABLET ORAL EVERY 4 HOURS PRN
Qty: 45 TABLET | Refills: 0 | Status: SHIPPED | OUTPATIENT
Start: 2019-09-03 | End: 2019-09-09 | Stop reason: SDUPTHER

## 2019-09-03 RX ORDER — OXYCODONE AND ACETAMINOPHEN 5; 325 MG/1; MG/1
1 TABLET ORAL EVERY 4 HOURS PRN
Qty: 20 TABLET | Refills: 0 | Status: SHIPPED | OUTPATIENT
Start: 2019-09-03 | End: 2019-09-03 | Stop reason: SDUPTHER

## 2019-09-04 LAB
BACTERIA BLD CULT: NORMAL
BACTERIA SPEC ANAEROBE CULT: NORMAL

## 2019-09-05 ENCOUNTER — TELEPHONE (OUTPATIENT)
Dept: NEUROLOGY | Facility: HOSPITAL | Age: 44
End: 2019-09-05

## 2019-09-05 LAB
EXT 24 HR UR METANEPHRINE: ABNORMAL
EXT 24 HR UR NORMETANEPHRINE: ABNORMAL
EXT 24 HR UR NORMETANEPHRINE: ABNORMAL
EXT 25 HYDROXY VIT D2: ABNORMAL
EXT 25 HYDROXY VIT D3: ABNORMAL
EXT 5 HIAA 24 HR URINE: ABNORMAL
EXT 5 HIAA BLOOD: ABNORMAL
EXT ACTH: ABNORMAL
EXT AFP: ABNORMAL
EXT ALBUMIN: ABNORMAL
EXT ALKALINE PHOSPHATASE: ABNORMAL
EXT ALT: ABNORMAL
EXT AMYLASE: ABNORMAL
EXT ANTI ISLET CELL AB: ABNORMAL
EXT ANTI PARIETAL CELL AB: ABNORMAL
EXT ANTI THYROID AB: ABNORMAL
EXT AST: ABNORMAL
EXT BILIRUBIN DIRECT: ABNORMAL MG/DL
EXT BILIRUBIN TOTAL: ABNORMAL
EXT BK VIRUS DNA QN PCR: ABNORMAL
EXT BUN: 14.9 MG/DL (ref 6–19)
EXT C PEPTIDE: ABNORMAL
EXT CA 125: ABNORMAL
EXT CA 19-9: ABNORMAL
EXT CA 27-29: ABNORMAL
EXT CALCITONIN: ABNORMAL
EXT CALCIUM: ABNORMAL
EXT CEA: ABNORMAL
EXT CHLORIDE: 95 MMOL/L (ref 98–107)
EXT CHOLESTEROL: ABNORMAL
EXT CHROMOGRANIN A: ABNORMAL
EXT CO2: 23 MMOL/L (ref 23–29)
EXT CREATININE UA: ABNORMAL
EXT CREATININE: 0.38 MG/DL (ref 0.7–1.6)
EXT CYCLOSPORONE LEVEL: ABNORMAL
EXT DOPAMINE: ABNORMAL
EXT EBV DNA BY PCR: ABNORMAL
EXT EPINEPHRINE: ABNORMAL
EXT FOLATE: ABNORMAL
EXT FREE T3: ABNORMAL
EXT FREE T4: ABNORMAL
EXT FSH: ABNORMAL
EXT GASTRIN RELEASING PEPTIDE: ABNORMAL
EXT GASTRIN RELEASING PEPTIDE: ABNORMAL
EXT GASTRIN: ABNORMAL
EXT GGT: ABNORMAL
EXT GHRELIN: ABNORMAL
EXT GLUCAGON: ABNORMAL
EXT GLUCOSE: 108 MG/DL (ref 70–110)
EXT GROWTH HORMONE: ABNORMAL
EXT HCV RNA QUANT PCR: ABNORMAL
EXT HDL: ABNORMAL
EXT HEMATOCRIT: 29.4 G/DL (ref 35–45.2)
EXT HEMOGLOBIN A1C: ABNORMAL %
EXT HEMOGLOBIN: 9 G/DL (ref 11.9–15.3)
EXT HISTAMINE 24 HR URINE: ABNORMAL
EXT HISTAMINE: ABNORMAL
EXT IGF-1: ABNORMAL
EXT IMMUNKNOW (NON-STIMULATED): ABNORMAL
EXT IMMUNKNOW (STIMULATED): ABNORMAL
EXT INR: ABNORMAL
EXT INSULIN: ABNORMAL
EXT LANREOTIDE LEVEL: ABNORMAL
EXT LDH, TOTAL: ABNORMAL
EXT LDL CHOLESTEROL: ABNORMAL
EXT LIPASE: ABNORMAL
EXT MAGNESIUM: ABNORMAL
EXT METANEPHRINE FREE PLASMA: ABNORMAL
EXT MOTILIN: ABNORMAL
EXT NEUROKININ A CAMB: ABNORMAL
EXT NEUROKININ A ISI: ABNORMAL
EXT NEUROTENSIN: ABNORMAL
EXT NOREPINEPHRINE: ABNORMAL
EXT NORMETANEPHRINE: ABNORMAL
EXT NSE: ABNORMAL
EXT OCTREOTIDE LEVEL: ABNORMAL
EXT PANCREASTATIN CAMB: ABNORMAL
EXT PANCREASTATIN ISI: ABNORMAL
EXT PANCREATIC POLYPEPTIDE: ABNORMAL
EXT PHOSPHORUS: ABNORMAL
EXT PLATELETS: 842 10*3/UL (ref 160–400)
EXT POTASSIUM: 4.6 MMOL/L (ref 3.5–5.1)
EXT PROGRAF LEVEL: ABNORMAL
EXT PROLACTIN: ABNORMAL
EXT PROTEIN TOTAL: ABNORMAL
EXT PROTEIN UA: ABNORMAL
EXT PT: ABNORMAL
EXT PTH, INTACT: ABNORMAL
EXT PTT: ABNORMAL
EXT RAPAMUNE LEVEL: ABNORMAL
EXT SEROTONIN: ABNORMAL
EXT SODIUM: 134 MMOL/L (ref 136–145)
EXT SOMATOSTATIN: ABNORMAL
EXT SUBSTANCE P: ABNORMAL
EXT TRIGLYCERIDES: ABNORMAL
EXT TRYPTASE: ABNORMAL
EXT TSH: ABNORMAL
EXT URIC ACID: ABNORMAL
EXT URINE AMYLASE U/HR: ABNORMAL
EXT URINE AMYLASE U/L: ABNORMAL
EXT VASOACTIVE INTESTINAL POLYPEPTIDE: ABNORMAL
EXT VITAMIN B12: ABNORMAL
EXT VMA 24 HR URINE: ABNORMAL
EXT WBC: 13.2 10*3/UL (ref 4.8–10.8)
NEURON SPECIFIC ENOLASE: ABNORMAL

## 2019-09-05 NOTE — TELEPHONE ENCOUNTER
Spoke to the patient about the recommendations and explained that Dr. Beth will be following up. Patient verbalized understanding.

## 2019-09-05 NOTE — TELEPHONE ENCOUNTER
----- Message from Trish Kathleen sent at 9/3/2019  3:39 PM CDT -----  Contact: Patient/ [phone]  Patient is requesting a callback in regards to Jhonatan the Rep for the medication stating that she need to schedule a colonoscopy prior to receive the medication Gattex.    Please call.

## 2019-09-06 ENCOUNTER — PATIENT MESSAGE (OUTPATIENT)
Dept: NEUROLOGY | Facility: HOSPITAL | Age: 44
End: 2019-09-06

## 2019-09-06 DIAGNOSIS — R10.84 GENERALIZED ABDOMINAL PAIN: ICD-10-CM

## 2019-09-06 DIAGNOSIS — K91.89 COMPLICATIONS OF GASTRIC BYPASS SURGERY: Primary | ICD-10-CM
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
## 2019-09-06 RX ORDER — FUROSEMIDE 20 MG/1
20 TABLET ORAL 2 TIMES DAILY
Qty: 14 TABLET | Refills: 1 | Status: SHIPPED | OUTPATIENT
Start: 2019-09-06 | End: 2019-09-13

## 2019-09-06 RX ORDER — SPIRONOLACTONE 25 MG/1
25 TABLET ORAL 2 TIMES DAILY
Qty: 14 TABLET | Refills: 1 | Status: SHIPPED | OUTPATIENT
Start: 2019-09-06 | End: 2019-09-13

## 2019-09-06 RX ORDER — SPIRONOLACTONE 50 MG/1
50 TABLET, FILM COATED ORAL DAILY
Qty: 20 TABLET | Refills: 1 | Status: SHIPPED | OUTPATIENT
Start: 2019-09-06 | End: 2019-11-11

## 2019-09-06 RX ORDER — FUROSEMIDE 40 MG/1
40 TABLET ORAL 2 TIMES DAILY
Qty: 20 TABLET | Refills: 1 | Status: SHIPPED | OUTPATIENT
Start: 2019-09-06 | End: 2019-11-11

## 2019-09-06 NOTE — TELEPHONE ENCOUNTER
----- Message from Trish Kathleen sent at 9/5/2019  4:23 PM CDT -----  Contact: Patient/ [phone]  Patient is calling in regards to informing the doctor that she took a chest X-ray and it came back abnormal and she wanted the doctor to know.  Pt stated that she faxed over the results to the office.    Pt stated that her discharged instructions states that she have to schedule a 2-3 week follow-up with the doctor.    Please call.

## 2019-09-06 NOTE — TELEPHONE ENCOUNTER
----- Message from Tereza Schmidt sent at 9/6/2019 10:01 AM CDT -----  Contact: [phone]-self  Pt called to provide fax number [phone] [facility].

## 2019-09-06 NOTE — DISCHARGE SUMMARY
[facility] General Surgery  Neuroendocrine Tumor Service  Discharge Summary      Patient Name: Parvin Corbin  MRN: [id number]  Admission Date: 8/8/2019  Hospital Length of Stay: 24 days  Discharge Date and Time: 9/1/2019  1:25 PM  Attending Physician: Cornel Beth MD    Discharging Provider: Myles De Los Santos MD  Primary Care Provider: Cecilia Ross MD     HPI: 43 y.o. woman transferred from OSH with abdominal pain and sepsis (GNR). She has a history of eddie-en-y gastric bypass 13 years ago, DM and small bowel volvulus with ischemia s/p ex lap with ischemia to significant portion of small bowel and subsequent resection with jejunostomy ~1 month prior to admission.  Hospital stay complicated by bilateral subsegmental pulmonary embolisms. Patient reports having no issues with gastric bypass until episode of abdominal pain and resulting surgery a month PTA. She now reports having a few days of abdominal pain with associated nausea/vomiting, fever/chills. Patient has been on TPN at home since discharge from hospital via PICC line. RUE PICC line removed day prior to admission due to suspected sepsis and new PICC placed to [facility].    Procedure(s) (LRB):  GASTRECTOMY, PARTIAL exploratory laparotomy, esophagogastrostomy, jejunostomy, jejunoileostomy, appenedctomy, brad gastrotstomy, liver biopsy (N/A)  LAPAROTOMY, EXPLORATORY (N/A)     Hospital Course: Patient admitted to hospital for intravenous treatment of bacteremia and nutritional optimization. Patient with intermittent small bowel obstruction 2/2 jejunostomy stenosis at the level of the fascia. Antibiotic therapy initiated, negative blood cultures obtained on HD 2. patient started on TPN and nutritional supplementation upon arrival to hospital. Ultimately patient taken to OR on HD 12 for exploratory laparotomy, eddie en y bypass takedown, jejunostomy take down, reconnection of GI tract to establish continuity. Patient tolerated procedure  well without immediate complication. Patient progressed somewhat slowly after surgery considering her gastric remnant had been excluded for such a significant amount of time. Gradually she had return of bowel function. She develop left pleural effusion that was drained by IR on POD 7. Patient continued to progress and ultimately was discharged home with home health, home PT/OT, and necessary supplies.     Consults:   Consults (From admission, onward)        Status Ordering Provider     Inpatient consult to Cardiology-[facility]  Once     Provider:  Cory Johnson MD    Completed SIVAN HUNTLEY     Inpatient consult to Infectious Diseases  Once     Provider:  (Not yet assigned)    Completed LATOYA GASPAR     Inpatient consult to Interventional Radiology  Once     Provider:  (Not yet assigned)    Completed SERGIO CASPER     Inpatient consult to Interventional Radiology  Once     Provider:  (Not yet assigned)    Completed KANDIS ROLON     Inpatient consult to Registered Dietitian/Nutritionist  Once     Provider:  (Not yet assigned)    Completed LATOYA GASPAR          Significant Diagnostic Studies: see results section of EMR    Pending Diagnostic Studies:     Procedure Component Value Units Date/Time    IR Abscess Aspiration [[id number]] Resulted:  08/28/19 1436    Order Status:  Sent Lab Status:  In process Updated:  08/28/19 1436    IR Thoracentesis with Imaging [[id number]] Resulted:  08/28/19 1433    Order Status:  Sent Lab Status:  In process Updated:  08/28/19 1436    Prealbumin [[id number]] Collected:  08/26/19 0530    Order Status:  Sent Lab Status:  No result     Specimen:  Blood         Final Active Diagnoses:    Diagnosis Date Noted POA    Open draining abdominal incision [T81.31XA] 08/22/2019 Yes    Complications of gastric bypass surgery [K91.89, Y83.2] 08/13/2019 Yes    Protein calorie malnutrition [E46] 08/08/2019 Yes    SGS (short gut syndrome) [K91.2]  Yes      Problems Resolved  During this Admission:    Diagnosis Date Noted Date Resolved POA    PRINCIPAL PROBLEM:  Sepsis [A41.9] 08/08/2019 09/01/2019 Yes    Bacteremia due to Escherichia coli [R78.81]  09/01/2019 Yes      Discharged Condition: stable    Disposition: Home or Self Care    Follow Up:  Follow-up Information     ADRIEN Fuchs MD. Schedule an appointment as soon as possible for a visit in 3 weeks.    Specialty:  General Surgery  Why:  Offices closed for Weekend. Patient to schedule own follow up appointment.   Contact information:  [address]  [address]  [place] [address]  [phone]                 Patient Instructions:      Diet Adult Regular     Notify your health care provider if you experience any of the following:  temperature >100.4     Notify your health care provider if you experience any of the following:  persistent nausea and vomiting or diarrhea     Notify your health care provider if you experience any of the following:  severe uncontrolled pain     Notify your health care provider if you experience any of the following:  redness, tenderness, or signs of infection (pain, swelling, redness, odor or green/yellow discharge around incision site)     Notify your health care provider if you experience any of the following:  difficulty breathing or increased cough     Notify your health care provider if you experience any of the following:  severe persistent headache     Notify your health care provider if you experience any of the following:  worsening rash     Notify your health care provider if you experience any of the following:  persistent dizziness, light-headedness, or visual disturbances     Notify your health care provider if you experience any of the following:  increased confusion or weakness     Tube Feedings/Formulas   Order Comments: TPN per dietary recs     Order Specific Question Answer Comments   Select Adult Formula: Other    Route: Other (see comments)      Activity as tolerated      Medications:  Reconciled Home Medications:      Medication List      START taking these medications    iron-vit c-b12-folic acid Tab  Commonly known as:  ICAR-C PLUS  Take 1 tablet by mouth once daily.     metroNIDAZOLE 500 MG tablet  Commonly known as:  FLAGYL  Take 1 tablet (500 mg total) by mouth every 8 (eight) hours. for 14 days     sterile water SolP 617.14 mL with parenteral amino acid 10% No.2 10 % SolP 108 g, dextrose 70% SolP 324.002 g  Inject 90 mL/hr into the vein continuous.        CONTINUE taking these medications    acetaminophen 500 MG tablet  Commonly known as:  TYLENOL  Take 1,000 mg by mouth every 6 (six) hours as needed for Pain.     acyclovir 400 MG tablet  Commonly known as:  ZOVIRAX  Take 400 mg by mouth 2 (two) times daily as needed (fever blisters).     ANTACID ANTI--400-40 mg/5 mL suspension  Generic drug:  aluminum & magnesium hydroxide-simethicone  Take 10 mLs by mouth every 6 (six) hours as needed for Indigestion.     CHILDREN'S CHEW MULTIVIT-IRON 15 mg iron Chew  Generic drug:  pedi multivit no.91-iron fum  Take by mouth.     chlordiazepoxide-clidinium 5-2.5 mg 5-2.5 mg Cap  Commonly known as:  LIBRAX  Take 1 capsule by mouth 3 (three) times daily with meals.     * cyanocobalamin (vitamin B-12) 1,000 mcg/mL Kit  Inject 1,000 mcg as directed every Wednesday.     * VITAMIN B-12 1000 MCG tablet  Generic drug:  cyanocobalamin  Take 1,000 mcg by mouth once daily.     ELIQUIS 5 mg Tab  Generic drug:  apixaban  Take 5 mg by mouth 2 (two) times daily.     HYDROcodone-acetaminophen  mg per tablet  Commonly known as:  NORCO  Take 1 tablet by mouth every 6 (six) hours as needed for Pain.     insulin regular 100 unit/mL injection  Commonly known as:  Humulin R  Inject 10 Units into the skin 4 (four) times daily with meals and nightly.     ondansetron 8 MG tablet  Commonly known as:  ZOFRAN  Take 8 mg by mouth 2 (two) times daily as needed for Nausea.     ranitidine 150 MG  tablet  Commonly known as:  ZANTAC  Take 150 mg by mouth 2 (two) times daily.     sucralfate 100 mg/mL suspension  Commonly known as:  CARAFATE  Take 1 g by mouth 4 (four) times daily with meals and nightly.     zolpidem 12.5 MG CR tablet  Commonly known as:  AMBIEN CR  Take 12.5 mg by mouth nightly.         * This list has 2 medication(s) that are the same as other medications prescribed for you. Read the directions carefully, and ask your doctor or other care provider to review them with you.              Myles De Los Santos MD  General Surgery  Neuroendocrine Tumor Service  [facility]

## 2019-09-06 NOTE — TELEPHONE ENCOUNTER
----- Message from Kaci Esteban sent at 9/6/2019  8:12 AM CDT -----  Contact: [phone]/self   JPB  Patient requesting to speak with you regarding her chest x-rays being abnormal . She states she has been trying to contact your office for several days. Please advise.

## 2019-09-06 NOTE — TELEPHONE ENCOUNTER
Returned patients call. Patient complained of Chest pains. Shila called the patient directly and recommended that we start the patient on Aldactone and Lasix. Scripts put in and routed to Dr. Fuchs to sign via [employer]. Patient scheduled for a follow up visit in 2 weeks. Patient has no further questions at this time.

## 2019-09-06 NOTE — TELEPHONE ENCOUNTER
Returned patients call. Confirmed faxed number and faxed orders for patient to have CTA to r/o PE. Patient has no further questions at this time.

## 2019-09-09 ENCOUNTER — PATIENT MESSAGE (OUTPATIENT)
Dept: NEUROLOGY | Facility: HOSPITAL | Age: 44
End: 2019-09-09

## 2019-09-09 DIAGNOSIS — R10.84 GENERALIZED ABDOMINAL PAIN: Primary | ICD-10-CM

## 2019-09-09 NOTE — TELEPHONE ENCOUNTER
Patient sent a portal message. She only received 20 tabs of Percocet upon discharge. She is still experencing some pain when doing her deep breathing exercises. Patient request more meds sent to her local pharmacy. Scrip routed to Dr Fuchs via [employer] and has no further questions at this time.

## 2019-09-11 RX ORDER — OXYCODONE AND ACETAMINOPHEN 5; 325 MG/1; MG/1
1 TABLET ORAL EVERY 4 HOURS PRN
Qty: 45 TABLET | Refills: 0 | Status: SHIPPED | OUTPATIENT
Start: 2019-09-11 | End: 2019-09-26 | Stop reason: SDUPTHER

## 2019-09-12 ENCOUNTER — TELEPHONE (OUTPATIENT)
Dept: NEUROLOGY | Facility: HOSPITAL | Age: 44
End: 2019-09-12

## 2019-09-12 ENCOUNTER — PATIENT MESSAGE (OUTPATIENT)
Dept: NEUROLOGY | Facility: HOSPITAL | Age: 44
End: 2019-09-12

## 2019-09-12 NOTE — TELEPHONE ENCOUNTER
RE: Nutrition Call     TPN ordered reviewed, discussed and signed by Dr Perez. Faxed to [facility] [phone] for mixing.     Patient having colonoscopy today in [place] with Dr Martha [phone] per [employer] Representative recommendation. Dr Perez aware but not in agreement with procedure so soon after surgery. Discussed this with patient who verbalizes understanding and will continue with procedure.     Patient doing well otherwise. Eating 4 small meals per day. Stool better controlled at 1-2 in daytime, 1-2 at night. Discussed increasing imodium to 1 capsule in pm and am. Also discussed starting Metamucil, soluble fiber to aid in stool bulking. Patient verbalizing understanding.  Will call patient for updates.

## 2019-09-13 ENCOUNTER — TELEPHONE (OUTPATIENT)
Dept: NEUROLOGY | Facility: HOSPITAL | Age: 44
End: 2019-09-13

## 2019-09-13 NOTE — TELEPHONE ENCOUNTER
RE: Nutrition Follow up Call    Called patient [phone]. No answer. Left  with RD number for return call.

## 2019-09-16 LAB
EXT 24 HR UR METANEPHRINE: ABNORMAL
EXT 24 HR UR NORMETANEPHRINE: ABNORMAL
EXT 24 HR UR NORMETANEPHRINE: ABNORMAL
EXT 25 HYDROXY VIT D2: ABNORMAL
EXT 25 HYDROXY VIT D3: ABNORMAL
EXT 5 HIAA 24 HR URINE: ABNORMAL
EXT 5 HIAA BLOOD: ABNORMAL
EXT ACTH: ABNORMAL
EXT AFP: ABNORMAL
EXT ALBUMIN: 3.3 G/DL (ref 3.4–4.8)
EXT ALKALINE PHOSPHATASE: 185 U/L (ref 35–104)
EXT ALT: 18 U/L (ref 0–31)
EXT AMYLASE: ABNORMAL
EXT ANTI ISLET CELL AB: ABNORMAL
EXT ANTI PARIETAL CELL AB: ABNORMAL
EXT ANTI THYROID AB: ABNORMAL
EXT AST: 13 U/L (ref 0–31)
EXT BILIRUBIN DIRECT: ABNORMAL MG/DL
EXT BILIRUBIN TOTAL: 0.2 MG/DL (ref 0–1)
EXT BK VIRUS DNA QN PCR: ABNORMAL
EXT BUN: 16.5 MG/DL (ref 6–19)
EXT C PEPTIDE: ABNORMAL
EXT CA 125: ABNORMAL
EXT CA 19-9: ABNORMAL
EXT CA 27-29: ABNORMAL
EXT CALCITONIN: ABNORMAL
EXT CALCIUM: 9 MG/DL (ref 8.4–10.2)
EXT CEA: ABNORMAL
EXT CHLORIDE: 100 MMOL/L (ref 98–107)
EXT CHOLESTEROL: ABNORMAL
EXT CHROMOGRANIN A: ABNORMAL
EXT CO2: 26 MMOL/L (ref 23–29)
EXT CREATININE UA: ABNORMAL
EXT CREATININE: 0.34 MG/DL (ref 0.7–1.6)
EXT CYCLOSPORONE LEVEL: ABNORMAL
EXT DOPAMINE: ABNORMAL
EXT EBV DNA BY PCR: ABNORMAL
EXT EPINEPHRINE: ABNORMAL
EXT FOLATE: ABNORMAL
EXT FREE T3: ABNORMAL
EXT FREE T4: ABNORMAL
EXT FSH: ABNORMAL
EXT GASTRIN RELEASING PEPTIDE: ABNORMAL
EXT GASTRIN RELEASING PEPTIDE: ABNORMAL
EXT GASTRIN: ABNORMAL
EXT GGT: ABNORMAL
EXT GHRELIN: ABNORMAL
EXT GLUCAGON: ABNORMAL
EXT GLUCOSE: 114 MG/DL (ref 70–110)
EXT GROWTH HORMONE: ABNORMAL
EXT HCV RNA QUANT PCR: ABNORMAL
EXT HDL: ABNORMAL
EXT HEMATOCRIT: 30.3 % (ref 35–45.2)
EXT HEMOGLOBIN A1C: ABNORMAL %
EXT HEMOGLOBIN: 9.1 G/DL (ref 11.9–15.3)
EXT HISTAMINE 24 HR URINE: ABNORMAL
EXT HISTAMINE: ABNORMAL
EXT IGF-1: ABNORMAL
EXT IMMUNKNOW (NON-STIMULATED): ABNORMAL
EXT IMMUNKNOW (STIMULATED): ABNORMAL
EXT INR: ABNORMAL
EXT INSULIN: ABNORMAL
EXT LANREOTIDE LEVEL: ABNORMAL
EXT LDH, TOTAL: ABNORMAL
EXT LDL CHOLESTEROL: ABNORMAL
EXT LIPASE: ABNORMAL
EXT MAGNESIUM: 1.92 MG/DL (ref 1.6–2.2)
EXT METANEPHRINE FREE PLASMA: ABNORMAL
EXT MOTILIN: ABNORMAL
EXT NEUROKININ A CAMB: ABNORMAL
EXT NEUROKININ A ISI: ABNORMAL
EXT NEUROTENSIN: ABNORMAL
EXT NOREPINEPHRINE: ABNORMAL
EXT NORMETANEPHRINE: ABNORMAL
EXT NSE: ABNORMAL
EXT OCTREOTIDE LEVEL: ABNORMAL
EXT PANCREASTATIN CAMB: ABNORMAL
EXT PANCREASTATIN ISI: ABNORMAL
EXT PANCREATIC POLYPEPTIDE: ABNORMAL
EXT PHOSPHORUS: 3.7 MG/DL (ref 2.7–4.5)
EXT PLATELETS: 602 10*3/UL (ref 160–400)
EXT POTASSIUM: 4.5 MMOL/L (ref 3.5–5.1)
EXT PROGRAF LEVEL: ABNORMAL
EXT PROLACTIN: ABNORMAL
EXT PROTEIN TOTAL: 6.9 G/DL (ref 6.5–8)
EXT PROTEIN UA: ABNORMAL
EXT PT: ABNORMAL
EXT PTH, INTACT: ABNORMAL
EXT PTT: ABNORMAL
EXT RAPAMUNE LEVEL: ABNORMAL
EXT SEROTONIN: ABNORMAL
EXT SODIUM: 138 MMOL/L (ref 136–145)
EXT SOMATOSTATIN: ABNORMAL
EXT SUBSTANCE P: ABNORMAL
EXT TRIGLYCERIDES: 110 MG/DL (ref 0–200)
EXT TRYPTASE: ABNORMAL
EXT TSH: ABNORMAL
EXT URIC ACID: ABNORMAL
EXT URINE AMYLASE U/HR: ABNORMAL
EXT URINE AMYLASE U/L: ABNORMAL
EXT VASOACTIVE INTESTINAL POLYPEPTIDE: ABNORMAL
EXT VITAMIN B12: ABNORMAL
EXT VMA 24 HR URINE: ABNORMAL
EXT WBC: 6.6 10*3/UL (ref 4.8–10.8)
NEURON SPECIFIC ENOLASE: ABNORMAL

## 2019-09-17 ENCOUNTER — CLINICAL SUPPORT (OUTPATIENT)
Dept: NEUROLOGY | Facility: HOSPITAL | Age: 44
End: 2019-09-17
Payer: COMMERCIAL

## 2019-09-17 ENCOUNTER — PATIENT MESSAGE (OUTPATIENT)
Dept: NEUROLOGY | Facility: HOSPITAL | Age: 44
End: 2019-09-17

## 2019-09-17 ENCOUNTER — OFFICE VISIT (OUTPATIENT)
Dept: NEUROLOGY | Facility: HOSPITAL | Age: 44
End: 2019-09-17
Attending: SURGERY
Payer: COMMERCIAL

## 2019-09-17 VITALS
HEIGHT: 65 IN | WEIGHT: 173.75 LBS | TEMPERATURE: 99 F | HEART RATE: 105 BPM | DIASTOLIC BLOOD PRESSURE: 79 MMHG | SYSTOLIC BLOOD PRESSURE: 113 MMHG | BODY MASS INDEX: 28.95 KG/M2

## 2019-09-17 DIAGNOSIS — B37.81 CANDIDA ESOPHAGITIS: Primary | ICD-10-CM

## 2019-09-17 DIAGNOSIS — K90.829 SGS (SHORT GUT SYNDROME): ICD-10-CM

## 2019-09-17 PROCEDURE — 99213 OFFICE O/P EST LOW 20 MIN: CPT | Performed by: SURGERY

## 2019-09-17 PROCEDURE — 99211 OFF/OP EST MAY X REQ PHY/QHP: CPT | Mod: 27

## 2019-09-17 RX ORDER — CHOLESTYRAMINE 4 G/9G
POWDER, FOR SUSPENSION ORAL
Qty: 270 PACKET | Refills: 3 | Status: SHIPPED | OUTPATIENT
Start: 2019-09-17 | End: 2019-11-11

## 2019-09-17 RX ORDER — CLOTRIMAZOLE 10 MG/1
10 LOZENGE ORAL; TOPICAL NIGHTLY
Qty: 14 TABLET | Refills: 0 | Status: SHIPPED | OUTPATIENT
Start: 2019-09-17 | End: 2019-09-17 | Stop reason: CLARIF

## 2019-09-17 RX ORDER — CLOTRIMAZOLE 10 MG/1
10 LOZENGE ORAL; TOPICAL 4 TIMES DAILY
Qty: 60 TABLET | Refills: 0 | Status: SHIPPED | OUTPATIENT
Start: 2019-09-17 | End: 2019-10-01

## 2019-09-17 NOTE — PROGRESS NOTES
MRN: [id number]    Referring Physician:  KANDIS Fuchs MD    Reason for Visit: Nutrition Consult for Post Operative Follow Up and Home TPN    Pertinent Social History: Patient is independent with activities of daily living. She lives with her  and children who are available to assist with meals and medical needs. Patient continues on home TPN x 14 hrs. 200 gm dex, 120 gm protein, 35 gm fat provides 1510 calories. TV 1440 mL. Patient reports she is eating well at this time. She is following the diet provided to her in the hospital on Nutrition Therapy for Short Bowel Syndrome for patient's with a Colon.     Previous Medical History:  Past Medical History:   Diagnosis Date    ADD (attention deficit disorder)     Anxiety     Diabetes     Insomnia        Previous Surgical History:  Past Surgical History:   Procedure Laterality Date    CHOLECYSTECTOMY      ENDOMETRIAL ABLATION      EXPLORATORY LAPAROTOMY  2019    SBR -ischemic injury    GASTRECTOMY, PARTIAL exploratory laparotomy, esophagogastrostomy, jejunostomy, jejunoileostomy, appenedctomy, brad gastrotstomy, liver biopsy N/A 2019    Performed by [facility] at [facility] OR    GASTRIC BYPASS      LAPAROTOMY, EXPLORATORY N/A 2019    Performed by [facility]ger [facility] at [facility] OR    [place]  DELIVERY ONLY      , Low Cervical    TUBAL LIGATION         Medication:    acetaminophen (TYLENOL) 500 MG tablet, Take 1,000 mg by mouth every 6 (six) hours as needed for Pain., Disp: , Rfl:     acyclovir (ZOVIRAX) 400 MG tablet, Take 400 mg by mouth 2 (two) times daily as needed (fever blisters). , Disp: , Rfl:     apixaban (ELIQUIS) 5 mg Tab, Take 5 mg by mouth 2 (two) times daily., Disp: , Rfl:     chlordiazepoxide-clidinium 5-2.5 mg (LIBRAX) 5-2.5 mg Cap, Take 1 capsule by mouth 3 (three) times daily with meals., Disp: , Rfl:     furosemide (LASIX) 40 MG tablet, Take 1 tablet (40 mg total) by mouth 2  (two) times daily., Disp: 20 tablet, Rfl: 1    HYDROcodone-acetaminophen (NORCO)  mg per tablet, Take 1 tablet by mouth every 6 (six) hours as needed for Pain., Disp: , Rfl:     ondansetron (ZOFRAN) 8 MG tablet, Take 8 mg by mouth 2 (two) times daily as needed for Nausea., Disp: , Rfl:     oxyCODONE-acetaminophen (PERCOCET) 5-325 mg per tablet, Take 1 tablet by mouth every 4 (four) hours as needed (pain)., Disp: 45 tablet, Rfl: 0    ranitidine (ZANTAC) 150 MG tablet, Take 1 tablet (150 mg total) by mouth 2 (two) times daily., Disp: 60 tablet, Rfl: 11    spironolactone (ALDACTONE) 50 MG tablet, Take 1 tablet (50 mg total) by mouth once daily., Disp: 20 tablet, Rfl: 1    sterile water SolP 617.14 mL with parenteral amino acid 10% No.2 10 % SolP 108 g, dextrose 70% SolP 324.002 g, Inject 90 mL/hr into the vein continuous., Disp: 1 Units, Rfl: 0    sucralfate (CARAFATE) 100 mg/mL suspension, Take 1 g by mouth 4 (four) times daily with meals and nightly., Disp: , Rfl:     zolpidem (AMBIEN CR) 12.5 MG CR tablet, Take 12.5 mg by mouth nightly., Disp: , Rfl:       Vitamin/Supplements/Herbs:     aluminum & magnesium hydroxide-simethicone (ANTACID ANTI-GAS) 400-400-40 mg/5 mL suspension, Take 10 mLs by mouth every 6 (six) hours as needed for Indigestion., Disp: , Rfl:     cyanocobalamin (VITAMIN B-12) 1000 MCG tablet, Take 1,000 mcg by mouth once daily., Disp: , Rfl:     cyanocobalamin, vitamin B-12, 1,000 mcg/mL Kit, Inject 1,000 mcg as directed every 30 days. , Disp: , Rfl:     insulin regular 100 unit/mL Inj injection, Inject 10 Units into the skin 4 (four) times daily with meals and nightly., Disp: , Rfl:     iron-vit c-vit b12-folic acid (IRON-C PLUS) tablet, Take 1 tablet by mouth once daily., Disp: 30 tablet, Rfl: 3    pedi multivit no.91-iron fum (CHILDREN'S CHEW MULTIVIT-IRON) 15 mg iron Chew, Take by mouth., Disp: , Rfl:          Allergies: No Known Allergies      Labs: Updated labs  "pending                : 1975  Age: 44 y.o.    Anthropometrics  Weight: 78.8 kg (173 lb 11.6 oz)  Height: 5' 5" (1.651 m)    Estimated body mass index is 28.91 kg/m²     BMI Weight Status   Below 18.5 Underweight   18.6-24.9 Normal/Healthy   25.0-29.9 Overweight   30.0-34.9 Obesity Class I   35.0-39.9 Obesity Class II   >40 Extreme Obesity   Class III     Ideal Weight Range for Your Height: 5'5" = 114 - 149 lbs      Weight History:  Weight has decreased 14 pounds over last 2 weeks however, supect related to fluid losses.     Wt Readings from Last 12 Encounters:   19 78.8 kg (173 lb 11.6 oz)   19 85.8 kg (189 lb 2.5 oz)   19 77.1 kg (170 lb)       Estimated Nutrition Needs:   Energy Needs: 2290 (1431 MSJ x 1.6 PAL)  Protein Needs: 95 to 120 (1.2-1.5 gm Protein/kg)  Fluid Needs: 2290 (1 mL/calorie)    Malnutrition Assessment:  Energy Intake: <50% of estimated energy requirement for 1 month   Body Fat Depletion: mild depletion of orbitals and triceps   Muscle Mass Depletion: moderate depletion of temples, clavicle region, interosseous muscle and lower extremities   Weight Loss: 7% x 2 weeks suspected to be mostly related to fluid losses post hospitalization.    Fluid Accumulation: mild    Gastrointestinal Habits: loose Diarrhea, Loose, Leakage at night Described as Type 5 to 6 on [place] Stool Form Scale.     Nutrition Symptoms: diarrhea, taste changes and weight loss  Nutrition Status: weight loss > 15% AND albumin <= 3.2 g/dL      Nutrition History    Meal Pattern: 6 small meals per day  Beverage Intake: Water, Sugar Free Sodas     Food Intolerance: Avoiding foods not allowed on list    Meal Preparation: Patient or   Dining Out: 1x day    Dentition: normal dentition for age                Difficulty chewing or swallowing? none  Exercise: Physical ability to complete tasks for meal preparation, Physical ability to self-feed, Cognitive ability to complete tasks for meal preparation, " Remembers to eat, recalls eating and Type of physical activity-not active      Motivation to Change: Expect good compliance to diet recommendations as patient showing motivation to improve GI symptoms.       Nutrition Diagnosis  Nutrition Problem  Altered GI function      Related to (etiology):   Extensive lysis of adhesion.  Takedown of ileostomy   Esophagogastrostomy with 29 mm end-to-end anastomosis with AmnioFix around.  Jejunal anastomosis to ileostomy about 20 cm, terminal ileum of about 5 cm   attached to the cecum with intact ileocecal valve.  Final small bowel length   after anastomosis of 125 cm.    Signs and Symptoms (as evidenced by):   Rapid transit time  Home TPN for supplemental nutrition    Interventions:  General/healthful diet, Fiber-modified diet and Vitamin-modified diet        Nutrition Diagnosis Status:   Improving        Recommendations:  1. Decreased TPN calories to 1500. Discussed with GEORGE Shine at [facility].    2. Discussed slowly incorporating favorite foods into diet. Monitor for tolerance and avoid if contributes to GI symptoms such as diarrhea  3. Discussed increasing imodium at night to aid in stopping night time BM  4. Discussed following up with Dr Mike for Colonoscopy procedure.   5. RD contact information provided for questions. RD added to Care Team.       Goals:   1. Adequate oral nutrition to wean TPN.       Routed to KANDIS Fuchs MD      Consultation Time: 30 minutes.      Follow Up: 1 months.

## 2019-09-17 NOTE — PATIENT INSTRUCTIONS
Return Clinic Appointment: in 3 weeks with Dr. Fuchs - appointment made    Medications: (called in to home pharmacy)  Cholestyramine 1/2 pack it with meals  Mycelex troches PC and HS    Notes:   Continue TPN  Salt soda swish gargle spit and scrubbed tongue PC/HS

## 2019-09-17 NOTE — PROGRESS NOTES
"NOLANETS:  [facility]  A collaboration between [facility] and [facility]      PATIENT: Parvin Corbin  MRN: [id number]  DATE: 2019    Subjective:      Chief Complaint:  Food has no taste.        Vitals: Blood pressure 113/79, pulse 105, temperature 99.2 °F (37.3 °C), temperature source Oral, height 5' 5" (1.651 m), weight 78.8 kg (173 lb 11.6 oz).     ECOG Score: 1 - Symptomatic but completely ambulatory    Diagnosis:   1. Candida esophagitis    2. SGS (short gut syndrome)         Oncologic History: NA    Interval History:  3 weeks status post reversal of gastric bypass, and proximal jejunostomy.  Complains that food has no taste.  Weakness is improving in appetite is improving slowly. Still on home TPN at night for malnutrition and short gut.     Past Medical History:  Past Medical History:   Diagnosis Date    ADD (attention deficit disorder)     Anxiety     Diabetes     Insomnia        Past Surgical History:  Past Surgical History:   Procedure Laterality Date    CHOLECYSTECTOMY      ENDOMETRIAL ABLATION      EXPLORATORY LAPAROTOMY  2019    SBR -ischemic injury    GASTRECTOMY, PARTIAL exploratory laparotomy, esophagogastrostomy, jejunostomy, jejunoileostomy, appenedctomy, brad gastrotstomy, liver biopsy N/A 2019    Performed by Kirit [facility]ger [facility] at [facility] OR    GASTRIC BYPASS      LAPAROTOMY, EXPLORATORY N/A 2019    Performed by [facility]ger [facility] at [facility] OR    MA  DELIVERY ONLY      , Low Cervical    TUBAL LIGATION         Family History:  History reviewed. No pertinent family history.    Allergies:  Patient has no known allergies.    Medications:  Current Outpatient Medications   Medication Sig    apixaban (ELIQUIS) 5 mg Tab Take 5 mg by mouth 2 (two) times daily.    cyanocobalamin, vitamin B-12, 1,000 mcg/mL Kit Inject 1,000 mcg as directed every 30 " days.     insulin regular 100 unit/mL Inj injection Inject 10 Units into the skin 4 (four) times daily with meals and nightly.    ondansetron (ZOFRAN) 8 MG tablet Take 8 mg by mouth 2 (two) times daily as needed for Nausea.    oxyCODONE-acetaminophen (PERCOCET) 5-325 mg per tablet Take 1 tablet by mouth every 4 (four) hours as needed (pain).    ranitidine (ZANTAC) 150 MG tablet Take 1 tablet (150 mg total) by mouth 2 (two) times daily.    sterile water SolP 617.14 mL with parenteral amino acid 10% No.2 10 % SolP 108 g, dextrose 70% SolP 324.002 g Inject 90 mL/hr into the vein continuous.    acetaminophen (TYLENOL) 500 MG tablet Take 1,000 mg by mouth every 6 (six) hours as needed for Pain.    acyclovir (ZOVIRAX) 400 MG tablet Take 400 mg by mouth 2 (two) times daily as needed (fever blisters).     aluminum & magnesium hydroxide-simethicone (ANTACID ANTI-GAS) 400-400-40 mg/5 mL suspension Take 10 mLs by mouth every 6 (six) hours as needed for Indigestion.    chlordiazepoxide-clidinium 5-2.5 mg (LIBRAX) 5-2.5 mg Cap Take 1 capsule by mouth 3 (three) times daily with meals.    cyanocobalamin (VITAMIN B-12) 1000 MCG tablet Take 1,000 mcg by mouth once daily.    furosemide (LASIX) 40 MG tablet Take 1 tablet (40 mg total) by mouth 2 (two) times daily.    HYDROcodone-acetaminophen (NORCO)  mg per tablet Take 1 tablet by mouth every 6 (six) hours as needed for Pain.    iron-vit c-vit b12-folic acid (IRON-C PLUS) tablet Take 1 tablet by mouth once daily.    pedi multivit no.91-iron fum (CHILDREN'S CHEW MULTIVIT-IRON) 15 mg iron Chew Take by mouth.    spironolactone (ALDACTONE) 50 MG tablet Take 1 tablet (50 mg total) by mouth once daily.    sucralfate (CARAFATE) 100 mg/mL suspension Take 1 g by mouth 4 (four) times daily with meals and nightly.    zolpidem (AMBIEN CR) 12.5 MG CR tablet Take 12.5 mg by mouth nightly.     No current facility-administered medications for this visit.         Review of  Systems   Constitutional: Positive for activity change, appetite change and fatigue.   HENT: Negative for congestion, facial swelling and sinus pressure.    Eyes: Negative for discharge.   Respiratory: Negative for chest tightness and shortness of breath.    Cardiovascular: Negative for chest pain, palpitations and leg swelling.   Gastrointestinal: Positive for diarrhea. Negative for abdominal distention and abdominal pain.   Endocrine: Negative for cold intolerance, heat intolerance and polydipsia.   Genitourinary: Negative for difficulty urinating.   Musculoskeletal: Positive for arthralgias.   Skin: Negative for color change.   Allergic/Immunologic: Negative for environmental allergies, food allergies and immunocompromised state.   Neurological: Negative for dizziness, seizures and headaches.   Hematological: Negative for adenopathy. Does not bruise/bleed easily.   Psychiatric/Behavioral: Negative for behavioral problems, confusion and dysphoric mood. The patient is not nervous/anxious.       Objective:      Physical Exam   Constitutional: She is oriented to person, place, and time. She appears well-developed and well-nourished.   HENT:   Mouth/Throat: Oropharyngeal exudate (Plaque on tongue consistent with Candida) present.   Eyes: No scleral icterus.   Neck: No JVD present. No tracheal deviation present.   Cardiovascular: Normal rate and regular rhythm.   Pulmonary/Chest: Effort normal. She has no wheezes.   Abdominal: Soft. Bowel sounds are normal. She exhibits no distension. No hernia.   Scar healing well, ostomy site good granulation nearly closed,  Steri-Stripped closed today   Musculoskeletal: Normal range of motion.   Neurological: She is alert and oriented to person, place, and time.   Skin: Skin is warm and dry.   Psychiatric: She has a normal mood and affect. Her behavior is normal. Judgment and thought content normal.   Nursing note and vitals reviewed.     Assessment:       1. Candida esophagitis     2. SGS (short gut syndrome)        Laboratory Data:  Neuroendocrine Labs Latest Ref Rng & Units 9/17/2019 9/5/2019 9/1/2019 8/31/2019 8/30/2019 8/30/2019 8/29/2019   FOLATE 4.0 - 24.0 ng/mL - - - - - - -   VITAMIN B12 210 - 950 pg/mL - - - - - - -   PTH, INTACT 9.0 - 77.0 pg/mL - - - - - - -   WBC 3.90 - 12.70 K/uL - - 15.14(H) 24.36(H) 16.11(H) 16.11(H) 15.83(H)   EXT WBC 4.8 - 10.8 10*3/ul - 13.2(A) - - - - -   HGB 12.0 - 16.0 g/dL - - 8.3(L) 8.6(L) 7.1(L) 7.1(L) 7.5(L)   EXT HGB 11.9 - 15.3 g/dL - 9.0(A) - - - - -   HCT 37.0 - 48.5 % - - 26.7(L) 28.0(L) 22.9(L) 22.9(L) 24.2(L)   EXT HCT 35.0 - 45.2 g/dL - 29.4(A) - - - - -   PLATLETS 150 - 350 K/uL - - 536(H) 342 454(H) 454(H) 424(H)   EXT PLATLETS 160 - 400 10*3/uL - 842(A) - - - - -   GLUCOSE 70 - 110 mg/dL - - 155(H) 140(H) 150(H) 150(H) 124(H)   EXT GLUCOSE 70 - 110 mg/dL - 108 - - - - -   BUN 6 - 20 mg/dL - - 9 10 9 9 17   EXT BUN 6.0 - 19.0 mg/dL - 14.9 - - - - -   CREATININE 0.5 - 1.4 mg/dL - - 0.5 0.5 0.5 0.5 0.5   EXT CREATININE 0.7 - 1.6 mg/dL - 0.38(A) - - - - -    - 145 mmol/L - - 137 137 138 138 139   EXT  - 145 mmol/L - 134(A) - - - - -   K 3.5 - 5.1 mmol/L - - 3.9 3.5 3.3(L) 3.3(L) 3.5   EXT K 3.5 - 5.1 mmol/L - 4.6 - - - - -   CHLORIDE 95 - 110 mmol/L - - 104 105 102 102 102   EXT CHLORIDE 98 - 107 mmol/L - 95(A) - - - - -   CO2 23 - 29 mmol/L - - 22(L) 22(L) 29 29 28   EXT CO2 23 - 29 mmol/L - 23 - - - - -   CALCIUM 8.7 - 10.5 mg/dL - - 8.7 8.3(L) 8.4(L) 8.4(L) 7.9(L)   EXT CALCIUM 8.5 - 10.1 mg/dl - - - - - - -   PROTEIN, TOTAL 6.0 - 8.4 g/dL - - 6.2 5.9(L) 5.5(L) 5.5(L) 5.0(L)   EXT PROTEIN, TOTAL 6.4 - 8.2 g/dl - - - - - - -   PHOSPHORUS 2.7 - 4.5 mg/dL - - 4.2 3.3 4.1 4.1 4.4   ALBUMIN 3.5 - 5.2 g/dL - - 2.0(L) 2.1(L) 2.1(L) 2.1(L) 1.5(L)   EXT ALBUMIN 3.40 - 5.00 g/dl - - - - - - -   TOTAL BILIRUBIN 0.1 - 1.0 mg/dL - - 0.3 0.6 0.3 0.3 0.3   EXT TOTAL BILIRUBIN 0.20 - 1.00 mg/dl - - - - - - -   ALK PHOSPHATASE 55 - 135 U/L - -  225(H) 278(H) 362(H) 362(H) 319(H)   EXT ALK PHOSPHATASE 15 - 37 iu/l - - - - - - -   EXT GGT 1.2 - 2.3 g/dl - - - - - - -   SGOT (AST) 10 - 40 U/L - - 14 22 19 19 22   EXT SGOT (AST) 15 - 37 iu/l - - - - - - -   SGPT (ALT) 10 - 44 U/L - - 22 27 30 30 36   EXT ALT 14 - 59 iu/l - - - - - - -   EXT LIPASE 73 - 393 iu/l - - - - - - -   EXT AMYLASE, SERUM 25 - 115 iu/l - - - - - - -   TRIGLYCERIDES 30 - 150 mg/dL - - - - - 121 -   MG 1.6 - 2.6 mg/dL - - 1.5(L) 1.4(L) 1.5(L) 1.5(L) 1.8   Weight - 173 lbs 12 oz - 189 lbs 2 oz - - - -     PREALBUMIN 13  Alb 3.3  H/H 9/30    Impression:  Yeast esophagitis glossitis, bile salt diarrhea     Short gut syndrome requiring TPN  Improving slowly.  Plan:       Cholestyramine 1/2 pack it with meals  Mycelex troches PC and HS  Salt soda swish gargle spit and scrubbed tongue PC/HS      Continue TPN  RTC in 3 weeks to consider removing G-tube and possibly risk discontinuing PICC line/TPN depending upon nutritional status  CBC CMP and pre-albumin today      KANDIS Fuchs MD, FACS  Professor of Surgery, [facility]  Neuroendocrine Surgery, Hepatic/Pancreatic & General Surgery  [address], [address]  [place]  [address]  ph. [phone]; [phone]  fax. [phone]

## 2019-09-20 NOTE — PHYSICIAN QUERY
PT Name: Parvin Corbin  MR #: [id number]     Physician Query Form - Diagnosis Clarification      CDS/: PAUL Hernandez, RN, CCDS               Contact information: [email or web address]    This form is a permanent document in the medical record.     Query Date: September 20, 2019    By submitting this query, we are merely seeking further clarification of documentation.  Please utilize your independent clinical judgment when addressing the question(s) below.     The medical record contains the following:      Findings Supporting Clinical Information Location in Medical Record   Pneumonia-Per 8/26 Surgery Progress Note by Dr. De Los Santos/Dr. Unger Her pain better with IV medications or meds since not getting up soft   Has pneumonia with atelectasis     Continue Cefepime and Fluconazole. Start azithromycin for pneumonia coverage     Continue azithromycin for pneumonia coverage       Azithromycin IV    Pre Op Diagnosis: 1. Left pleural effusion 2. Intra-peritoneal complex fluid collection  Post Op Diagnosis: Same    No growth    No growth    Rare WBC's, No organisms seen    No fungus isolated after 2 weeks      She develop left pleural effusion that was drained by IR on POD 7. Patient continued to progress and ultimately was discharged home with home health, home PT/OT, and necessary supplies.     Start taking these medications:    Iron-vit c-b12-folic acid   Take 1 tablet by mouth once daily.    Metronidazole   Take 1 tablet by mouth every 8 hours for 14 days    sterile water SolP 617.14 mL with parenteral amino acid 10% No.2 10 % SolP 108 g, dextrose 70% SolP 324.002 g  8/26 Surgery Progress Note by Dr. De Los Santos/Dr. Unger      8/27 Surgery Progress Note by Dr. De Los Santos/Dr. Unger    8/30 Surgery Progress Note by Dr. De Los Santos/Dr. Unger    8/26-8/30 MAR    8/28 IR Procedure Note        8/28 Aerobic culture Pleural Fluid, left lung  8/28 Anaerobic culture Pleural Fluid, left lung  8/28 Gram Stain Result  Pleural Fluid, left lung  8/28 Fungus Culture Pleural Fluid, left lung    9/1 Surgery Discharge Summary by Dr. De Los Santos/Dr. Unger  filed 9/5       Please clarify if the Pneumonia diagnosis has been:    [xx  ] Ruled In   [  ] Ruled In, Now Resolved   [  ] Ruled Out   [  ] Other/Clarification of findings (please specify):     [  ] Clinically undetermined     Please document in your progress notes daily for the duration of treatment, until resolved, and include in your discharge summary.

## 2019-09-23 LAB
EXT 24 HR UR METANEPHRINE: ABNORMAL
EXT 24 HR UR NORMETANEPHRINE: ABNORMAL
EXT 24 HR UR NORMETANEPHRINE: ABNORMAL
EXT 25 HYDROXY VIT D2: ABNORMAL
EXT 25 HYDROXY VIT D3: ABNORMAL
EXT 5 HIAA 24 HR URINE: ABNORMAL
EXT 5 HIAA BLOOD: ABNORMAL
EXT ACTH: ABNORMAL
EXT AFP: ABNORMAL
EXT ALBUMIN: 3.3 G/DL (ref 3.4–4.8)
EXT ALKALINE PHOSPHATASE: 114 MG/DL (ref 0–200)
EXT ALT: 18 U/L (ref 0–31)
EXT AMYLASE: ABNORMAL
EXT ANTI ISLET CELL AB: ABNORMAL
EXT ANTI PARIETAL CELL AB: ABNORMAL
EXT ANTI THYROID AB: ABNORMAL
EXT AST: 13 U/L (ref 0–31)
EXT BILIRUBIN DIRECT: ABNORMAL
EXT BILIRUBIN TOTAL: 0.2 MG/DL (ref 0–0.1)
EXT BK VIRUS DNA QN PCR: ABNORMAL
EXT BUN: 18.9 MG/DL (ref 6–19)
EXT C PEPTIDE: ABNORMAL
EXT CA 125: ABNORMAL
EXT CA 19-9: ABNORMAL
EXT CA 27-29: ABNORMAL
EXT CALCITONIN: ABNORMAL
EXT CALCIUM: 9 MG/DL (ref 8.4–10.2)
EXT CEA: ABNORMAL
EXT CHLORIDE: 99 MMOL/L (ref 98–107)
EXT CHOLESTEROL: ABNORMAL
EXT CHROMOGRANIN A: ABNORMAL
EXT CO2: 26 MMOL/L (ref 23–29)
EXT CREATININE UA: ABNORMAL
EXT CREATININE: 0.32 MG/DL (ref 0.7–1.6)
EXT CYCLOSPORONE LEVEL: ABNORMAL
EXT DOPAMINE: ABNORMAL
EXT EBV DNA BY PCR: ABNORMAL
EXT EPINEPHRINE: ABNORMAL
EXT FOLATE: ABNORMAL
EXT FREE T3: ABNORMAL
EXT FREE T4: ABNORMAL
EXT FSH: ABNORMAL
EXT GASTRIN RELEASING PEPTIDE: ABNORMAL
EXT GASTRIN RELEASING PEPTIDE: ABNORMAL
EXT GASTRIN: ABNORMAL
EXT GGT: ABNORMAL
EXT GHRELIN: ABNORMAL
EXT GLUCAGON: ABNORMAL
EXT GLUCOSE: 117 MG/DL (ref 70–110)
EXT GROWTH HORMONE: ABNORMAL
EXT HCV RNA QUANT PCR: ABNORMAL
EXT HDL: ABNORMAL
EXT HEMATOCRIT: 30.9 % (ref 35–45.2)
EXT HEMOGLOBIN A1C: ABNORMAL
EXT HEMOGLOBIN: 9.3 G/DL (ref 11.9–15.3)
EXT HISTAMINE 24 HR URINE: ABNORMAL
EXT HISTAMINE: ABNORMAL
EXT IGF-1: ABNORMAL
EXT IMMUNKNOW (NON-STIMULATED): ABNORMAL
EXT IMMUNKNOW (STIMULATED): ABNORMAL
EXT INR: ABNORMAL
EXT INSULIN: ABNORMAL
EXT LANREOTIDE LEVEL: ABNORMAL
EXT LDH, TOTAL: ABNORMAL
EXT LDL CHOLESTEROL: ABNORMAL
EXT LIPASE: ABNORMAL
EXT MAGNESIUM: 1.97 MG/DL (ref 1.6–2.2)
EXT METANEPHRINE FREE PLASMA: ABNORMAL
EXT MOTILIN: ABNORMAL
EXT NEUROKININ A CAMB: ABNORMAL
EXT NEUROKININ A ISI: ABNORMAL
EXT NEUROTENSIN: ABNORMAL
EXT NOREPINEPHRINE: ABNORMAL
EXT NORMETANEPHRINE: ABNORMAL
EXT NSE: ABNORMAL
EXT OCTREOTIDE LEVEL: ABNORMAL
EXT PANCREASTATIN CAMB: ABNORMAL
EXT PANCREASTATIN ISI: ABNORMAL
EXT PANCREATIC POLYPEPTIDE: ABNORMAL
EXT PHOSPHORUS: 3.5 MG/DL (ref 2.7–4.5)
EXT PLATELETS: 469 10*3/UL (ref 160–400)
EXT POTASSIUM: 4.9 MMOL/L (ref 3.5–5.1)
EXT PROGRAF LEVEL: ABNORMAL
EXT PROLACTIN: ABNORMAL
EXT PROTEIN TOTAL: 7.1 G/DL (ref 6.5–8)
EXT PROTEIN UA: ABNORMAL
EXT PT: ABNORMAL
EXT PTH, INTACT: ABNORMAL
EXT PTT: ABNORMAL
EXT RAPAMUNE LEVEL: ABNORMAL
EXT SEROTONIN: ABNORMAL
EXT SODIUM: 138 MMOL/L (ref 136–145)
EXT SOMATOSTATIN: ABNORMAL
EXT SUBSTANCE P: ABNORMAL
EXT TRIGLYCERIDES: ABNORMAL
EXT TRYPTASE: ABNORMAL
EXT TSH: ABNORMAL
EXT URIC ACID: ABNORMAL
EXT URINE AMYLASE U/HR: ABNORMAL
EXT URINE AMYLASE U/L: ABNORMAL
EXT VASOACTIVE INTESTINAL POLYPEPTIDE: ABNORMAL
EXT VITAMIN B12: ABNORMAL
EXT VMA 24 HR URINE: ABNORMAL
EXT WBC: 6.3 10*3/UL (ref 4.8–10.8)
NEURON SPECIFIC ENOLASE: ABNORMAL

## 2019-09-26 ENCOUNTER — PATIENT MESSAGE (OUTPATIENT)
Dept: NEUROLOGY | Facility: HOSPITAL | Age: 44
End: 2019-09-26

## 2019-09-26 ENCOUNTER — TELEPHONE (OUTPATIENT)
Dept: NEUROLOGY | Facility: HOSPITAL | Age: 44
End: 2019-09-26

## 2019-09-26 DIAGNOSIS — R10.84 GENERALIZED ABDOMINAL PAIN: ICD-10-CM

## 2019-09-26 RX ORDER — OXYCODONE AND ACETAMINOPHEN 5; 325 MG/1; MG/1
1 TABLET ORAL EVERY 4 HOURS PRN
Qty: 45 TABLET | Refills: 0 | Status: SHIPPED | OUTPATIENT
Start: 2019-09-26

## 2019-09-26 NOTE — TELEPHONE ENCOUNTER
RE: Follow Up Call     Called patient's cell but no answer. Left Voice Message with RD number for return call.

## 2019-09-27 ENCOUNTER — TELEPHONE (OUTPATIENT)
Dept: NEUROLOGY | Facility: HOSPITAL | Age: 44
End: 2019-09-27

## 2019-09-27 NOTE — TELEPHONE ENCOUNTER
RE: Follow Up Nutrition Call for Home TPN    Spoke with patient who reports she is eating much better. Per diet recall, patient consuming yogurt-bid, 1/2 sandwich, cheese, meat, etc. She would like to decrease the hours she is on TPN. Discussed with Fátima Amaya RD with [employer]. New TPN bags to be delivered today. May adjust infusion time to 12 hrs. Patient to call for questions or significant weight changes. CBW 169lbs. Patient noted to have +edema post discharge.       Wt Readings from Last 12 Encounters:   09/17/19 78.8 kg (173 lb 11.6 oz)   09/01/19 85.8 kg (189 lb 2.5 oz)   08/08/19 77.1 kg (170 lb)

## 2019-10-01 LAB
FUNGUS BLD CULT: NORMAL
FUNGUS SPEC CULT: ABNORMAL
FUNGUS SPEC CULT: NORMAL

## 2019-10-03 ENCOUNTER — PATIENT MESSAGE (OUTPATIENT)
Dept: NEUROLOGY | Facility: HOSPITAL | Age: 44
End: 2019-10-03

## 2019-10-04 ENCOUNTER — PATIENT MESSAGE (OUTPATIENT)
Dept: NEUROLOGY | Facility: HOSPITAL | Age: 44
End: 2019-10-04

## 2019-10-07 LAB
EXT 24 HR UR METANEPHRINE: ABNORMAL
EXT 24 HR UR NORMETANEPHRINE: ABNORMAL
EXT 24 HR UR NORMETANEPHRINE: ABNORMAL
EXT 25 HYDROXY VIT D2: ABNORMAL
EXT 25 HYDROXY VIT D3: ABNORMAL
EXT 5 HIAA 24 HR URINE: ABNORMAL
EXT 5 HIAA BLOOD: ABNORMAL
EXT ACTH: ABNORMAL
EXT AFP: ABNORMAL
EXT ALBUMIN: 3.7 G/DL (ref 3.4–4.8)
EXT ALKALINE PHOSPHATASE: 177 U/L (ref 35–104)
EXT ALT: 86 U/L (ref 0–31)
EXT AMYLASE: ABNORMAL
EXT ANTI ISLET CELL AB: ABNORMAL
EXT ANTI PARIETAL CELL AB: ABNORMAL
EXT ANTI THYROID AB: ABNORMAL
EXT AST: 45 U/L (ref 0–31)
EXT BILIRUBIN DIRECT: ABNORMAL
EXT BILIRUBIN TOTAL: 0.3 MG/DL (ref 0–1)
EXT BK VIRUS DNA QN PCR: ABNORMAL
EXT BUN: 22 MG/DL (ref 6–19)
EXT C PEPTIDE: ABNORMAL
EXT CA 125: ABNORMAL
EXT CA 19-9: ABNORMAL
EXT CA 27-29: ABNORMAL
EXT CALCITONIN: ABNORMAL
EXT CALCIUM: 9.3 MG/DL (ref 8.4–10.2)
EXT CEA: ABNORMAL
EXT CHLORIDE: 100 MMOL/L (ref 98–107)
EXT CHOLESTEROL: ABNORMAL
EXT CHROMOGRANIN A: ABNORMAL
EXT CO2: 22 MMOL/L (ref 23–29)
EXT CREATININE UA: ABNORMAL
EXT CREATININE: 0.46 MG/DL (ref 0.7–1.6)
EXT CYCLOSPORONE LEVEL: ABNORMAL
EXT DOPAMINE: ABNORMAL
EXT EBV DNA BY PCR: ABNORMAL
EXT EPINEPHRINE: ABNORMAL
EXT FOLATE: ABNORMAL
EXT FREE T3: ABNORMAL
EXT FREE T4: ABNORMAL
EXT FSH: ABNORMAL
EXT GASTRIN RELEASING PEPTIDE: ABNORMAL
EXT GASTRIN RELEASING PEPTIDE: ABNORMAL
EXT GASTRIN: ABNORMAL
EXT GGT: ABNORMAL
EXT GHRELIN: ABNORMAL
EXT GLUCAGON: ABNORMAL
EXT GLUCOSE: 103 MG/DL (ref 70–110)
EXT GROWTH HORMONE: ABNORMAL
EXT HCV RNA QUANT PCR: ABNORMAL
EXT HDL: ABNORMAL
EXT HEMATOCRIT: 35.9 % (ref 35–45.2)
EXT HEMOGLOBIN A1C: ABNORMAL
EXT HEMOGLOBIN: 11.1 G/DL (ref 11.9–15.3)
EXT HISTAMINE 24 HR URINE: ABNORMAL
EXT HISTAMINE: ABNORMAL
EXT IGF-1: ABNORMAL
EXT IMMUNKNOW (NON-STIMULATED): ABNORMAL
EXT IMMUNKNOW (STIMULATED): ABNORMAL
EXT INR: ABNORMAL
EXT INSULIN: ABNORMAL
EXT LANREOTIDE LEVEL: ABNORMAL
EXT LDH, TOTAL: ABNORMAL
EXT LDL CHOLESTEROL: ABNORMAL
EXT LIPASE: ABNORMAL
EXT MAGNESIUM: 2 MG/DL (ref 1.6–2.2)
EXT METANEPHRINE FREE PLASMA: ABNORMAL
EXT MOTILIN: ABNORMAL
EXT NEUROKININ A CAMB: ABNORMAL
EXT NEUROKININ A ISI: ABNORMAL
EXT NEUROTENSIN: ABNORMAL
EXT NOREPINEPHRINE: ABNORMAL
EXT NORMETANEPHRINE: ABNORMAL
EXT NSE: ABNORMAL
EXT OCTREOTIDE LEVEL: ABNORMAL
EXT PANCREASTATIN CAMB: ABNORMAL
EXT PANCREASTATIN ISI: ABNORMAL
EXT PANCREATIC POLYPEPTIDE: ABNORMAL
EXT PHOSPHORUS: 4.5 MG/DL (ref 2.7–4.5)
EXT PLATELETS: 386 10*3/UL (ref 160–400)
EXT POTASSIUM: 4.4 MMOL/L (ref 3.5–5.1)
EXT PROGRAF LEVEL: ABNORMAL
EXT PROLACTIN: ABNORMAL
EXT PROTEIN TOTAL: 7.4 G/DL (ref 6.5–8)
EXT PROTEIN UA: ABNORMAL
EXT PT: ABNORMAL
EXT PTH, INTACT: ABNORMAL
EXT PTT: ABNORMAL
EXT RAPAMUNE LEVEL: ABNORMAL
EXT SEROTONIN: ABNORMAL
EXT SODIUM: 137 MMOL/L (ref 136–145)
EXT SOMATOSTATIN: ABNORMAL
EXT SUBSTANCE P: ABNORMAL
EXT TRIGLYCERIDES: 82 MG/DL (ref 0–200)
EXT TRYPTASE: ABNORMAL
EXT TSH: ABNORMAL
EXT URIC ACID: ABNORMAL
EXT URINE AMYLASE U/HR: ABNORMAL
EXT URINE AMYLASE U/L: ABNORMAL
EXT VASOACTIVE INTESTINAL POLYPEPTIDE: ABNORMAL
EXT VITAMIN B12: ABNORMAL
EXT VMA 24 HR URINE: ABNORMAL
EXT WBC: 5.5 10*3/UL (ref 4.8–10.8)
NEURON SPECIFIC ENOLASE: ABNORMAL

## 2019-10-08 ENCOUNTER — PATIENT MESSAGE (OUTPATIENT)
Dept: NEUROLOGY | Facility: HOSPITAL | Age: 44
End: 2019-10-08

## 2019-10-08 ENCOUNTER — OFFICE VISIT (OUTPATIENT)
Dept: NEUROLOGY | Facility: HOSPITAL | Age: 44
End: 2019-10-08
Attending: SURGERY
Payer: COMMERCIAL

## 2019-10-08 VITALS
BODY MASS INDEX: 28.37 KG/M2 | SYSTOLIC BLOOD PRESSURE: 123 MMHG | DIASTOLIC BLOOD PRESSURE: 81 MMHG | WEIGHT: 170.31 LBS | HEART RATE: 97 BPM | HEIGHT: 65 IN | TEMPERATURE: 99 F

## 2019-10-08 DIAGNOSIS — B37.81 CANDIDA ESOPHAGITIS: ICD-10-CM

## 2019-10-08 DIAGNOSIS — E44.0 MODERATE PROTEIN-CALORIE MALNUTRITION: ICD-10-CM

## 2019-10-08 DIAGNOSIS — K91.89 COMPLICATIONS OF GASTRIC BYPASS SURGERY: ICD-10-CM
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
## 2019-10-08 DIAGNOSIS — K90.829 SGS (SHORT GUT SYNDROME): Primary | ICD-10-CM

## 2019-10-08 PROCEDURE — 99214 OFFICE O/P EST MOD 30 MIN: CPT | Performed by: SURGERY

## 2019-10-08 RX ORDER — ESOMEPRAZOLE MAGNESIUM 40 MG/1
40 CAPSULE, DELAYED RELEASE ORAL DAILY
COMMUNITY

## 2019-10-08 NOTE — PROGRESS NOTES
"NOLANETS:  [facility]  A collaboration between [facility] and [facility]      PATIENT: Parvin Corbin  MRN: [id number]  DATE: 10/8/2019    Subjective:      Chief Complaint:  New onset of nausea.  Episodic diarrhea. "My reflux is back"      Vitals: Blood pressure 123/81, pulse 97, temperature 99 °F (37.2 °C), temperature source Oral, height 5' 5" (1.651 m), weight 77.3 kg (170 lb 4.9 oz).     ECOG Score: 1 - Symptomatic but completely ambulatory    Diagnosis:   1. SGS (short gut syndrome)    2. Complications of gastric bypass surgery    3. Candida esophagitis    4. Moderate protein-calorie malnutrition         Oncologic History: NA    Interval History:  6 weeks status post reversal of gastric bypass, and proximal jejunostomy.  Complained of substernal discomfort and burning, worse at night.  Prior history of GERD Thrush improving but not resolved.  Would like gastrostomy tube removed. Complains of recent onset of nausea worse at night and nocturnal diarrhea.  Seems to occur when TPN is worked up at night.  Weakness is improving, appetite is improving slowly. Still on home TPN at night for malnutrition and short gut.     Past Medical History:  Past Medical History:   Diagnosis Date    ADD (attention deficit disorder)     Anxiety     Diabetes     Insomnia        Past Surgical History:  Past Surgical History:   Procedure Laterality Date    CHOLECYSTECTOMY      ENDOMETRIAL ABLATION      EXPLORATORY LAPAROTOMY  2019    SBR -ischemic injury    GASTRIC BYPASS  2006    PARTIAL GASTRECTOMY N/A 2019    Procedure: GASTRECTOMY, PARTIAL exploratory laparotomy, esophagogastrostomy, jejunostomy, jejunoileostomy, appenedctomy, brad gastrotstomy, liver biopsy;  Surgeon: Kirit Unger MD;  Location: [facility];  Service: General;  Laterality: N/A;    NC  DELIVERY ONLY      , Low Cervical    TUBAL " LIGATION         Family History:  History reviewed. No pertinent family history.    Allergies:  Patient has no known allergies.    Medications:  Current Outpatient Medications   Medication Sig    acetaminophen (TYLENOL) 500 MG tablet Take 1,000 mg by mouth every 6 (six) hours as needed for Pain.    apixaban (ELIQUIS) 5 mg Tab Take 5 mg by mouth 2 (two) times daily.    cholestyramine (QUESTRAN) 4 gram packet Take 1/2 packet with each meal.    cyanocobalamin, vitamin B-12, 1,000 mcg/mL Kit Inject 1,000 mcg as directed every 30 days.     esomeprazole (NEXIUM) 40 MG capsule Take 40 mg by mouth Daily.    furosemide (LASIX) 40 MG tablet Take 1 tablet (40 mg total) by mouth 2 (two) times daily.    insulin regular 100 unit/mL Inj injection Inject 10 Units into the skin 4 (four) times daily with meals and nightly.    ondansetron (ZOFRAN) 8 MG tablet Take 8 mg by mouth 2 (two) times daily as needed for Nausea.    oxyCODONE-acetaminophen (PERCOCET) 5-325 mg per tablet Take 1 tablet by mouth every 4 (four) hours as needed (pain).    ranitidine (ZANTAC) 150 MG tablet Take 1 tablet (150 mg total) by mouth 2 (two) times daily.    spironolactone (ALDACTONE) 50 MG tablet Take 1 tablet (50 mg total) by mouth once daily.    acyclovir (ZOVIRAX) 400 MG tablet Take 400 mg by mouth 2 (two) times daily as needed (fever blisters).     aluminum & magnesium hydroxide-simethicone (ANTACID ANTI-GAS) 400-400-40 mg/5 mL suspension Take 10 mLs by mouth every 6 (six) hours as needed for Indigestion.    chlordiazepoxide-clidinium 5-2.5 mg (LIBRAX) 5-2.5 mg Cap Take 1 capsule by mouth 3 (three) times daily with meals.    cyanocobalamin (VITAMIN B-12) 1000 MCG tablet Take 1,000 mcg by mouth once daily.    HYDROcodone-acetaminophen (NORCO)  mg per tablet Take 1 tablet by mouth every 6 (six) hours as needed for Pain.    iron-vit c-vit b12-folic acid (IRON-C PLUS) tablet Take 1 tablet by mouth once daily. (Patient not taking:  Reported on 10/8/2019)    pedi multivit no.91-iron fum (CHILDREN'S CHEW MULTIVIT-IRON) 15 mg iron Chew Take by mouth.    sterile water SolP 617.14 mL with parenteral amino acid 10% No.2 10 % SolP 108 g, dextrose 70% SolP 324.002 g Inject 90 mL/hr into the vein continuous. (Patient not taking: Reported on 10/8/2019)    sucralfate (CARAFATE) 100 mg/mL suspension Take 1 g by mouth 4 (four) times daily with meals and nightly.    zolpidem (AMBIEN CR) 12.5 MG CR tablet Take 12.5 mg by mouth nightly.     No current facility-administered medications for this visit.         Review of Systems   Constitutional: Positive for activity change, appetite change and fatigue.   HENT: Negative for congestion, facial swelling and sinus pressure.    Eyes: Negative for discharge.   Respiratory: Negative for chest tightness and shortness of breath.    Cardiovascular: Negative for chest pain, palpitations and leg swelling.   Gastrointestinal: Positive for diarrhea. Negative for abdominal distention and abdominal pain.   Endocrine: Negative for cold intolerance, heat intolerance and polydipsia.   Genitourinary: Negative for difficulty urinating.   Musculoskeletal: Positive for arthralgias.   Skin: Negative for color change.   Allergic/Immunologic: Negative for environmental allergies, food allergies and immunocompromised state.   Neurological: Negative for dizziness, seizures and headaches.   Hematological: Negative for adenopathy. Does not bruise/bleed easily.   Psychiatric/Behavioral: Negative for behavioral problems, confusion and dysphoric mood. The patient is not nervous/anxious.       Objective:      Physical Exam   Constitutional: She is oriented to person, place, and time. She appears well-developed and well-nourished.   HENT:   Mouth/Throat: Oropharyngeal exudate (Plaque on tongue consistent with Candida) present.   Eyes: No scleral icterus.   Neck: No JVD present. No tracheal deviation present.   Cardiovascular: Normal rate  and regular rhythm.   Pulmonary/Chest: Effort normal. She has no wheezes.   Abdominal: Soft. Bowel sounds are normal. She exhibits no distension. No hernia.   Scar healing well, ostomy site good granulation nearly closed,  Steri-Stripped closed today   Musculoskeletal: Normal range of motion.   Neurological: She is alert and oriented to person, place, and time.   Skin: Skin is warm and dry.   Psychiatric: She has a normal mood and affect. Her behavior is normal. Judgment and thought content normal.   Nursing note and vitals reviewed.    gastrostomy tube removed without incident.    Assessment:       1. SGS (short gut syndrome)    2. Complications of gastric bypass surgery    3. Candida esophagitis    4. Moderate protein-calorie malnutrition        Laboratory Data:    Neuroendocrine Labs Latest Ref Rng & Units 10/8/2019 9/23/2019 9/17/2019 9/16/2019 9/5/2019 9/1/2019 8/31/2019   FOLATE 4.0 - 24.0 ng/mL - - - - - - -   VITAMIN B12 210 - 950 pg/mL - - - - - - -   PTH, INTACT 9.0 - 77.0 pg/mL - - - - - - -   WBC 3.90 - 12.70 K/uL - - - - - 15.14(H) 24.36(H)   EXT WBC 4.8 - 10.8 10*3/ul - 6.3 - 6.6 13.2(A) - -   HGB 12.0 - 16.0 g/dL - - - - - 8.3(L) 8.6(L)   EXT HGB 11.9 - 15.3 g/dl - 9.3(A) - 9.1(A) 9.0(A) - -   HCT 37.0 - 48.5 % - - - - - 26.7(L) 28.0(L)   EXT HCT 35.0 - 45.2 % - 30.9(A) - 30.3(A) 29.4(A) - -   PLATLETS 150 - 350 K/uL - - - - - 536(H) 342   EXT PLATLETS 160 - 400 10*3/ul - 469(A) - 602(A) 842(A) - -   GLUCOSE 70 - 110 mg/dL - - - - - 155(H) 140(H)   EXT GLUCOSE 70 - 110 mg/dl - 117(A) - 114(A) 108 - -   BUN 6 - 20 mg/dL - - - - - 9 10   EXT BUN 6.0 - 19.0 mg/dl - 18.9 - 16.5 14.9 - -   CREATININE 0.5 - 1.4 mg/dL - - - - - 0.5 0.5   EXT CREATININE 0.7 - 1.6 mg/dL - 0.32(A) - 0.34(A) 0.38(A) - -    - 145 mmol/L - - - - - 137 137   EXT  - 145 mmol/L - 138 - 138 134(A) - -   K 3.5 - 5.1 mmol/L - - - - - 3.9 3.5   EXT K 3.5 - 5.1 mmol/l - 4.9 - 4.5 4.6 - -   CHLORIDE 95 - 110 mmol/L - - - - -  104 105   EXT CHLORIDE 98 - 107 mmol/l - 99 - 100 95(A) - -   CO2 23 - 29 mmol/L - - - - - 22(L) 22(L)   EXT CO2 23 - 29 mmol/l - 26 - 26 23 - -   CALCIUM 8.7 - 10.5 mg/dL - - - - - 8.7 8.3(L)   EXT CALCIUM 8.4 - 10.2 mg/dl - 9.0 - 9.0 - - -   PROTEIN, TOTAL 6.0 - 8.4 g/dL - - - - - 6.2 5.9(L)   EXT PROTEIN, TOTAL 6.5 - 8.0 g/dl - 7.1 - 6.9 - - -   PHOSPHORUS 2.7 - 4.5 mg/dL - - - - - 4.2 3.3   EXT PHORPHORUS 2.7 - 4.5 mg/dl - 3.5 - 3.7 - - -   ALBUMIN 3.5 - 5.2 g/dL - - - - - 2.0(L) 2.1(L)   EXT ALBUMIN 3.4 - 4.8 g/dl - 3.3(A) - 3.3(A) - - -   TOTAL BILIRUBIN 0.1 - 1.0 mg/dL - - - - - 0.3 0.6   EXT TOTAL BILIRUBIN 0 - 0.1 mg/dl - 0.2(A) - 0.2 - - -   ALK PHOSPHATASE 55 - 135 U/L - - - - - 225(H) 278(H)   EXT ALK PHOSPHATASE 0 - 200 mg/dl - 114 - 185(A) - - -   EXT GGT 1.2 - 2.3 g/dl - - - - - - -   SGOT (AST) 10 - 40 U/L - - - - - 14 22   EXT SGOT (AST) 0 - 31 u/l - 13 - 13 - - -   SGPT (ALT) 10 - 44 U/L - - - - - 22 27   EXT ALT 0 - 31 u/l - 18 - 18 - - -   EXT LIPASE 73 - 393 iu/l - - - - - - -   EXT AMYLASE, SERUM 25 - 115 iu/l - - - - - - -   TRIGLYCERIDES 30 - 150 mg/dL - - - - - - -   EXT TRIGLYCERIDES 0 - 200 mg/dl - - - 110 - - -   MG 1.6 - 2.6 mg/dL - - - - - 1.5(L) 1.4(L)   EXT MG 1.6 - 2.2 mg/dl - 1.97 - 1.92 - - -   Weight - 170 lbs 5 oz - 173 lbs 12 oz - - 189 lbs 2 oz -     PREALBUMIN 13  Alb 3.3  H/H 9/30    Impression:  Yeast esophagitis glossitis improving but not resolved, bile salt diarrhea versus short gut versus C diff colitis.     Short gut syndrome requiring TPN  Improving slowly. GERD.  Versus nausea from the high immuno acid concentration in the TPN.  Plan:     stool for Clostridium difficile  Resume Carafate slurry before supper 1 g  Reglan 10 mg HS for reflux  Elevated head of bed  Cholestyramine 1/2 pack it with meals  Continue Mycelex Mycelex troches PC and HS  Salt soda swish gargle spit and scrubbed tongue PC/HS      Hold TPN for 2 nights and assess response.  If symptoms improve  removed PICC line and stop TPN.     If C diff negative and diarrhea persists, consider colonoscopy and trial of Gattex for short gut.  RTC 4 weeks  KANDIS Fuchs MD, FACS  Professor of Surgery, [facility]  Neuroendocrine Surgery, Hepatic/Pancreatic & General Surgery  [address], [address]  [place]  [address]  ph. [phone]; [phone]  fax. [phone]

## 2019-10-08 NOTE — PATIENT INSTRUCTIONS
Stool Sample: stool for Clostridium difficile --- orders given to be done at home    Notes From Physician:   Hold TPN for 2 nights and assess response.  If symptoms improve removed PICC line and stop TPN.      If C diff negative and diarrhea persists, consider colonoscopy and trial of Gattex for short gut.    Elevated head of bed    Salt soda swish gargle spit and scrubbed tongue PC/HS    Return Clinic Appointment: in 4 weeks with Dr. Fuchs - appointment made    Medications:   Reglan 10 mg HS for reflux  Cholestyramine 1/2 pack it with meals  Continue Mycelex troches PC and HS

## 2019-10-09 ENCOUNTER — TELEPHONE (OUTPATIENT)
Dept: NEUROLOGY | Facility: HOSPITAL | Age: 44
End: 2019-10-09

## 2019-10-09 NOTE — TELEPHONE ENCOUNTER
RE: Nutrition Follow up call for Home TPN    Called patient regarding nausea associated with home TPN. +diarrhea but pending C-diff panel. No answer but left detailed VM with RD number for return call.

## 2019-10-10 ENCOUNTER — PATIENT MESSAGE (OUTPATIENT)
Dept: NEUROLOGY | Facility: HOSPITAL | Age: 44
End: 2019-10-10

## 2019-10-15 LAB
EXT 24 HR UR METANEPHRINE: ABNORMAL
EXT 24 HR UR NORMETANEPHRINE: ABNORMAL
EXT 24 HR UR NORMETANEPHRINE: ABNORMAL
EXT 25 HYDROXY VIT D2: ABNORMAL
EXT 25 HYDROXY VIT D3: ABNORMAL
EXT 5 HIAA 24 HR URINE: ABNORMAL
EXT 5 HIAA BLOOD: ABNORMAL
EXT ACTH: ABNORMAL
EXT AFP: ABNORMAL
EXT ALBUMIN: 3.5 G/DL (ref 3.4–4.8)
EXT ALKALINE PHOSPHATASE: 127 U/L (ref 35–104)
EXT ALT: 39 U/L (ref 0–31)
EXT AMYLASE: ABNORMAL
EXT ANTI ISLET CELL AB: ABNORMAL
EXT ANTI PARIETAL CELL AB: ABNORMAL
EXT ANTI THYROID AB: ABNORMAL
EXT AST: 22 U/L (ref 0–31)
EXT BILIRUBIN DIRECT: ABNORMAL
EXT BILIRUBIN TOTAL: 0.2 MG/DL (ref 0–1)
EXT BK VIRUS DNA QN PCR: ABNORMAL
EXT BUN: 5.9 MG/DL (ref 6–19)
EXT C PEPTIDE: ABNORMAL
EXT CA 125: ABNORMAL
EXT CA 19-9: ABNORMAL
EXT CA 27-29: ABNORMAL
EXT CALCITONIN: ABNORMAL
EXT CALCIUM: 8.8 MG/DL (ref 8.4–10.2)
EXT CEA: ABNORMAL
EXT CHLORIDE: 105 MMOL/L (ref 98–107)
EXT CHOLESTEROL: ABNORMAL
EXT CHROMOGRANIN A: ABNORMAL
EXT CO2: 26 MMOL/L (ref 23–29)
EXT CREATININE UA: ABNORMAL
EXT CREATININE: 0.48 MG/DL (ref 0.7–1.6)
EXT CYCLOSPORONE LEVEL: ABNORMAL
EXT DOPAMINE: ABNORMAL
EXT EBV DNA BY PCR: ABNORMAL
EXT EPINEPHRINE: ABNORMAL
EXT FOLATE: ABNORMAL
EXT FREE T3: ABNORMAL
EXT FREE T4: ABNORMAL
EXT FSH: ABNORMAL
EXT GASTRIN RELEASING PEPTIDE: ABNORMAL
EXT GASTRIN RELEASING PEPTIDE: ABNORMAL
EXT GASTRIN: ABNORMAL
EXT GGT: ABNORMAL
EXT GHRELIN: ABNORMAL
EXT GLUCAGON: ABNORMAL
EXT GLUCOSE: 87 MG/DL (ref 70–110)
EXT GROWTH HORMONE: ABNORMAL
EXT HCV RNA QUANT PCR: ABNORMAL
EXT HDL: ABNORMAL
EXT HEMATOCRIT: 33.4 % (ref 35–45.2)
EXT HEMOGLOBIN A1C: ABNORMAL
EXT HEMOGLOBIN: 10.4 G/DL (ref 11.9–15.3)
EXT HISTAMINE 24 HR URINE: ABNORMAL
EXT HISTAMINE: ABNORMAL
EXT IGF-1: ABNORMAL
EXT IMMUNKNOW (NON-STIMULATED): ABNORMAL
EXT IMMUNKNOW (STIMULATED): ABNORMAL
EXT INR: ABNORMAL
EXT INSULIN: ABNORMAL
EXT LANREOTIDE LEVEL: ABNORMAL
EXT LDH, TOTAL: ABNORMAL
EXT LDL CHOLESTEROL: ABNORMAL
EXT LIPASE: ABNORMAL
EXT MAGNESIUM: 1.79 MG/DL (ref 1.6–2.2)
EXT METANEPHRINE FREE PLASMA: ABNORMAL
EXT MOTILIN: ABNORMAL
EXT NEUROKININ A CAMB: ABNORMAL
EXT NEUROKININ A ISI: ABNORMAL
EXT NEUROTENSIN: ABNORMAL
EXT NOREPINEPHRINE: ABNORMAL
EXT NORMETANEPHRINE: ABNORMAL
EXT NSE: ABNORMAL
EXT OCTREOTIDE LEVEL: ABNORMAL
EXT PANCREASTATIN CAMB: ABNORMAL
EXT PANCREASTATIN ISI: ABNORMAL
EXT PANCREATIC POLYPEPTIDE: ABNORMAL
EXT PHOSPHORUS: 4 MG/DL (ref 2.7–4.5)
EXT PLATELETS: 265 10*3/UL (ref 160–400)
EXT POTASSIUM: 3.7 MMOL/L (ref 3.5–5.1)
EXT PROGRAF LEVEL: ABNORMAL
EXT PROLACTIN: ABNORMAL
EXT PROTEIN TOTAL: 6.5 G/DL (ref 6.5–8)
EXT PROTEIN UA: ABNORMAL
EXT PT: ABNORMAL
EXT PTH, INTACT: ABNORMAL
EXT PTT: ABNORMAL
EXT RAPAMUNE LEVEL: ABNORMAL
EXT SEROTONIN: ABNORMAL
EXT SODIUM: 141 MMOL/L (ref 136–145)
EXT SOMATOSTATIN: ABNORMAL
EXT SUBSTANCE P: ABNORMAL
EXT TRIGLYCERIDES: 102 MG/DL (ref 0–200)
EXT TRYPTASE: ABNORMAL
EXT TSH: ABNORMAL
EXT URIC ACID: ABNORMAL
EXT URINE AMYLASE U/HR: ABNORMAL
EXT URINE AMYLASE U/L: ABNORMAL
EXT VASOACTIVE INTESTINAL POLYPEPTIDE: ABNORMAL
EXT VITAMIN B12: ABNORMAL
EXT VMA 24 HR URINE: ABNORMAL
EXT WBC: 4 10*3/UL (ref 4.8–10.8)
NEURON SPECIFIC ENOLASE: ABNORMAL

## 2019-10-16 ENCOUNTER — PATIENT MESSAGE (OUTPATIENT)
Dept: NEUROLOGY | Facility: HOSPITAL | Age: 44
End: 2019-10-16

## 2019-10-18 ENCOUNTER — TELEPHONE (OUTPATIENT)
Dept: NEUROLOGY | Facility: HOSPITAL | Age: 44
End: 2019-10-18

## 2019-10-22 ENCOUNTER — TELEPHONE (OUTPATIENT)
Dept: NEUROLOGY | Facility: HOSPITAL | Age: 44
End: 2019-10-22

## 2019-10-22 NOTE — TELEPHONE ENCOUNTER
Spoke with Otilia peterson/ [employer] ab DC orders for TPN ans PICC line. Orders faxed to [phone]

## 2019-10-23 LAB
EXT 24 HR UR METANEPHRINE: ABNORMAL
EXT 24 HR UR NORMETANEPHRINE: ABNORMAL
EXT 24 HR UR NORMETANEPHRINE: ABNORMAL
EXT 25 HYDROXY VIT D2: ABNORMAL
EXT 25 HYDROXY VIT D3: ABNORMAL
EXT 5 HIAA 24 HR URINE: ABNORMAL
EXT 5 HIAA BLOOD: ABNORMAL
EXT ACTH: ABNORMAL
EXT AFP: ABNORMAL
EXT ALBUMIN: 3.9 G/DL (ref 3.4–4.8)
EXT ALKALINE PHOSPHATASE: 149 U/L (ref 35–104)
EXT ALT: 22 U/L (ref 0–31)
EXT AMYLASE: ABNORMAL
EXT ANTI ISLET CELL AB: ABNORMAL
EXT ANTI PARIETAL CELL AB: ABNORMAL
EXT ANTI THYROID AB: ABNORMAL
EXT AST: 16 U/L (ref 0–31)
EXT BILIRUBIN DIRECT: ABNORMAL
EXT BILIRUBIN TOTAL: 0.4 MG/DL (ref 0–1)
EXT BK VIRUS DNA QN PCR: ABNORMAL
EXT BUN: 5.9 MG/DL (ref 6–19)
EXT C PEPTIDE: ABNORMAL
EXT CA 125: ABNORMAL
EXT CA 19-9: ABNORMAL
EXT CA 27-29: ABNORMAL
EXT CALCITONIN: ABNORMAL
EXT CALCIUM: 8.7 MG/DL (ref 8.4–10.2)
EXT CEA: ABNORMAL
EXT CHLORIDE: 101 MMOL/L (ref 98–107)
EXT CHOLESTEROL: ABNORMAL
EXT CHROMOGRANIN A: ABNORMAL
EXT CO2: 25 MMOL/L (ref 23–29)
EXT CREATININE UA: ABNORMAL
EXT CREATININE: 0.46 MG/DL (ref 0.7–1.6)
EXT CYCLOSPORONE LEVEL: ABNORMAL
EXT DOPAMINE: ABNORMAL
EXT EBV DNA BY PCR: ABNORMAL
EXT EPINEPHRINE: ABNORMAL
EXT FOLATE: ABNORMAL
EXT FREE T3: ABNORMAL
EXT FREE T4: ABNORMAL
EXT FSH: ABNORMAL
EXT GASTRIN RELEASING PEPTIDE: ABNORMAL
EXT GASTRIN RELEASING PEPTIDE: ABNORMAL
EXT GASTRIN: ABNORMAL
EXT GGT: ABNORMAL
EXT GHRELIN: ABNORMAL
EXT GLUCAGON: ABNORMAL
EXT GLUCOSE: 112 MG/DL (ref 70–110)
EXT GROWTH HORMONE: ABNORMAL
EXT HCV RNA QUANT PCR: ABNORMAL
EXT HDL: ABNORMAL
EXT HEMATOCRIT: 35 % (ref 35–45.2)
EXT HEMOGLOBIN A1C: ABNORMAL
EXT HEMOGLOBIN: 11.1 G/DL (ref 11.9–15.3)
EXT HISTAMINE 24 HR URINE: ABNORMAL
EXT HISTAMINE: ABNORMAL
EXT IGF-1: ABNORMAL
EXT IMMUNKNOW (NON-STIMULATED): ABNORMAL
EXT IMMUNKNOW (STIMULATED): ABNORMAL
EXT INR: ABNORMAL
EXT INSULIN: ABNORMAL
EXT LANREOTIDE LEVEL: ABNORMAL
EXT LDH, TOTAL: ABNORMAL
EXT LDL CHOLESTEROL: ABNORMAL
EXT LIPASE: ABNORMAL
EXT MAGNESIUM: ABNORMAL
EXT METANEPHRINE FREE PLASMA: ABNORMAL
EXT MOTILIN: ABNORMAL
EXT NEUROKININ A CAMB: ABNORMAL
EXT NEUROKININ A ISI: ABNORMAL
EXT NEUROTENSIN: ABNORMAL
EXT NOREPINEPHRINE: ABNORMAL
EXT NORMETANEPHRINE: ABNORMAL
EXT NSE: ABNORMAL
EXT OCTREOTIDE LEVEL: ABNORMAL
EXT PANCREASTATIN CAMB: ABNORMAL
EXT PANCREASTATIN ISI: ABNORMAL
EXT PANCREATIC POLYPEPTIDE: ABNORMAL
EXT PHOSPHORUS: ABNORMAL
EXT PLATELETS: 239 10*3/UL (ref 160–400)
EXT POTASSIUM: 3.3 MMOL/L (ref 3.5–5.1)
EXT PROGRAF LEVEL: ABNORMAL
EXT PROLACTIN: ABNORMAL
EXT PROTEIN TOTAL: 6.8 G/DL (ref 6.5–8)
EXT PROTEIN UA: ABNORMAL
EXT PT: ABNORMAL
EXT PTH, INTACT: ABNORMAL
EXT PTT: ABNORMAL
EXT RAPAMUNE LEVEL: ABNORMAL
EXT SEROTONIN: ABNORMAL
EXT SODIUM: 140 MMOL/L (ref 136–145)
EXT SOMATOSTATIN: ABNORMAL
EXT SUBSTANCE P: ABNORMAL
EXT TRIGLYCERIDES: 135 MG/DL (ref 0–200)
EXT TRYPTASE: ABNORMAL
EXT TSH: ABNORMAL
EXT URIC ACID: ABNORMAL
EXT URINE AMYLASE U/HR: ABNORMAL
EXT URINE AMYLASE U/L: ABNORMAL
EXT VASOACTIVE INTESTINAL POLYPEPTIDE: ABNORMAL
EXT VITAMIN B12: ABNORMAL
EXT VMA 24 HR URINE: ABNORMAL
EXT WBC: 3.7 10*3/UL (ref 4.8–10.8)
NEURON SPECIFIC ENOLASE: ABNORMAL

## 2019-10-29 ENCOUNTER — TELEPHONE (OUTPATIENT)
Dept: NEUROLOGY | Facility: HOSPITAL | Age: 44
End: 2019-10-29

## 2019-10-29 LAB
EXT 24 HR UR METANEPHRINE: ABNORMAL
EXT 24 HR UR NORMETANEPHRINE: ABNORMAL
EXT 24 HR UR NORMETANEPHRINE: ABNORMAL
EXT 25 HYDROXY VIT D2: ABNORMAL
EXT 25 HYDROXY VIT D3: ABNORMAL
EXT 5 HIAA 24 HR URINE: ABNORMAL
EXT 5 HIAA BLOOD: ABNORMAL
EXT ACTH: ABNORMAL
EXT AFP: ABNORMAL
EXT ALBUMIN: 3.3 G/DL (ref 3.4–4.8)
EXT ALKALINE PHOSPHATASE: 121 U/L (ref 35–104)
EXT ALT: 14 U/L (ref 0–31)
EXT AMYLASE: ABNORMAL
EXT ANTI ISLET CELL AB: ABNORMAL
EXT ANTI PARIETAL CELL AB: ABNORMAL
EXT ANTI THYROID AB: ABNORMAL
EXT AST: 15 U/L (ref 0–31)
EXT BILIRUBIN DIRECT: ABNORMAL
EXT BILIRUBIN TOTAL: 0.3 MG/DL (ref 0–1)
EXT BK VIRUS DNA QN PCR: ABNORMAL
EXT BUN: 3.7 MG/DL (ref 6–19)
EXT C PEPTIDE: ABNORMAL
EXT CA 125: ABNORMAL
EXT CA 19-9: ABNORMAL
EXT CA 27-29: ABNORMAL
EXT CALCITONIN: ABNORMAL
EXT CALCIUM: 8.8 MG/DL (ref 8.4–10.2)
EXT CEA: ABNORMAL
EXT CHLORIDE: 109 MMOL/L (ref 98–107)
EXT CHOLESTEROL: ABNORMAL
EXT CHROMOGRANIN A: ABNORMAL
EXT CO2: 24 MMOL/L (ref 23–29)
EXT CREATININE UA: ABNORMAL
EXT CREATININE: 0.41 MG/DL (ref 0.7–1.6)
EXT CYCLOSPORONE LEVEL: ABNORMAL
EXT DOPAMINE: ABNORMAL
EXT EBV DNA BY PCR: ABNORMAL
EXT EPINEPHRINE: ABNORMAL
EXT FOLATE: ABNORMAL
EXT FREE T3: ABNORMAL
EXT FREE T4: ABNORMAL
EXT FSH: ABNORMAL
EXT GASTRIN RELEASING PEPTIDE: ABNORMAL
EXT GASTRIN RELEASING PEPTIDE: ABNORMAL
EXT GASTRIN: ABNORMAL
EXT GGT: ABNORMAL
EXT GHRELIN: ABNORMAL
EXT GLUCAGON: ABNORMAL
EXT GLUCOSE: 86 MG/DL (ref 70–110)
EXT GROWTH HORMONE: ABNORMAL
EXT HCV RNA QUANT PCR: ABNORMAL
EXT HDL: ABNORMAL
EXT HEMATOCRIT: 35.2 % (ref 35–45.2)
EXT HEMOGLOBIN A1C: ABNORMAL
EXT HEMOGLOBIN: 11.2 G/DL (ref 11.9–15.3)
EXT HISTAMINE 24 HR URINE: ABNORMAL
EXT HISTAMINE: ABNORMAL
EXT IGF-1: ABNORMAL
EXT IMMUNKNOW (NON-STIMULATED): ABNORMAL
EXT IMMUNKNOW (STIMULATED): ABNORMAL
EXT INR: ABNORMAL
EXT INSULIN: ABNORMAL
EXT LANREOTIDE LEVEL: ABNORMAL
EXT LDH, TOTAL: ABNORMAL
EXT LDL CHOLESTEROL: ABNORMAL
EXT LIPASE: ABNORMAL
EXT MAGNESIUM: 1.56 MG/DL (ref 1.6–2.2)
EXT METANEPHRINE FREE PLASMA: ABNORMAL
EXT MOTILIN: ABNORMAL
EXT NEUROKININ A CAMB: ABNORMAL
EXT NEUROKININ A ISI: ABNORMAL
EXT NEUROTENSIN: ABNORMAL
EXT NOREPINEPHRINE: ABNORMAL
EXT NORMETANEPHRINE: ABNORMAL
EXT NSE: ABNORMAL
EXT OCTREOTIDE LEVEL: ABNORMAL
EXT PANCREASTATIN CAMB: ABNORMAL
EXT PANCREASTATIN ISI: ABNORMAL
EXT PANCREATIC POLYPEPTIDE: ABNORMAL
EXT PHOSPHORUS: 3 MG/DL (ref 2.7–4.5)
EXT PLATELETS: 284 10*3/UL (ref 160–400)
EXT POTASSIUM: 3.2 MMOL/L (ref 3.5–5.1)
EXT PROGRAF LEVEL: ABNORMAL
EXT PROLACTIN: ABNORMAL
EXT PROTEIN TOTAL: 6.4 G/DL (ref 6.5–8)
EXT PROTEIN UA: ABNORMAL
EXT PT: ABNORMAL
EXT PTH, INTACT: ABNORMAL
EXT PTT: ABNORMAL
EXT RAPAMUNE LEVEL: ABNORMAL
EXT SEROTONIN: ABNORMAL
EXT SODIUM: 143 MMOL/L (ref 136–145)
EXT SOMATOSTATIN: ABNORMAL
EXT SUBSTANCE P: ABNORMAL
EXT TRIGLYCERIDES: 102 MG/DL (ref 0–200)
EXT TRYPTASE: ABNORMAL
EXT TSH: ABNORMAL
EXT URIC ACID: ABNORMAL
EXT URINE AMYLASE U/HR: ABNORMAL
EXT URINE AMYLASE U/L: ABNORMAL
EXT VASOACTIVE INTESTINAL POLYPEPTIDE: ABNORMAL
EXT VITAMIN B12: ABNORMAL
EXT VMA 24 HR URINE: ABNORMAL
EXT WBC: 5.8 10*3/UL (ref 4.8–10.8)
NEURON SPECIFIC ENOLASE: ABNORMAL
PREALB SERPL-MCNC: 14 MG/DL (ref 20–40)

## 2019-10-29 NOTE — TELEPHONE ENCOUNTER
----- Message from Kaci Esteban sent at 10/29/2019  2:58 PM CDT -----  Contact: Otilia peterson/ [employer]  [phone] ext [phone]  JPB   Otilia is requesting to speak with you to regarding removing pt's IV line and ask if office received labs. Please advise.

## 2019-11-05 ENCOUNTER — CLINICAL SUPPORT (OUTPATIENT)
Dept: NEUROLOGY | Facility: HOSPITAL | Age: 44
End: 2019-11-05
Payer: COMMERCIAL

## 2019-11-05 ENCOUNTER — OFFICE VISIT (OUTPATIENT)
Dept: NEUROLOGY | Facility: HOSPITAL | Age: 44
End: 2019-11-05
Attending: SURGERY
Payer: COMMERCIAL

## 2019-11-05 ENCOUNTER — HOSPITAL ENCOUNTER (OUTPATIENT)
Dept: RADIOLOGY | Facility: HOSPITAL | Age: 44
Discharge: HOME | End: 2019-11-05
Attending: SURGERY
Payer: COMMERCIAL

## 2019-11-05 ENCOUNTER — TELEPHONE (OUTPATIENT)
Dept: NEUROLOGY | Facility: HOSPITAL | Age: 44
End: 2019-11-05

## 2019-11-05 ENCOUNTER — PATIENT MESSAGE (OUTPATIENT)
Dept: NEUROLOGY | Facility: HOSPITAL | Age: 44
End: 2019-11-05

## 2019-11-05 VITALS
WEIGHT: 173.31 LBS | HEART RATE: 77 BPM | SYSTOLIC BLOOD PRESSURE: 125 MMHG | DIASTOLIC BLOOD PRESSURE: 85 MMHG | HEIGHT: 65 IN | TEMPERATURE: 99 F | BODY MASS INDEX: 28.87 KG/M2

## 2019-11-05 DIAGNOSIS — J90 PLEURAL EFFUSION: ICD-10-CM

## 2019-11-05 DIAGNOSIS — E44.0 MODERATE PROTEIN-CALORIE MALNUTRITION: ICD-10-CM

## 2019-11-05 DIAGNOSIS — E44.0 MODERATE PROTEIN-CALORIE MALNUTRITION: Primary | ICD-10-CM

## 2019-11-05 DIAGNOSIS — K90.829 SGS (SHORT GUT SYNDROME): ICD-10-CM

## 2019-11-05 PROCEDURE — 71046 X-RAY EXAM CHEST 2 VIEWS: CPT | Mod: 26,,, | Performed by: RADIOLOGY

## 2019-11-05 PROCEDURE — 71046 X-RAY EXAM CHEST 2 VIEWS: CPT | Mod: TC,FY

## 2019-11-05 PROCEDURE — 71046 XR CHEST PA AND LATERAL: ICD-10-PCS | Mod: 26,,, | Performed by: RADIOLOGY

## 2019-11-05 PROCEDURE — 99213 OFFICE O/P EST LOW 20 MIN: CPT | Performed by: SURGERY

## 2019-11-05 PROCEDURE — 99211 OFF/OP EST MAY X REQ PHY/QHP: CPT | Mod: 25,27

## 2019-11-05 RX ORDER — COLESEVELAM 180 1/1
650 TABLET ORAL 2 TIMES DAILY WITH MEALS
Qty: 180 TABLET | Refills: 3 | Status: CANCELLED | OUTPATIENT
Start: 2019-11-05 | End: 2020-11-04

## 2019-11-05 RX ORDER — GABAPENTIN 300 MG/1
300 CAPSULE ORAL 3 TIMES DAILY
Qty: 90 CAPSULE | Refills: 11 | Status: CANCELLED | OUTPATIENT
Start: 2019-11-05 | End: 2020-11-04

## 2019-11-05 RX ORDER — FUROSEMIDE 40 MG/1
40 TABLET ORAL 2 TIMES DAILY
Qty: 60 TABLET | Refills: 11 | Status: CANCELLED | OUTPATIENT
Start: 2019-11-05 | End: 2020-11-04

## 2019-11-05 RX ORDER — METOCLOPRAMIDE 10 MG/1
10 TABLET ORAL NIGHTLY
Qty: 30 TABLET | Refills: 1 | Status: CANCELLED | OUTPATIENT
Start: 2019-11-05

## 2019-11-05 RX ORDER — SPIRONOLACTONE 25 MG/1
25 TABLET ORAL 2 TIMES DAILY
Qty: 60 TABLET | Refills: 11 | Status: CANCELLED | OUTPATIENT
Start: 2019-11-05 | End: 2020-11-04

## 2019-11-05 RX ORDER — COLESEVELAM 180 1/1
650 TABLET ORAL DAILY
Qty: 90 TABLET | Refills: 3 | Status: CANCELLED | OUTPATIENT
Start: 2019-11-05 | End: 2020-11-04

## 2019-11-05 RX ORDER — LOPERAMIDE HYDROCHLORIDE 2 MG/1
2 CAPSULE ORAL 2 TIMES DAILY
Qty: 30 CAPSULE | Refills: 1 | Status: CANCELLED | OUTPATIENT
Start: 2019-11-05 | End: 2019-11-15

## 2019-11-05 NOTE — PATIENT INSTRUCTIONS
Chest Xray: due  today     Return Clinic Appointment: in 2 months with Dr. Fuchs- appointment made

## 2019-11-05 NOTE — PROGRESS NOTES
MRN: [id number]    Referring Physician:  KANDIS Fuchs MD    Reason for Visit: Nutrition Consult for Diarrhea due to Short Bowel Syndrome    Pertinent Social History: Patient is independent with Activities of Daily Living. Patient reports Normal Appetite at this time.     Previous Medical History:  Past Medical History:   Diagnosis Date    ADD (attention deficit disorder)     Anxiety     Diabetes     Insomnia        Previous Surgical History:  Past Surgical History:   Procedure Laterality Date    CHOLECYSTECTOMY      ENDOMETRIAL ABLATION      EXPLORATORY LAPAROTOMY  2019    SBR -ischemic injury    GASTRIC BYPASS  2006    PARTIAL GASTRECTOMY N/A 2019    Procedure: GASTRECTOMY, PARTIAL exploratory laparotomy, esophagogastrostomy, jejunostomy, jejunoileostomy, appenedctomy, brad gastrotstomy, liver biopsy;  Surgeon: Kirit Unger MD;  Location: [facility];  Service: General;  Laterality: N/A;    [place]  DELIVERY ONLY      , Low Cervical    TUBAL LIGATION         Medication:    acetaminophen (TYLENOL) 500 MG tablet, Take 1,000 mg by mouth every 6 (six) hours as needed for Pain., Disp: , Rfl:     acyclovir (ZOVIRAX) 400 MG tablet, Take 400 mg by mouth 2 (two) times daily as needed (fever blisters). , Disp: , Rfl:     apixaban (ELIQUIS) 5 mg Tab, Take 5 mg by mouth 2 (two) times daily., Disp: , Rfl:     chlordiazepoxide-clidinium 5-2.5 mg (LIBRAX) 5-2.5 mg Cap, Take 1 capsule by mouth 3 (three) times daily with meals., Disp: , Rfl:     cholestyramine (QUESTRAN) 4 gram packet, Take 1/2 packet with each meal., Disp: 270 packet, Rfl: 3    esomeprazole (NEXIUM) 40 MG capsule, Take 40 mg by mouth Daily., Disp: , Rfl:     furosemide (LASIX) 40 MG tablet, Take 1 tablet (40 mg total) by mouth 2 (two) times daily., Disp: 20 tablet, Rfl: 1    HYDROcodone-acetaminophen (NORCO)  mg per tablet, Take 1 tablet by mouth every 6 (six) hours as needed for Pain., Disp: , Rfl:  "    insulin regular 100 unit/mL Inj injection, Inject 10 Units into the skin 4 (four) times daily with meals and nightly., Disp: , Rfl:     ondansetron (ZOFRAN) 8 MG tablet, Take 8 mg by mouth 2 (two) times daily as needed for Nausea., Disp: , Rfl:     oxyCODONE-acetaminophen (PERCOCET) 5-325 mg per tablet, Take 1 tablet by mouth every 4 (four) hours as needed (pain)., Disp: 45 tablet, Rfl: 0    ranitidine (ZANTAC) 150 MG tablet, Take 1 tablet (150 mg total) by mouth 2 (two) times daily. (Patient not taking: Reported on 2019), Disp: 60 tablet, Rfl: 11    spironolactone (ALDACTONE) 50 MG tablet, Take 1 tablet (50 mg total) by mouth once daily., Disp: 20 tablet, Rfl: 1    sterile water SolP 617.14 mL with parenteral amino acid 10% No.2 10 % SolP 108 g, dextrose 70% SolP 324.002 g, Inject 90 mL/hr into the vein continuous., Disp: 1 Units, Rfl: 0    sucralfate (CARAFATE) 100 mg/mL suspension, Take 1 g by mouth 4 (four) times daily with meals and nightly., Disp: , Rfl:     zolpidem (AMBIEN CR) 12.5 MG CR tablet, Take 12.5 mg by mouth nightly., Disp: , Rfl:     Vitamin/Supplements/Herbs:     aluminum & magnesium hydroxide-simethicone (ANTACID ANTI-GAS) 400-400-40 mg/5 mL suspension, Take 10 mLs by mouth every 6 (six) hours as needed for Indigestion., Disp: , Rfl:     cyanocobalamin (VITAMIN B-12) 1000 MCG tablet, Take 1,000 mcg by mouth once daily., Disp: , Rfl:     cyanocobalamin, vitamin B-12, 1,000 mcg/mL Kit, Inject 1,000 mcg as directed every 30 days. , Disp: , Rfl:    iron-vit c-vit b12-folic acid (IRON-C PLUS) tablet, Take 1 tablet by mouth once daily., Disp: 30 tablet, Rfl: 3    pedi multivit no.91-iron fum (CHILDREN'S CHEW MULTIVIT-IRON) 15 mg iron Chew, Take by mouth., Disp: , Rfl:        Allergies: No Known Allergies      Labs: reviewed      : 1975  Age: 44 y.o.    Anthropometrics  Weight: 78.6 kg (173 lb 4.5 oz)  Height: 5' 5" (1.651 m)    Estimated body mass index is 28.84 kg/m² " "    BMI Weight Status   <16 Severe Thinness   16-16.9 Moderate Thinness   17.0-18.4 Mild Thinness   Below 18.5 Underweight   18.6-24.9 Normal/Healthy   25.0-29.9 Overweight   30.0-34.9 Obesity Class I   35.0-39.9 Obesity Class II   >40 Extreme Obesity   Class III     Ideal Weight Range for Your Height: 5'5" = 114 - 149 lbs    Weight History:  Wt Readings from Last 30 Encounters:   11/05/19 78.6 kg (173 lb 4.5 oz)   10/08/19 77.3 kg (170 lb 4.9 oz)   09/17/19 78.8 kg (173 lb 11.6 oz)   09/01/19 85.8 kg (189 lb 2.5 oz)   08/08/19 77.1 kg (170 lb)       Percentage of Weight Change when compared to Current Weight:  Current Weight:   Wt Readings from Last 1 Encounters:   11/05/19 78.6 kg (173 lb 4.5 oz)        Weight Pounds %Change Significant Severe   1 Week - - - 1-2% >2%   1 Month 77.3kg 2.8lbs 1.6% 5% >5%   3 Months 85.8kg 15.8lbs 8% 7.5% >7.5%   6 Months - - - 10% >10%   1 Year - - - 20% >20%       Estimated Nutrition Needs:   Energy Needs: 1855 (1427 MSJ x 1.3 PAL)  Protein Needs: 80 to 96 (1.0-1.2 gm Protein/kg)  Fluid Needs: 1855 (1 mL/calorie)      Malnutrition Assessment:  Energy Intake: >75% of estimated energy requirement for 1 month.  Body Fat Depletion: mild depletion of orbitals and triceps   Muscle Mass Depletion: Well Nourished moderate depletion of temples, clavicle region and interosseous muscle   Weight Loss: 8% x 3 months which reflects severe weight loss for time frame.    Fluid Accumulation: none Ankle Edema-none      Gastrointestinal Habits:  3 to 4 times per day, Diarrhea, Float, Accidents, Leakage  normal and brown and yellow Described as Type 5 to 7 on [place] Stool Form Scale.     Nutrition Symptoms: diarrhea, taste changes, weight loss and fatigue  Nutrition Status: Moderate Risk due to  weight loss 10% to 15% and 75-90% of reference weight    Nutrition History    Meal Patterns: 6 small meals daily  Beverage Intake: mainly drinks water    Food Intolerance: none voiced    Meal " preparation/shopping: self, family member, spouse   Dining out: Infrequent, 1-2 times per week      Dentition: normal dentition for age                  Difficulty chewing or swallowing? No    Exercise: Physical ability to complete tasks for meal preparation, Physical ability to self-feed, Ability to use adaptive eating devices, Remembers to eat, recalls eating, Nutrition-related instrumental activities of daily living (IADL) score, Frequency/duration and Type of physical activity-moderately active, Patient feels ready to return to work.   ECOG: (1) Restricted in physically strenuous activity, ambulatory and able to do work of light nature      Comprehension:  Patient with good  comprehension as evidenced by appropriate questions and concern expressed.    Motivation to change: high  Behavior goals: Improve diarrhea symptoms; Avoid weight loss      Nutrition Diagnosis  Nutrition Problem  Predicted suboptimal energy intake      Related to (etiology):   Post Operative procedure      Signs and Symptoms (as evidenced by):   Severe weight loss of 8% in 3 months      Interventions:  General/healthful diet- Consume a variety of cooked fruits and veggies, up to 6 per day   Protein-modified diet- Consume no less than 96 gm protein  Fiber-modified diet-Continue with Soluble Fiber in presence of diarrhea  Bioactive Substance Management:  Psyllium        Nutrition Diagnosis Status:   Improving        Recommendations:  1. Discussed use of Metamucil, soluble fiber. Patient is taking x2 caps BID. REC adding Imodium in afternoon to augment afternoon symptoms.   2. Discussed increasing soluble fiber intake in presence of diarrhea.   3. Discussed avoiding high fiber and fatty foods. Patient to start taking Welchol in place of cholestyramine.   4. Medication reviewed for interactions.   5. Discuss with PCP to Screen Vitamin D and B12 for deficiencies. Continue to take MVI with minerals.  6. RD contact information provided for  questions. RD added to Care Team.         Routed to KANDIS Fuchs MD      Consultation Time: 30 minutes.      Follow Up: 1 months.

## 2019-11-05 NOTE — PROGRESS NOTES
"NOLANETS:  [facility]  A collaboration between [facility] and [facility]      PATIENT: Parvin Corbin  MRN: [id number]  DATE: 2019    Subjective:      Chief Complaint:  Diarrhea. 3-10 day      Vitals: Blood pressure 125/85, pulse 77, temperature 99 °F (37.2 °C), temperature source Oral, height 5' 5" (1.651 m), weight 78.6 kg (173 lb 4.5 oz).     ECOG Score: 1 - Symptomatic but completely ambulatory    Diagnosis:   1. Moderate protein-calorie malnutrition    2. SGS (short gut syndrome)    3. Pleural effusion         Oncologic History: NA    Interval History:   status post reversal of gastric bypass, and proximal jejunostomy. C/o frequent diarrhea.  Prior history of GERD.  Off of TPN.  Appetite returned eating well. Main complaint is continued diarrhea.  Not taking Welchol or cholestyramine.   Complains of tingling and pain and hyperesthesia on a patch of skin on her right posterior thigh, was previously numb now tingles.  Complains of waking up every morning with a swollen left hand, arm in which she had a previous IV infiltrate and had a PICC line.  Complains of chest discomfort with deep breathing and is concerned about her pleural effusion recurring.    Past Medical History:  Past Medical History:   Diagnosis Date    ADD (attention deficit disorder)     Anxiety     Diabetes     Insomnia        Past Surgical History:  Past Surgical History:   Procedure Laterality Date    CHOLECYSTECTOMY      ENDOMETRIAL ABLATION      EXPLORATORY LAPAROTOMY  2019    SBR -ischemic injury    GASTRIC BYPASS  2006    PARTIAL GASTRECTOMY N/A 2019    Procedure: GASTRECTOMY, PARTIAL exploratory laparotomy, esophagogastrostomy, jejunostomy, jejunoileostomy, appenedctomy, brad gastrotstomy, liver biopsy;  Surgeon: Kirit Unger MD;  Location: [facility] OR;  Service: General;  Laterality: N/A;    [place]  DELIVERY ONLY  " 2003    , Low Cervical    TUBAL LIGATION         Family History:  History reviewed. No pertinent family history.    Allergies:  Patient has no known allergies.    Medications:  Current Outpatient Medications   Medication Sig    acetaminophen (TYLENOL) 500 MG tablet Take 1,000 mg by mouth every 6 (six) hours as needed for Pain.    acyclovir (ZOVIRAX) 400 MG tablet Take 400 mg by mouth 2 (two) times daily as needed (fever blisters).     aluminum & magnesium hydroxide-simethicone (ANTACID ANTI-GAS) 400-400-40 mg/5 mL suspension Take 10 mLs by mouth every 6 (six) hours as needed for Indigestion.    apixaban (ELIQUIS) 5 mg Tab Take 5 mg by mouth 2 (two) times daily.    chlordiazepoxide-clidinium 5-2.5 mg (LIBRAX) 5-2.5 mg Cap Take 1 capsule by mouth 3 (three) times daily with meals.    cholestyramine (QUESTRAN) 4 gram packet Take 1/2 packet with each meal.    cyanocobalamin (VITAMIN B-12) 1000 MCG tablet Take 1,000 mcg by mouth once daily.    cyanocobalamin, vitamin B-12, 1,000 mcg/mL Kit Inject 1,000 mcg as directed every 30 days.     esomeprazole (NEXIUM) 40 MG capsule Take 40 mg by mouth Daily.    furosemide (LASIX) 40 MG tablet Take 1 tablet (40 mg total) by mouth 2 (two) times daily.    HYDROcodone-acetaminophen (NORCO)  mg per tablet Take 1 tablet by mouth every 6 (six) hours as needed for Pain.    insulin regular 100 unit/mL Inj injection Inject 10 Units into the skin 4 (four) times daily with meals and nightly.    iron-vit c-vit b12-folic acid (IRON-C PLUS) tablet Take 1 tablet by mouth once daily.    ondansetron (ZOFRAN) 8 MG tablet Take 8 mg by mouth 2 (two) times daily as needed for Nausea.    oxyCODONE-acetaminophen (PERCOCET) 5-325 mg per tablet Take 1 tablet by mouth every 4 (four) hours as needed (pain).    pedi multivit no.91-iron fum (CHILDREN'S CHEW MULTIVIT-IRON) 15 mg iron Chew Take by mouth.    spironolactone (ALDACTONE) 50 MG tablet Take 1 tablet (50 mg total) by  mouth once daily.    sterile water SolP 617.14 mL with parenteral amino acid 10% No.2 10 % SolP 108 g, dextrose 70% SolP 324.002 g Inject 90 mL/hr into the vein continuous.    sucralfate (CARAFATE) 100 mg/mL suspension Take 1 g by mouth 4 (four) times daily with meals and nightly.    zolpidem (AMBIEN CR) 12.5 MG CR tablet Take 12.5 mg by mouth nightly.    ranitidine (ZANTAC) 150 MG tablet Take 1 tablet (150 mg total) by mouth 2 (two) times daily. (Patient not taking: Reported on 11/5/2019)     No current facility-administered medications for this visit.         Review of Systems   Constitutional: Positive for activity change, appetite change and fatigue.   HENT: Negative for congestion, facial swelling and sinus pressure.    Eyes: Negative for discharge.   Respiratory: Negative for chest tightness and shortness of breath.    Cardiovascular: Negative for chest pain, palpitations and leg swelling.   Gastrointestinal: Positive for diarrhea. Negative for abdominal distention and abdominal pain.   Endocrine: Negative for cold intolerance, heat intolerance and polydipsia.   Genitourinary: Negative for difficulty urinating.   Musculoskeletal: Positive for arthralgias.   Skin: Negative for color change.   Allergic/Immunologic: Negative for environmental allergies, food allergies and immunocompromised state.   Neurological: Negative for dizziness, seizures and headaches.   Hematological: Negative for adenopathy. Does not bruise/bleed easily.   Psychiatric/Behavioral: Negative for behavioral problems, confusion and dysphoric mood. The patient is not nervous/anxious.       Objective:      Physical Exam   Constitutional: She is oriented to person, place, and time. She appears well-developed and well-nourished.   HENT:   Mouth/Throat: Oropharyngeal exudate (Plaque on tongue consistent with Candida) present.   Eyes: No scleral icterus.   Neck: No JVD present. No tracheal deviation present.   Cardiovascular: Normal rate and  regular rhythm.   Pulmonary/Chest: Effort normal. She has no wheezes.   Abdominal: Soft. Bowel sounds are normal. She exhibits no distension. No hernia.   Musculoskeletal: Normal range of motion.   Neurological: She is alert and oriented to person, place, and time.   Skin: Skin is warm and dry.   Hyperesthesia right posterior thigh   Psychiatric: She has a normal mood and affect. Her behavior is normal. Judgment and thought content normal.   Nursing note and vitals reviewed.      Assessment:       1. Moderate protein-calorie malnutrition    2. SGS (short gut syndrome)    3. Pleural effusion        Laboratory Data:    Neuroendocrine Labs Latest Ref Rng & Units 11/5/2019 10/23/2019 10/15/2019 10/8/2019 10/7/2019 9/23/2019 9/17/2019   FOLATE 4.0 - 24.0 ng/mL - - - - - - -   VITAMIN B12 210 - 950 pg/mL - - - - - - -   PTH, INTACT 9.0 - 77.0 pg/mL - - - - - - -   WBC 3.90 - 12.70 K/uL - - - - - - -   EXT WBC 4.8 - 10.8 10*3/uL - 3.7(A) 4.0(A) - 5.5 6.3 -   HGB 12.0 - 16.0 g/dL - - - - - - -   EXT HGB 11.9 - 15.3 g/dL - 11.1(A) 10.4(A) - 11.1(A) 9.3(A) -   HCT 37.0 - 48.5 % - - - - - - -   EXT HCT 35.0 - 45.2 % - 35.0 33.4(A) - 35.9 30.9(A) -   PLATLETS 150 - 350 K/uL - - - - - - -   EXT PLATLETS 160 - 400 10*3/uL - 239 265 - 386 469(A) -   GLUCOSE 70 - 110 mg/dL - - - - - - -   EXT GLUCOSE 70 - 110 mg/dL - 112(A) 87 - 103 117(A) -   BUN 6 - 20 mg/dL - - - - - - -   EXT BUN 6.0 - 19.0 mg/dL - 5.9(A) 5.9(A) - 22.0(A) 18.9 -   CREATININE 0.5 - 1.4 mg/dL - - - - - - -   EXT CREATININE 0.7 - 1.6 mg/dL - 0.46(A) 0.48(A) - 0.46(A) 0.32(A) -    - 145 mmol/L - - - - - - -   EXT  - 145 mmol/L - 140 141 - 137 138 -   K 3.5 - 5.1 mmol/L - - - - - - -   EXT K 3.5 - 5.1 mmol/L - 3.3(A) 3.7 - 4.4 4.9 -   CHLORIDE 95 - 110 mmol/L - - - - - - -   EXT CHLORIDE 98 - 107 mmol/L - 101 105 - 100 99 -   CO2 23 - 29 mmol/L - - - - - - -   EXT CO2 23 - 29 mmol/L - 25 26 - 22(A) 26 -   CALCIUM 8.7 - 10.5 mg/dL - - - - - - -   EXT  CALCIUM 8.4 - 10.2 mg/dL - 8.7 8.8 - 9.3 9.0 -   PROTEIN, TOTAL 6.0 - 8.4 g/dL - - - - - - -   EXT PROTEIN, TOTAL 6.5 - 8.0 g/dL - 6.8 6.5 - 7.4 7.1 -   PHOSPHORUS 2.7 - 4.5 mg/dL - - - - - - -   EXT PHORPHORUS 2.7 - 4.5 mg/dL - - 4.0 - 4.5 3.5 -   ALBUMIN 3.5 - 5.2 g/dL - - - - - - -   EXT ALBUMIN 3.4 - 4.8 g/dL - 3.9 3.5 - 3.7 3.3(A) -   TOTAL BILIRUBIN 0.1 - 1.0 mg/dL - - - - - - -   EXT TOTAL BILIRUBIN 0.0 - 1.0 mg/dL - 0.4 0.2 - 0.3 0.2(A) -   ALK PHOSPHATASE 55 - 135 U/L - - - - - - -   EXT ALK PHOSPHATASE 35 - 104 U/L - 149(A) 127(A) - 177(A) 114 -   EXT GGT 1.2 - 2.3 g/dl - - - - - - -   SGOT (AST) 10 - 40 U/L - - - - - - -   EXT SGOT (AST) 0 - 31 U/L - 16 22 - 45(A) 13 -   SGPT (ALT) 10 - 44 U/L - - - - - - -   EXT ALT 0 - 31 U/L - 22 39(A) - 86(A) 18 -   EXT LIPASE 73 - 393 iu/l - - - - - - -   EXT AMYLASE, SERUM 25 - 115 iu/l - - - - - - -   TRIGLYCERIDES 30 - 150 mg/dL - - - - - - -   EXT TRIGLYCERIDES 0 - 200 mg/dL - 135 102 - 82 - -   MG 1.6 - 2.6 mg/dL - - - - - - -   EXT MG 1.6 - 2.2 mg/dL - - 1.79 - 2.00 1.97 -   Weight - 173 lbs 5 oz - - 170 lbs 5 oz - - 173 lbs 12 oz     c dif test negative.     Impression:  .   Short gut syndrome requiring TPN  Improving slowly. Neurapraxia right thigh.  History of pleural effusion.  Plan:   This continue home health no longer needed  Chest x-ray follow-up pleural effusion now  Aldactone 25 b.i.d. and Lasix 40 b.i.d. if effusion persists  Resume Carafate slurry before supper 1 g  Reglan 10 mg HS for reflux  Elevated head of bed  Welchol 650 mg a.c.  Neurontin 300 mg t.i.d.    prescription for compression stocking left hand and arm to wear at night  Proceed with colonoscopy and apply for GA TTE X  Imodium 1 every morning and bedtime.  Return in 2 months    KANDIS Fuchs MD, FACS  Professor of Surgery, [facility]  Neuroendocrine Surgery, Hepatic/Pancreatic & General Surgery  [address]  [place]  [address]  ph. [phone];  [phone]  fax. [phone]

## 2019-11-08 ENCOUNTER — TELEPHONE (OUTPATIENT)
Dept: NEUROLOGY | Facility: HOSPITAL | Age: 44
End: 2019-11-08

## 2019-11-08 ENCOUNTER — PATIENT MESSAGE (OUTPATIENT)
Dept: NEUROLOGY | Facility: HOSPITAL | Age: 44
End: 2019-11-08
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
## 2019-11-08 DIAGNOSIS — K90.829 SGS (SHORT GUT SYNDROME): Primary | ICD-10-CM

## 2019-11-08 DIAGNOSIS — E44.0 MODERATE PROTEIN-CALORIE MALNUTRITION: ICD-10-CM

## 2019-11-08 DIAGNOSIS — K91.89 COMPLICATIONS OF GASTRIC BYPASS SURGERY: ICD-10-CM

## 2019-11-08 RX ORDER — GABAPENTIN 300 MG/1
300 CAPSULE ORAL 3 TIMES DAILY
Qty: 90 CAPSULE | Refills: 11 | Status: CANCELLED | OUTPATIENT
Start: 2019-11-08 | End: 2020-11-07

## 2019-11-08 RX ORDER — FUROSEMIDE 20 MG/1
40 TABLET ORAL 2 TIMES DAILY
Qty: 120 TABLET | Refills: 11 | Status: CANCELLED | OUTPATIENT
Start: 2019-11-08 | End: 2020-11-07

## 2019-11-08 RX ORDER — LOPERAMIDE HYDROCHLORIDE 2 MG/1
2 CAPSULE ORAL 2 TIMES DAILY
Qty: 60 CAPSULE | Refills: 2 | Status: CANCELLED | OUTPATIENT
Start: 2019-11-08 | End: 2020-02-06

## 2019-11-08 RX ORDER — COLESEVELAM 180 1/1
650 TABLET ORAL
Qty: 270 TABLET | Refills: 1 | Status: CANCELLED | OUTPATIENT
Start: 2019-11-08 | End: 2020-03-09

## 2019-11-08 RX ORDER — SPIRONOLACTONE 25 MG/1
25 TABLET ORAL 2 TIMES DAILY
Qty: 60 TABLET | Refills: 11 | Status: CANCELLED | OUTPATIENT
Start: 2019-11-08 | End: 2020-11-07

## 2019-11-08 RX ORDER — METOCLOPRAMIDE 5 MG/1
10 TABLET ORAL NIGHTLY
Qty: 30 TABLET | Refills: 2 | Status: CANCELLED | OUTPATIENT
Start: 2019-11-08

## 2019-11-08 NOTE — TELEPHONE ENCOUNTER
[facility]

## 2019-11-11 DIAGNOSIS — K90.829 SGS (SHORT GUT SYNDROME): Primary | ICD-10-CM

## 2019-11-11 RX ORDER — GABAPENTIN 300 MG/1
300 CAPSULE ORAL 3 TIMES DAILY
Qty: 90 CAPSULE | Refills: 11 | Status: SHIPPED | OUTPATIENT
Start: 2019-11-11 | End: 2020-12-02

## 2019-11-11 RX ORDER — FUROSEMIDE 40 MG/1
40 TABLET ORAL 2 TIMES DAILY
Qty: 60 TABLET | Refills: 11 | Status: SHIPPED | OUTPATIENT
Start: 2019-11-11 | End: 2020-11-19

## 2019-11-11 RX ORDER — COLESEVELAM 180 1/1
650 TABLET ORAL 2 TIMES DAILY WITH MEALS
Qty: 180 TABLET | Refills: 3 | Status: SHIPPED | OUTPATIENT
Start: 2019-11-11 | End: 2020-12-02

## 2019-11-11 RX ORDER — METOCLOPRAMIDE 5 MG/1
10 TABLET ORAL NIGHTLY
Qty: 30 TABLET | Refills: 1 | Status: SHIPPED | OUTPATIENT
Start: 2019-11-11

## 2019-11-11 RX ORDER — LOPERAMIDE HYDROCHLORIDE 2 MG/1
2 CAPSULE ORAL 2 TIMES DAILY
Qty: 60 CAPSULE | Refills: 2 | Status: SHIPPED | OUTPATIENT
Start: 2019-11-11 | End: 2019-11-21

## 2019-11-11 RX ORDER — SPIRONOLACTONE 25 MG/1
25 TABLET ORAL 2 TIMES DAILY
Qty: 60 TABLET | Refills: 11 | Status: SHIPPED | OUTPATIENT
Start: 2019-11-11 | End: 2020-11-19

## 2019-11-15 ENCOUNTER — PATIENT MESSAGE (OUTPATIENT)
Dept: NEUROLOGY | Facility: HOSPITAL | Age: 44
End: 2019-11-15

## 2019-11-26 ENCOUNTER — TELEPHONE (OUTPATIENT)
Dept: NEUROLOGY | Facility: HOSPITAL | Age: 44
End: 2019-11-26

## 2019-11-26 NOTE — TELEPHONE ENCOUNTER
Spoke with Jasen Gauthier (pharmacist). Tried to fill out covermymeds and it rejected. Called [facility] prior authorization and gave the information and was denied for Gattex 5mg Kit. Due to the fact pt is no longer on TPN

## 2019-11-26 NOTE — TELEPHONE ENCOUNTER
----- Message from Kaci Esteban sent at 11/26/2019  1:38 PM CST -----  Contact: Jasen Gauthier [phone]  NASRA Aggarwal is requesting to speak with you regarding a prior authorization. Please advise.

## 2019-11-27 ENCOUNTER — TELEPHONE (OUTPATIENT)
Dept: NEUROLOGY | Facility: HOSPITAL | Age: 44
End: 2019-11-27

## 2019-11-27 NOTE — TELEPHONE ENCOUNTER
Spoke with Jasen Gauthier (pharmacist) trying to get the Gattex 5mg kit. I called him to inform him that the medication was denied. I explained that the denial was because the pt is no longer on TPN. He said that he was going to see if he can call and get it approved.

## 2020-01-06 NOTE — PROGRESS NOTES
"NOLANETS:  [facility]  A collaboration between [facility] and [facility]      PATIENT: Parvin Corbin  MRN: [id number]  DATE: 2020    Subjective:      Chief Complaint: 2 month follow visit with Chest xray    Vitals: Blood pressure (!) 143/45, pulse 82, temperature 99 °F (37.2 °C), temperature source Oral, height 5' 5" (1.651 m), weight 81.8 kg (180 lb 5.4 oz).     ECOG Score: 1 - Symptomatic but completely ambulatory    Diagnosis:   1. Complications of gastric bypass surgery    2. Mild protein-calorie malnutrition         Oncologic History: NA    Interval History:   status post reversal of gastric bypass, and proximal jejunostomy. Has 125 cm small bowel remaining after internal hernia with infarction, plus IC valve remains. C/o frequent diarrhea but improving. Weight stable.  Prior history of GERD.  Off of TPN.  Appetite returned eating well. Main complaint is continued diarrhea episodic   Complains of waking up every morning with a swollen left hand, arm in which she had a previous IV infiltrate and had a PICC line.  Back to work part time.    Past Medical History:  Past Medical History:   Diagnosis Date    ADD (attention deficit disorder)     Anxiety     Diabetes     Insomnia        Past Surgical History:  Past Surgical History:   Procedure Laterality Date    CHOLECYSTECTOMY      ENDOMETRIAL ABLATION      EXPLORATORY LAPAROTOMY  2019    SBR -ischemic injury    GASTRIC BYPASS      PARTIAL GASTRECTOMY N/A 2019    Procedure: GASTRECTOMY, PARTIAL exploratory laparotomy, esophagogastrostomy, jejunostomy, jejunoileostomy, appenedctomy, brad gastrotstomy, liver biopsy;  Surgeon: Kirit Unger MD;  Location: [facility];  Service: General;  Laterality: N/A;    [place]  DELIVERY ONLY      , Low Cervical    TUBAL LIGATION         Family History:  History reviewed. No pertinent family " history.    Allergies:  Patient has no known allergies.    Medications:  Current Outpatient Medications   Medication Sig    apixaban (ELIQUIS) 5 mg Tab Take 5 mg by mouth 2 (two) times daily.    chlordiazepoxide-clidinium 5-2.5 mg (LIBRAX) 5-2.5 mg Cap Take 1 capsule by mouth 3 (three) times daily with meals.    colesevelam (WELCHOL) 625 mg tablet Take 1 tablet (625 mg total) by mouth 2 (two) times daily with meals.    esomeprazole (NEXIUM) 40 MG capsule Take 40 mg by mouth Daily.    furosemide (LASIX) 40 MG tablet Take 1 tablet (40 mg total) by mouth 2 (two) times daily.    gabapentin (NEURONTIN) 300 MG capsule Take 1 capsule (300 mg total) by mouth 3 (three) times daily.    Lactobacillus rhamnosus GG (CULTURELLE) 10 billion cell capsule Take 1 capsule by mouth once daily.    loperamide (IMODIUM) 2 mg capsule Take 2 mg by mouth 4 (four) times daily as needed for Diarrhea.    melatonin 10 mg TbMP Take by mouth.    psyllium (METAMUCIL) packet Take 1 packet by mouth once daily.    spironolactone (ALDACTONE) 25 MG tablet Take 1 tablet (25 mg total) by mouth 2 (two) times daily.    acetaminophen (TYLENOL) 500 MG tablet Take 1,000 mg by mouth every 6 (six) hours as needed for Pain.    acyclovir (ZOVIRAX) 400 MG tablet Take 400 mg by mouth 2 (two) times daily as needed (fever blisters).     aluminum & magnesium hydroxide-simethicone (ANTACID ANTI-GAS) 400-400-40 mg/5 mL suspension Take 10 mLs by mouth every 6 (six) hours as needed for Indigestion.    cyanocobalamin (VITAMIN B-12) 1000 MCG tablet Take 1,000 mcg by mouth once daily.    cyanocobalamin, vitamin B-12, 1,000 mcg/mL Kit Inject 1,000 mcg as directed every 30 days.     HYDROcodone-acetaminophen (NORCO)  mg per tablet Take 1 tablet by mouth every 6 (six) hours as needed for Pain.    insulin regular 100 unit/mL Inj injection Inject 10 Units into the skin 4 (four) times daily with meals and nightly.    iron-vit c-vit b12-folic acid (IRON-C  PLUS) tablet Take 1 tablet by mouth once daily. (Patient not taking: Reported on 1/7/2020)    metoclopramide HCl (REGLAN) 5 MG tablet Take 2 tablets (10 mg total) by mouth every evening. (Patient not taking: Reported on 1/7/2020)    ondansetron (ZOFRAN) 8 MG tablet Take 8 mg by mouth 2 (two) times daily as needed for Nausea.    oxyCODONE-acetaminophen (PERCOCET) 5-325 mg per tablet Take 1 tablet by mouth every 4 (four) hours as needed (pain). (Patient not taking: Reported on 1/7/2020)    pedi multivit no.91-iron fum (CHILDREN'S CHEW MULTIVIT-IRON) 15 mg iron Chew Take by mouth.    ranitidine (ZANTAC) 150 MG tablet Take 1 tablet (150 mg total) by mouth 2 (two) times daily. (Patient not taking: Reported on 11/5/2019)    sterile water SolP 617.14 mL with parenteral amino acid 10% No.2 10 % SolP 108 g, dextrose 70% SolP 324.002 g Inject 90 mL/hr into the vein continuous. (Patient not taking: Reported on 1/7/2020)    sucralfate (CARAFATE) 100 mg/mL suspension Take 1 g by mouth 4 (four) times daily with meals and nightly.    zolpidem (AMBIEN CR) 12.5 MG CR tablet Take 12.5 mg by mouth nightly.     No current facility-administered medications for this visit.         Review of Systems   Constitutional: Positive for activity change, appetite change and fatigue.   HENT: Negative for congestion, facial swelling and sinus pressure.    Eyes: Negative for discharge.   Respiratory: Negative for chest tightness and shortness of breath.    Cardiovascular: Negative for chest pain, palpitations and leg swelling.   Gastrointestinal: Positive for diarrhea. Negative for abdominal distention and abdominal pain.   Endocrine: Negative for cold intolerance, heat intolerance and polydipsia.   Genitourinary: Negative for difficulty urinating.   Musculoskeletal: Positive for arthralgias.   Skin: Negative for color change.   Allergic/Immunologic: Negative for environmental allergies, food allergies and immunocompromised state.    Neurological: Negative for dizziness, seizures and headaches.   Hematological: Negative for adenopathy. Does not bruise/bleed easily.   Psychiatric/Behavioral: Negative for behavioral problems, confusion and dysphoric mood. The patient is not nervous/anxious.       Objective:      Physical Exam   Constitutional: She is oriented to person, place, and time. She appears well-developed and well-nourished.   HENT:   Mouth/Throat: Oropharyngeal exudate (Plaque on tongue consistent with Candida) present.   Eyes: No scleral icterus.   Neck: No JVD present. No tracheal deviation present.   Cardiovascular: Normal rate and regular rhythm.   Pulmonary/Chest: Effort normal. She has no wheezes.   Abdominal: Soft. Bowel sounds are normal. She exhibits no distension. No hernia.   Musculoskeletal: Normal range of motion.   Neurological: She is alert and oriented to person, place, and time.   Skin: Skin is warm and dry.   Hyperesthesia right posterior thigh   Psychiatric: She has a normal mood and affect. Her behavior is normal. Judgment and thought content normal.   Nursing note and vitals reviewed.      Assessment:       1. Complications of gastric bypass surgery    2. Mild protein-calorie malnutrition        Laboratory Data:    Neuroendocrine Labs Latest Ref Rng & Units 1/7/2020 11/5/2019 10/29/2019 10/23/2019 10/15/2019 10/8/2019 10/7/2019   FOLATE 4.0 - 24.0 ng/mL - - - - - - -   VITAMIN B12 210 - 950 pg/mL - - - - - - -   PTH, INTACT 9.0 - 77.0 pg/mL - - - - - - -   WBC 3.90 - 12.70 K/uL - - - - - - -   EXT WBC 4.8 - 10.8 10*3/ul - - 5.8 3.7(A) 4.0(A) - 5.5   HGB 12.0 - 16.0 g/dL - - - - - - -   EXT HGB 11.9 - 15.3 g/dl - - 11.2(A) 11.1(A) 10.4(A) - 11.1(A)   HCT 37.0 - 48.5 % - - - - - - -   EXT HCT 35.0 - 45.2 % - - 35.2 35.0 33.4(A) - 35.9   PLATLETS 150 - 350 K/uL - - - - - - -   EXT PLATLETS 160 - 400 10*3/ul - - 284 239 265 - 386   GLUCOSE 70 - 110 mg/dL - - - - - - -   EXT GLUCOSE 70 - 110 mg/dl - - 86 112(A) 87 - 103    BUN 6 - 20 mg/dL - - - - - - -   EXT BUN 6.0 - 19.0 mg/dl - - 3.7(A) 5.9(A) 5.9(A) - 22.0(A)   CREATININE 0.5 - 1.4 mg/dL - - - - - - -   EXT CREATININE 0.7 - 1.6 mg/dL - - 0.41(A) 0.46(A) 0.48(A) - 0.46(A)    - 145 mmol/L - - - - - - -   EXT  - 145 mmol/L - - 143 140 141 - 137   K 3.5 - 5.1 mmol/L - - - - - - -   EXT K 3.5 - 5.1 mmol/l - - 3.2(A) 3.3(A) 3.7 - 4.4   CHLORIDE 95 - 110 mmol/L - - - - - - -   EXT CHLORIDE 98 - 107 mmol/l - - 109(A) 101 105 - 100   CO2 23 - 29 mmol/L - - - - - - -   EXT CO2 23 - 29 mmol/l - - 24 25 26 - 22(A)   CALCIUM 8.7 - 10.5 mg/dL - - - - - - -   EXT CALCIUM 8.4 - 10.2 mg/dl - - 8.8 8.7 8.8 - 9.3   PROTEIN, TOTAL 6.0 - 8.4 g/dL - - - - - - -   EXT PROTEIN, TOTAL 6.5 - 8.0 g/dl - - 6.4(A) 6.8 6.5 - 7.4   PHOSPHORUS 2.7 - 4.5 mg/dL - - - - - - -   EXT PHORPHORUS 2.7 - 4.5 mg/dl - - 3.0 - 4.0 - 4.5   ALBUMIN 3.5 - 5.2 g/dL - - - - - - -   EXT ALBUMIN 3.4 - 4.8 g/dl - - 3.3(A) 3.9 3.5 - 3.7   TOTAL BILIRUBIN 0.1 - 1.0 mg/dL - - - - - - -   EXT TOTAL BILIRUBIN 0 - 1.0 mg/dl - - 0.3 0.4 0.2 - 0.3   ALK PHOSPHATASE 55 - 135 U/L - - - - - - -   EXT ALK PHOSPHATASE 35 - 104 u/l - - 121(A) 149(A) 127(A) - 177(A)   EXT GGT 1.2 - 2.3 g/dl - - - - - - -   SGOT (AST) 10 - 40 U/L - - - - - - -   EXT SGOT (AST) 0 - 31 u/l - - 15 16 22 - 45(A)   SGPT (ALT) 10 - 44 U/L - - - - - - -   EXT ALT 0 - 31 u/l - - 14 22 39(A) - 86(A)   EXT LIPASE 73 - 393 iu/l - - - - - - -   EXT AMYLASE, SERUM 25 - 115 iu/l - - - - - - -   TRIGLYCERIDES 30 - 150 mg/dL - - - - - - -   EXT TRIGLYCERIDES 0 - 200 mg/dl - - 102 135 102 - 82   MG 1.6 - 2.6 mg/dL - - - - - - -   EXT MG 1.6 - 2.2 mg/dl - - 1.56(A) - 1.79 - 2.00   Weight - 180 lbs 5 oz 173 lbs 5 oz - - - 170 lbs 5 oz -     Scans:      XR CHEST PA AND LATERAL 11/5/19    CLINICAL HISTORY:  . Moderate protein-calorie malnutrition    TECHNIQUE:  Single frontal portable view of the chest was performed.    COMPARISON:  08/28/2019    FINDINGS:  Interval  clearance of the patient's left lung zone consolidation.  Residual bandlike atelectasis in the left lower lung field.  There is residual blunting of the left costophrenic angle, presumably pleural thickening/scarring.  The right lung is clear.  No large pleural effusion.  No pneumothorax.    The cardiac silhouette is normal in size. The hilar and mediastinal contours are unremarkable.    Bones are intact.  Interval removal of left-sided PICC line.  Right upper quadrant surgical clips noted.      Impression       Interval improvement with only a small amount of residual left basilar bandlike atelectasis and left costophrenic angle suspected pleural thickening/scarring..          Impression:  Steady improvement, left arm lymphedema secondary to long-term PICC line. Diarrhea improving slowly. Malnutrition correcting        Plan:   Increase Imodium to 2 tablets twice daily  Increase Welchol to 2 tablets twice daily  Prescription for compression stocking left arm  Refill home prescriptions  RTC 6 months or p.r.n.            ADRIEN. Corona Fuchs MD, FACS  Professor of Surgery, [facility]  Neuroendocrine Surgery, Hepatic/Pancreatic & General Surgery  [address], [address]  [place]  [address]  ph. [phone]; [phone]  fax. [phone]

## 2020-01-07 ENCOUNTER — OFFICE VISIT (OUTPATIENT)
Dept: NEUROLOGY | Facility: HOSPITAL | Age: 45
End: 2020-01-07
Attending: SURGERY
Payer: COMMERCIAL

## 2020-01-07 VITALS
HEIGHT: 65 IN | SYSTOLIC BLOOD PRESSURE: 143 MMHG | TEMPERATURE: 99 F | BODY MASS INDEX: 30.04 KG/M2 | WEIGHT: 180.31 LBS | DIASTOLIC BLOOD PRESSURE: 45 MMHG | HEART RATE: 82 BPM
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
## 2020-01-07 DIAGNOSIS — K91.89 COMPLICATIONS OF GASTRIC BYPASS SURGERY: Primary | ICD-10-CM

## 2020-01-07 DIAGNOSIS — E44.1 MILD PROTEIN-CALORIE MALNUTRITION: ICD-10-CM

## 2020-01-07 LAB
EXT 24 HR UR METANEPHRINE: ABNORMAL
EXT 24 HR UR NORMETANEPHRINE: ABNORMAL
EXT 24 HR UR NORMETANEPHRINE: ABNORMAL
EXT 25 HYDROXY VIT D2: ABNORMAL
EXT 25 HYDROXY VIT D3: ABNORMAL
EXT 5 HIAA 24 HR URINE: ABNORMAL
EXT 5 HIAA BLOOD: ABNORMAL
EXT ACTH: ABNORMAL
EXT AFP: ABNORMAL
EXT ALBUMIN: 3.9 G/DL (ref 3.6–5.1)
EXT ALKALINE PHOSPHATASE: 95 U/L (ref 33–115)
EXT ALT: 15 U/L (ref 6–29)
EXT AMYLASE: ABNORMAL
EXT ANTI ISLET CELL AB: ABNORMAL
EXT ANTI PARIETAL CELL AB: ABNORMAL
EXT ANTI THYROID AB: ABNORMAL
EXT AST: 16 U/L (ref 10–35)
EXT BILIRUBIN DIRECT: ABNORMAL
EXT BILIRUBIN TOTAL: 0.4 MG/DL (ref 0.2–1.2)
EXT BK VIRUS DNA QN PCR: ABNORMAL
EXT BUN: 4 MG/DL (ref 7–25)
EXT C PEPTIDE: ABNORMAL
EXT CA 125: ABNORMAL
EXT CA 19-9: ABNORMAL
EXT CA 27-29: ABNORMAL
EXT CALCITONIN: ABNORMAL
EXT CALCIUM: 8.4 MG/DL (ref 8.6–10.2)
EXT CEA: ABNORMAL
EXT CHLORIDE: 103 MMOL/L (ref 98–110)
EXT CHOLESTEROL: ABNORMAL
EXT CHROMOGRANIN A: ABNORMAL
EXT CO2: 28 MMOL/L (ref 20–32)
EXT CREATININE UA: ABNORMAL
EXT CREATININE: 0.51 MG/DL (ref 0.5–1.1)
EXT CYCLOSPORONE LEVEL: ABNORMAL
EXT DOPAMINE: ABNORMAL
EXT EBV DNA BY PCR: ABNORMAL
EXT EPINEPHRINE: ABNORMAL
EXT FOLATE: ABNORMAL
EXT FREE T3: ABNORMAL
EXT FREE T4: ABNORMAL
EXT FSH: ABNORMAL
EXT GASTRIN RELEASING PEPTIDE: ABNORMAL
EXT GASTRIN RELEASING PEPTIDE: ABNORMAL
EXT GASTRIN: ABNORMAL
EXT GGT: ABNORMAL
EXT GHRELIN: ABNORMAL
EXT GLUCAGON: ABNORMAL
EXT GLUCOSE: 87 MG/DL (ref 65–99)
EXT GROWTH HORMONE: ABNORMAL
EXT HCV RNA QUANT PCR: ABNORMAL
EXT HDL: ABNORMAL
EXT HEMATOCRIT: 36.4 % (ref 35–45)
EXT HEMOGLOBIN A1C: ABNORMAL
EXT HEMOGLOBIN: 11.7 G/DL (ref 11.7–15.5)
EXT HISTAMINE 24 HR URINE: ABNORMAL
EXT HISTAMINE: ABNORMAL
EXT IGF-1: ABNORMAL
EXT IMMUNKNOW (NON-STIMULATED): ABNORMAL
EXT IMMUNKNOW (STIMULATED): ABNORMAL
EXT INR: ABNORMAL
EXT INSULIN: ABNORMAL
EXT LANREOTIDE LEVEL: ABNORMAL
EXT LDH, TOTAL: ABNORMAL
EXT LDL CHOLESTEROL: ABNORMAL
EXT LIPASE: ABNORMAL
EXT MAGNESIUM: ABNORMAL
EXT METANEPHRINE FREE PLASMA: ABNORMAL
EXT MOTILIN: ABNORMAL
EXT NEUROKININ A CAMB: ABNORMAL
EXT NEUROKININ A ISI: ABNORMAL
EXT NEUROTENSIN: ABNORMAL
EXT NOREPINEPHRINE: ABNORMAL
EXT NORMETANEPHRINE: ABNORMAL
EXT NSE: ABNORMAL
EXT OCTREOTIDE LEVEL: ABNORMAL
EXT PANCREASTATIN CAMB: ABNORMAL
EXT PANCREASTATIN ISI: ABNORMAL
EXT PANCREATIC POLYPEPTIDE: ABNORMAL
EXT PHOSPHORUS: ABNORMAL
EXT PLATELETS: 322 1000/UL (ref 140–400)
EXT POTASSIUM: 3.2 MMOL/L (ref 3.5–5.3)
EXT PROGRAF LEVEL: ABNORMAL
EXT PROLACTIN: ABNORMAL
EXT PROTEIN TOTAL: 6.3 G/DL (ref 6.1–8.1)
EXT PROTEIN UA: ABNORMAL
EXT PT: ABNORMAL
EXT PTH, INTACT: ABNORMAL
EXT PTT: ABNORMAL
EXT RAPAMUNE LEVEL: ABNORMAL
EXT SEROTONIN: ABNORMAL
EXT SODIUM: 139 MMOL/L (ref 135–146)
EXT SOMATOSTATIN: ABNORMAL
EXT SUBSTANCE P: ABNORMAL
EXT TRIGLYCERIDES: ABNORMAL
EXT TRYPTASE: ABNORMAL
EXT TSH: ABNORMAL
EXT URIC ACID: ABNORMAL
EXT URINE AMYLASE U/HR: ABNORMAL
EXT URINE AMYLASE U/L: ABNORMAL
EXT VASOACTIVE INTESTINAL POLYPEPTIDE: ABNORMAL
EXT VITAMIN B12: ABNORMAL
EXT VMA 24 HR URINE: ABNORMAL
EXT WBC: 7.1 1000/UL (ref 3.8–10.8)
NEURON SPECIFIC ENOLASE: ABNORMAL

## 2020-01-07 PROCEDURE — 99214 OFFICE O/P EST MOD 30 MIN: CPT | Performed by: SURGERY

## 2020-01-07 RX ORDER — LOPERAMIDE HYDROCHLORIDE 2 MG/1
2 CAPSULE ORAL 4 TIMES DAILY PRN
COMMUNITY

## 2020-07-02 NOTE — PROGRESS NOTES
"NOLANETS:  [facility]  A collaboration between [facility] and [facility]      PATIENT: Parvin Corbin  MRN: [id number]  DATE: 2020    Subjective:      Chief Complaint: 6 month follow up    Vitals: Blood pressure 132/82, pulse 81, height 5' 5" (1.651 m), weight 84.9 kg (187 lb 4.5 oz).     ECOG Score: 1 - Symptomatic but completely ambulatory    Diagnosis:   1. Complications of gastric bypass surgery    2. SGS (short gut syndrome)    3. Malnutrition compromising bodily function         Oncologic History: NA    Interval History:   status post reversal of gastric bypass, and proximal jejunostomy. Has 125 cm small bowel remaining after internal hernia with infarction, plus IC valve remains. C/o frequent diarrhea but improving. Weight stable.  Prior history of GERD.  Off of TPN.  Still having 4-6 bowel movements per day.  Complains of easily fractured teeth, sciatica, neuropathy in hands.   Appetite returned.  No nausea vomiting.  Occasional abdominal pain. Main complaint is continued diarrhea episodic   Complains of waking up every morning with a swollen left hand, arm in which she had a previous IV infiltrate and had a PICC line.  Back to work part time.    Past Medical History:  Past Medical History:   Diagnosis Date    ADD (attention deficit disorder)     Anxiety     Diabetes     Insomnia        Past Surgical History:  Past Surgical History:   Procedure Laterality Date    CHOLECYSTECTOMY      ENDOMETRIAL ABLATION      EXPLORATORY LAPAROTOMY  2019    SBR -ischemic injury    GASTRIC BYPASS  2006    PARTIAL GASTRECTOMY N/A 2019    Procedure: GASTRECTOMY, PARTIAL exploratory laparotomy, esophagogastrostomy, jejunostomy, jejunoileostomy, appenedctomy, brad gastrotstomy, liver biopsy;  Surgeon: Kirit Unger MD;  Location: [facility];  Service: General;  Laterality: N/A;    [place]  DELIVERY ONLY  " 2003    , Low Cervical    TUBAL LIGATION         Family History:  History reviewed. No pertinent family history.    Allergies:  Patient has no known allergies.    Medications:  Current Outpatient Medications   Medication Sig    acetaminophen (TYLENOL) 500 MG tablet Take 1,000 mg by mouth every 6 (six) hours as needed for Pain.    acyclovir (ZOVIRAX) 400 MG tablet Take 400 mg by mouth 2 (two) times daily as needed (fever blisters).     aluminum & magnesium hydroxide-simethicone (ANTACID ANTI-GAS) 400-400-40 mg/5 mL suspension Take 10 mLs by mouth every 6 (six) hours as needed for Indigestion.    apixaban (ELIQUIS) 5 mg Tab Take 5 mg by mouth 2 (two) times daily.    chlordiazepoxide-clidinium 5-2.5 mg (LIBRAX) 5-2.5 mg Cap Take 1 capsule by mouth 3 (three) times daily with meals.    colesevelam (WELCHOL) 625 mg tablet Take 1 tablet (625 mg total) by mouth 2 (two) times daily with meals.    cyanocobalamin (VITAMIN B-12) 1000 MCG tablet Take 1,000 mcg by mouth once daily.    cyanocobalamin, vitamin B-12, 1,000 mcg/mL Kit Inject 1,000 mcg as directed every 30 days.    esomeprazole (NEXIUM) 40 MG capsule Take 40 mg by mouth Daily.    furosemide (LASIX) 40 MG tablet Take 1 tablet (40 mg total) by mouth 2 (two) times daily.    gabapentin (NEURONTIN) 300 MG capsule Take 1 capsule (300 mg total) by mouth 3 (three) times daily.    HYDROcodone-acetaminophen (NORCO)  mg per tablet Take 1 tablet by mouth every 6 (six) hours as needed for Pain.    insulin regular 100 unit/mL Inj injection Inject 10 Units into the skin 4 (four) times daily with meals and nightly.    iron-vit c-vit b12-folic acid (IRON-C PLUS) tablet Take 1 tablet by mouth once daily. (Patient not taking: Reported on 2020)    Lactobacillus rhamnosus GG (CULTURELLE) 10 billion cell capsule Take 1 capsule by mouth once daily.    loperamide (IMODIUM) 2 mg capsule Take 2 mg by mouth 4 (four) times daily as needed for Diarrhea.     melatonin 10 mg TbMP Take by mouth.    metoclopramide HCl (REGLAN) 5 MG tablet Take 2 tablets (10 mg total) by mouth every evening. (Patient not taking: Reported on 1/7/2020)    ondansetron (ZOFRAN) 8 MG tablet Take 8 mg by mouth 2 (two) times daily as needed for Nausea.    oxyCODONE-acetaminophen (PERCOCET) 5-325 mg per tablet Take 1 tablet by mouth every 4 (four) hours as needed (pain). (Patient not taking: Reported on 1/7/2020)    pedi multivit no.91-iron fum (CHILDREN'S CHEW MULTIVIT-IRON) 15 mg iron Chew Take by mouth.    psyllium (METAMUCIL) packet Take 1 packet by mouth once daily.    ranitidine (ZANTAC) 150 MG tablet Take 1 tablet (150 mg total) by mouth 2 (two) times daily. (Patient not taking: Reported on 11/5/2019)    spironolactone (ALDACTONE) 25 MG tablet Take 1 tablet (25 mg total) by mouth 2 (two) times daily.    sterile water SolP 617.14 mL with parenteral amino acid 10% No.2 10 % SolP 108 g, dextrose 70% SolP 324.002 g Inject 90 mL/hr into the vein continuous. (Patient not taking: Reported on 1/7/2020)    sucralfate (CARAFATE) 100 mg/mL suspension Take 1 g by mouth 4 (four) times daily with meals and nightly.    zolpidem (AMBIEN CR) 12.5 MG CR tablet Take 12.5 mg by mouth nightly.     No current facility-administered medications for this visit.         Review of Systems   Constitutional: Positive for activity change, appetite change and fatigue.   HENT: Negative for congestion, facial swelling and sinus pressure.    Eyes: Negative for discharge.   Respiratory: Negative for chest tightness and shortness of breath.    Cardiovascular: Negative for chest pain, palpitations and leg swelling.   Gastrointestinal: Positive for diarrhea. Negative for abdominal distention and abdominal pain.   Endocrine: Negative for cold intolerance, heat intolerance and polydipsia.   Genitourinary: Negative for difficulty urinating.   Musculoskeletal: Positive for arthralgias.   Skin: Negative for color change.    Allergic/Immunologic: Negative for environmental allergies, food allergies and immunocompromised state.   Neurological: Negative for dizziness, seizures and headaches.   Hematological: Negative for adenopathy. Does not bruise/bleed easily.   Psychiatric/Behavioral: Negative for behavioral problems, confusion and dysphoric mood. The patient is not nervous/anxious.       Objective:      Physical Exam  Vitals signs and nursing note reviewed.   Constitutional:       Appearance: She is well-developed.   HENT:      Mouth/Throat:      Pharynx: Oropharyngeal exudate (Plaque on tongue consistent with Candida) present.   Eyes:      General: No scleral icterus.  Neck:      Vascular: No JVD.      Trachea: No tracheal deviation.   Cardiovascular:      Rate and Rhythm: Normal rate and regular rhythm.   Pulmonary:      Effort: Pulmonary effort is normal.      Breath sounds: No wheezing.   Abdominal:      General: Bowel sounds are normal. There is no distension.      Palpations: Abdomen is soft.      Hernia: No hernia is present.   Musculoskeletal: Normal range of motion.   Skin:     General: Skin is warm and dry.      Comments: Hyperesthesia right posterior thigh   Neurological:      Mental Status: She is alert and oriented to person, place, and time.   Psychiatric:         Behavior: Behavior normal.         Thought Content: Thought content normal.         Judgment: Judgment normal.         Assessment:       1. Complications of gastric bypass surgery    2. SGS (short gut syndrome)    3. Malnutrition compromising bodily function        Laboratory Data:    Neuroendocrine Labs Latest Ref Rng & Units 7/7/2020 1/7/2020 1/7/2020 11/5/2019 10/29/2019 10/23/2019 10/15/2019   FOLATE 4.0 - 24.0 ng/mL - - - - - - -   VITAMIN B12 210 - 950 pg/mL - - - - - - -   PTH, INTACT 9.0 - 77.0 pg/mL - - - - - - -   WBC 3.90 - 12.70 K/uL - - - - - - -   EXT WBC 3.8 - 10.8 1000/UL - 7.1 - - 5.8 3.7(A) 4.0(A)   HGB 12.0 - 16.0 g/dL - - - - - - -   EXT  HGB 11.7 - 15.5 G/DL - 11.7 - - 11.2(A) 11.1(A) 10.4(A)   HCT 37.0 - 48.5 % - - - - - - -   EXT HCT 35.0 - 45.0 % - 36.4 - - 35.2 35.0 33.4(A)   PLATLETS 150 - 350 K/uL - - - - - - -   EXT PLATLETS 140 - 400 1000/UL - 322 - - 284 239 265   GLUCOSE 70 - 110 mg/dL - - - - - - -   EXT GLUCOSE 65 - 99 MG/DL - 87 - - 86 112(A) 87   BUN 6 - 20 mg/dL - - - - - - -   EXT BUN 7 - 25 MG/DL - 4(A) - - 3.7(A) 5.9(A) 5.9(A)   CREATININE 0.5 - 1.4 mg/dL - - - - - - -   EXT CREATININE 0.50 - 1.10 mg/dL - 0.51 - - 0.41(A) 0.46(A) 0.48(A)    - 145 mmol/L - - - - - - -   EXT  - 146 mmol/L - 139 - - 143 140 141   K 3.5 - 5.1 mmol/L - - - - - - -   EXT K 3.5 - 5.3 MMOL/L - 3.2(A) - - 3.2(A) 3.3(A) 3.7   CHLORIDE 95 - 110 mmol/L - - - - - - -   EXT CHLORIDE 98 - 110 MMOL/L - 103 - - 109(A) 101 105   CO2 23 - 29 mmol/L - - - - - - -   EXT CO2 20 - 32 MMOL/L - 28 - - 24 25 26   CALCIUM 8.7 - 10.5 mg/dL - - - - - - -   EXT CALCIUM 8.6 - 10.2 MG/DL - 8.4(A) - - 8.8 8.7 8.8   PROTEIN, TOTAL 6.0 - 8.4 g/dL - - - - - - -   EXT PROTEIN, TOTAL 6.1 - 8.1 G/DL - 6.3 - - 6.4(A) 6.8 6.5   PHOSPHORUS 2.7 - 4.5 mg/dL - - - - - - -   EXT PHORPHORUS 2.7 - 4.5 mg/dl - - - - 3.0 - 4.0   ALBUMIN 3.5 - 5.2 g/dL - - - - - - -   EXT ALBUMIN 3.6 - 5.1 G/DL - 3.9 - - 3.3(A) 3.9 3.5   TOTAL BILIRUBIN 0.1 - 1.0 mg/dL - - - - - - -   EXT TOTAL BILIRUBIN 0.2 - 1.2 MG/DL - 0.4 - - 0.3 0.4 0.2   ALK PHOSPHATASE 55 - 135 U/L - - - - - - -   EXT ALK PHOSPHATASE 33 - 115 U/L - 95 - - 121(A) 149(A) 127(A)   EXT GGT 1.2 - 2.3 g/dl - - - - - - -   SGOT (AST) 10 - 40 U/L - - - - - - -   EXT SGOT (AST) 10 - 35 U/L - 16 - - 15 16 22   SGPT (ALT) 10 - 44 U/L - - - - - - -   EXT ALT 6 - 29 U/L - 15 - - 14 22 39(A)   EXT LIPASE 73 - 393 iu/l - - - - - - -   EXT AMYLASE, SERUM 25 - 115 iu/l - - - - - - -   TRIGLYCERIDES 30 - 150 mg/dL - - - - - - -   EXT TRIGLYCERIDES 0 - 200 mg/dl - - - - 102 135 102   MG 1.6 - 2.6 mg/dL - - - - - - -   EXT MG 1.6 - 2.2 mg/dl - - - -  1.56(A) - 1.79   Weight - 187 lbs 4 oz - 180 lbs 5 oz 173 lbs 5 oz - - -     Scans:      XR CHEST PA AND LATERAL 11/5/19    CLINICAL HISTORY:  . Moderate protein-calorie malnutrition    TECHNIQUE:  Single frontal portable view of the chest was performed.    COMPARISON:  08/28/2019    FINDINGS:  Interval clearance of the patient's left lung zone consolidation.  Residual bandlike atelectasis in the left lower lung field.  There is residual blunting of the left costophrenic angle, presumably pleural thickening/scarring.  The right lung is clear.  No large pleural effusion.  No pneumothorax.    The cardiac silhouette is normal in size. The hilar and mediastinal contours are unremarkable.    Bones are intact.  Interval removal of left-sided PICC line.  Right upper quadrant surgical clips noted.      Impression       Interval improvement with only a small amount of residual left basilar bandlike atelectasis and left costophrenic angle suspected pleural thickening/scarring..          Impression:  Suspect nutritional deficiency secondary to short gut and previous gastric bypass, mineral deficiency causing tooth loss and neuropathy      Plan:   CBC CMP, vitamin ACD and B12 folate levels, PTH level ,copper level, ceruloplasmin level, zinc  DEXA scan  If copper levels were low do the following:  The standard dose is 2 mg of elemental copper per day. This dose of elemental copper may be given intravenously; a commonly used regimen is 2 mg of elemental copper administered intravenously (over two hours) daily for five days and periodically thereafter. Some patients require higher doses, particularly if administered orally and in the setting of malabsorption. I for follow-up,  give 8 mg of elemental copper each day orally for a week, 6 mg for the second week, 4 mg for the third week, and 2 mg thereafter. When copper salts are used, the dosing should be adjusted to make sure that the elemental copper is at least 2 mg. Replacement  doses are empiric; periodic assessment of serum copper is essential to determine adequacy of replacement and to decide on the most appropriate long-term administration strategy  Prescription for vitamin-D 67461 units week  Calcium supplements  Follow-up with PCP and send lab results to PCP  Return in 2-3 weeks with results    KANDIS Fuchs MD, FACS  Professor of Surgery, [facility]  Neuroendocrine Surgery, Hepatic/Pancreatic & General Surgery  [address], [address]  [place]  [address]  ph. [phone]; [phone]  fax. [phone]

## 2020-07-07 ENCOUNTER — HOSPITAL ENCOUNTER (OUTPATIENT)
Dept: RADIOLOGY | Facility: HOSPITAL | Age: 45
Discharge: HOME | End: 2020-07-07
Attending: SURGERY
Payer: COMMERCIAL

## 2020-07-07 ENCOUNTER — OFFICE VISIT (OUTPATIENT)
Dept: NEUROLOGY | Facility: HOSPITAL | Age: 45
End: 2020-07-07
Attending: SURGERY
Payer: COMMERCIAL

## 2020-07-07 VITALS
SYSTOLIC BLOOD PRESSURE: 132 MMHG | HEART RATE: 81 BPM | BODY MASS INDEX: 31.2 KG/M2 | HEIGHT: 65 IN | WEIGHT: 187.25 LBS | DIASTOLIC BLOOD PRESSURE: 82 MMHG

## 2020-07-07 DIAGNOSIS — E46 MALNUTRITION COMPROMISING BODILY FUNCTION: ICD-10-CM

## 2020-07-07 DIAGNOSIS — K90.829 SGS (SHORT GUT SYNDROME): ICD-10-CM
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
## 2020-07-07 DIAGNOSIS — E46 MALNUTRITION COMPROMISING BODILY FUNCTION: Primary | ICD-10-CM

## 2020-07-07 DIAGNOSIS — K91.89 COMPLICATIONS OF GASTRIC BYPASS SURGERY: Primary | ICD-10-CM

## 2020-07-07 PROCEDURE — 77080 DEXA BONE DENSITY SPINE HIP: ICD-10-PCS | Mod: 26,,, | Performed by: RADIOLOGY

## 2020-07-07 PROCEDURE — 77080 DXA BONE DENSITY AXIAL: CPT | Mod: 26,,, | Performed by: RADIOLOGY

## 2020-07-07 PROCEDURE — 77080 DXA BONE DENSITY AXIAL: CPT | Mod: TC

## 2020-07-07 PROCEDURE — 99213 OFFICE O/P EST LOW 20 MIN: CPT | Performed by: SURGERY

## 2020-07-07 RX ORDER — ERGOCALCIFEROL 1.25 MG/1
50000 CAPSULE ORAL
Qty: 30 CAPSULE | Refills: 3 | Status: SHIPPED | OUTPATIENT
Start: 2020-07-07 | End: 2021-10-04

## 2020-07-27 NOTE — PROGRESS NOTES
NOLANETS:  [facility]  A collaboration between [facility] and [facility]      PATIENT: Parvin Corbin  MRN: [id number]  DATE: 7/28/2020    Subjective:      Chief Complaint: 2 week follow up    Vitals: There were no vitals taken for this visit.     ECOG Score: 1 - Symptomatic but completely ambulatory    Diagnosis:   1. Complications of gastric bypass surgery    2. SGS (short gut syndrome)       The patient location is:  home  The chief complaint leading to consultation is:  Follow-up    Visit type: audiovisual    Face to Face time with patient:  20  Forty minutes of total time spent on the encounter, which includes face to face time and non-face to face time preparing to see the patient (eg, review of tests), Obtaining and/or reviewing separately obtained history, Documenting clinical information in the electronic or other health record, Independently interpreting results (not separately reported) and communicating results to the patient/family/caregiver, or Care coordination (not separately reported).         Each patient to whom he or she provides medical services by telemedicine is:  (1) informed of the relationship between the physician and patient and the respective role of any other health care provider with respect to management of the patient; and (2) notified that he or she may decline to receive medical services by telemedicine and may withdraw from such care at any time.    Notes:  See below    Oncologic History: NA    Interval History:  Complains of worsening problems with mouth teeth fracturing, shifting, bone pain, and neuropathy.  Known short gut syndrome.   status post reversal of gastric bypass, and proximal jejunostomy. Has 125 cm small bowel remaining after internal hernia with infarction, plus IC valve remains. C/o frequent diarrhea but improving. Weight stable.  Prior history of GERD.  Off of TPN.  Still  having 4-6 bowel movements per day.  Complains of easily fractured teeth, sciatica, neuropathy in hands.   Appetite returned.  No nausea vomiting.  Occasional abdominal pain. Main complaint is continued diarrhea episodic   Complains of waking up every morning with a swollen left hand, arm in which she had a previous IV infiltrate and had a PICC line.  Back to work part time.    Past Medical History:  Past Medical History:   Diagnosis Date    ADD (attention deficit disorder)     Anxiety     Diabetes     Insomnia        Past Surgical History:  Past Surgical History:   Procedure Laterality Date    CHOLECYSTECTOMY      ENDOMETRIAL ABLATION      EXPLORATORY LAPAROTOMY  2019    SBR -ischemic injury    GASTRIC BYPASS  2006    PARTIAL GASTRECTOMY N/A 2019    Procedure: GASTRECTOMY, PARTIAL exploratory laparotomy, esophagogastrostomy, jejunostomy, jejunoileostomy, appenedctomy, brad gastrotstomy, liver biopsy;  Surgeon: Kirit Unger MD;  Location: [facility];  Service: General;  Laterality: N/A;    NM  DELIVERY ONLY      , Low Cervical    TUBAL LIGATION         Family History:  History reviewed. No pertinent family history.    Allergies:  Patient has no known allergies.    Medications:  Current Outpatient Medications   Medication Sig    acetaminophen (TYLENOL) 500 MG tablet Take 1,000 mg by mouth every 6 (six) hours as needed for Pain.    acyclovir (ZOVIRAX) 400 MG tablet Take 400 mg by mouth 2 (two) times daily as needed (fever blisters).     aluminum & magnesium hydroxide-simethicone (ANTACID ANTI-GAS) 400-400-40 mg/5 mL suspension Take 10 mLs by mouth every 6 (six) hours as needed for Indigestion.    apixaban (ELIQUIS) 5 mg Tab Take 5 mg by mouth 2 (two) times daily.    chlordiazepoxide-clidinium 5-2.5 mg (LIBRAX) 5-2.5 mg Cap Take 1 capsule by mouth 3 (three) times daily with meals.    colesevelam (WELCHOL) 625 mg tablet Take 1 tablet (625 mg total) by mouth 2  (two) times daily with meals.    cyanocobalamin (VITAMIN B-12) 1000 MCG tablet Take 1,000 mcg by mouth once daily.    cyanocobalamin, vitamin B-12, 1,000 mcg/mL Kit Inject 1,000 mcg as directed every 30 days.    ergocalciferol (VITAMIN D2) 50,000 unit Cap Take 1 capsule (50,000 Units total) by mouth every 7 days.    esomeprazole (NEXIUM) 40 MG capsule Take 40 mg by mouth Daily.    furosemide (LASIX) 40 MG tablet Take 1 tablet (40 mg total) by mouth 2 (two) times daily.    gabapentin (NEURONTIN) 300 MG capsule Take 1 capsule (300 mg total) by mouth 3 (three) times daily.    HYDROcodone-acetaminophen (NORCO)  mg per tablet Take 1 tablet by mouth every 6 (six) hours as needed for Pain.    insulin regular 100 unit/mL Inj injection Inject 10 Units into the skin 4 (four) times daily with meals and nightly.    iron-vit c-vit b12-folic acid (IRON-C PLUS) tablet Take 1 tablet by mouth once daily. (Patient not taking: Reported on 1/7/2020)    Lactobacillus rhamnosus GG (CULTURELLE) 10 billion cell capsule Take 1 capsule by mouth once daily.    loperamide (IMODIUM) 2 mg capsule Take 2 mg by mouth 4 (four) times daily as needed for Diarrhea.    melatonin 10 mg TbMP Take by mouth.    metoclopramide HCl (REGLAN) 5 MG tablet Take 2 tablets (10 mg total) by mouth every evening. (Patient not taking: Reported on 1/7/2020)    ondansetron (ZOFRAN) 8 MG tablet Take 8 mg by mouth 2 (two) times daily as needed for Nausea.    oxyCODONE-acetaminophen (PERCOCET) 5-325 mg per tablet Take 1 tablet by mouth every 4 (four) hours as needed (pain). (Patient not taking: Reported on 1/7/2020)    pedi multivit no.91-iron fum (CHILDREN'S CHEW MULTIVIT-IRON) 15 mg iron Chew Take by mouth.    psyllium (METAMUCIL) packet Take 1 packet by mouth once daily.    ranitidine (ZANTAC) 150 MG tablet Take 1 tablet (150 mg total) by mouth 2 (two) times daily. (Patient not taking: Reported on 11/5/2019)    spironolactone (ALDACTONE) 25 MG  tablet Take 1 tablet (25 mg total) by mouth 2 (two) times daily.    sterile water SolP 617.14 mL with parenteral amino acid 10% No.2 10 % SolP 108 g, dextrose 70% SolP 324.002 g Inject 90 mL/hr into the vein continuous. (Patient not taking: Reported on 1/7/2020)    sucralfate (CARAFATE) 100 mg/mL suspension Take 1 g by mouth 4 (four) times daily with meals and nightly.    zolpidem (AMBIEN CR) 12.5 MG CR tablet Take 12.5 mg by mouth nightly.     No current facility-administered medications for this visit.         Review of Systems   Constitutional: Positive for activity change, appetite change and fatigue.   HENT: Negative for congestion, facial swelling and sinus pressure.    Eyes: Negative for discharge.   Respiratory: Negative for chest tightness and shortness of breath.    Cardiovascular: Negative for chest pain, palpitations and leg swelling.   Gastrointestinal: Positive for diarrhea. Negative for abdominal distention and abdominal pain.   Endocrine: Negative for cold intolerance, heat intolerance and polydipsia.   Genitourinary: Negative for difficulty urinating.   Musculoskeletal: Positive for arthralgias.   Skin: Negative for color change.   Allergic/Immunologic: Negative for environmental allergies, food allergies and immunocompromised state.   Neurological: Negative for dizziness, seizures and headaches.   Hematological: Negative for adenopathy. Does not bruise/bleed easily.   Psychiatric/Behavioral: Negative for behavioral problems, confusion and dysphoric mood. The patient is not nervous/anxious.       Objective:    virtual visit.    No [id number]  Not distressed  No tachypnea        Assessment:       1. Complications of gastric bypass surgery    2. SGS (short gut syndrome)        Laboratory Data:    Neuroendocrine Labs Latest Ref Rng & Units 7/7/2020 7/7/2020 1/7/2020 1/7/2020 11/5/2019 10/29/2019 10/23/2019   FOLATE 4.0 - 24.0 ng/mL - - - - - - -   VITAMIN B12 210 - 950 pg/mL 594 - - - - - -   PTH,  INTACT 9.0 - 77.0 pg/mL - - - - - - -   WBC 3.90 - 12.70 K/uL 8.07 - - - - - -   EXT WBC 3.8 - 10.8 1000/UL - - 7.1 - - 5.8 3.7(A)   HGB 12.0 - 16.0 g/dL 12.3 - - - - - -   EXT HGB 11.7 - 15.5 G/DL - - 11.7 - - 11.2(A) 11.1(A)   HCT 37.0 - 48.5 % 37.7 - - - - - -   EXT HCT 35.0 - 45.0 % - - 36.4 - - 35.2 35.0   PLATLETS 150 - 350 K/uL 308 - - - - - -   EXT PLATLETS 140 - 400 1000/UL - - 322 - - 284 239   GLUCOSE 70 - 110 mg/dL 86 - - - - - -   EXT GLUCOSE 65 - 99 MG/DL - - 87 - - 86 112(A)   BUN 6 - 20 mg/dL 7 - - - - - -   EXT BUN 7 - 25 MG/DL - - 4(A) - - 3.7(A) 5.9(A)   CREATININE 0.5 - 1.4 mg/dL 0.7 - - - - - -   EXT CREATININE 0.50 - 1.10 mg/dL - - 0.51 - - 0.41(A) 0.46(A)    - 145 mmol/L 139 - - - - - -   EXT  - 146 mmol/L - - 139 - - 143 140   K 3.5 - 5.1 mmol/L 3.7 - - - - - -   EXT K 3.5 - 5.3 MMOL/L - - 3.2(A) - - 3.2(A) 3.3(A)   CHLORIDE 95 - 110 mmol/L 103 - - - - - -   EXT CHLORIDE 98 - 110 MMOL/L - - 103 - - 109(A) 101   CO2 23 - 29 mmol/L 28 - - - - - -   EXT CO2 20 - 32 MMOL/L - - 28 - - 24 25   CALCIUM 8.7 - 10.5 mg/dL 8.6(L) - - - - - -   EXT CALCIUM 8.6 - 10.2 MG/DL - - 8.4(A) - - 8.8 8.7   PROTEIN, TOTAL 6.0 - 8.4 g/dL 6.7 - - - - - -   EXT PROTEIN, TOTAL 6.1 - 8.1 G/DL - - 6.3 - - 6.4(A) 6.8   PHOSPHORUS 2.7 - 4.5 mg/dL - - - - - - -   EXT PHORPHORUS 2.7 - 4.5 mg/dl - - - - - 3.0 -   ALBUMIN 3.5 - 5.2 g/dL 3.9 - - - - - -   EXT ALBUMIN 3.6 - 5.1 G/DL - - 3.9 - - 3.3(A) 3.9   TOTAL BILIRUBIN 0.1 - 1.0 mg/dL 0.4 - - - - - -   EXT TOTAL BILIRUBIN 0.2 - 1.2 MG/DL - - 0.4 - - 0.3 0.4   ALK PHOSPHATASE 55 - 135 U/L 91 - - - - - -   EXT ALK PHOSPHATASE 33 - 115 U/L - - 95 - - 121(A) 149(A)   EXT GGT 1.2 - 2.3 g/dl - - - - - - -   SGOT (AST) 10 - 40 U/L 16 - - - - - -   EXT SGOT (AST) 10 - 35 U/L - - 16 - - 15 16   SGPT (ALT) 10 - 44 U/L 12 - - - - - -   EXT ALT 6 - 29 U/L - - 15 - - 14 22   EXT LIPASE 73 - 393 iu/l - - - - - - -   EXT AMYLASE, SERUM 25 - 115 iu/l - - - - - - -    TRIGLYCERIDES 30 - 150 mg/dL - - - - - - -   EXT TRIGLYCERIDES 0 - 200 mg/dl - - - - - 102 135   MG 1.6 - 2.6 mg/dL - - - - - - -   EXT MG 1.6 - 2.2 mg/dl - - - - - 1.56(A) -   Weight - - 187 lbs 4 oz - 180 lbs 5 oz 173 lbs 5 oz - -     Scans:      XR CHEST PA AND LATERAL 11/5/19    CLINICAL HISTORY:  . Moderate protein-calorie malnutrition    TECHNIQUE:  Single frontal portable view of the chest was performed.    COMPARISON:  08/28/2019    FINDINGS:  Interval clearance of the patient's left lung zone consolidation.  Residual bandlike atelectasis in the left lower lung field.  There is residual blunting of the left costophrenic angle, presumably pleural thickening/scarring.  The right lung is clear.  No large pleural effusion.  No pneumothorax.    The cardiac silhouette is normal in size. The hilar and mediastinal contours are unremarkable.    Bones are intact.  Interval removal of left-sided PICC line.  Right upper quadrant surgical clips noted.      Impression       Interval improvement with only a small amount of residual left basilar bandlike atelectasis and left costophrenic angle suspected pleural thickening/scarring..     DEXA:  The L1 to L4 vertebral bone mineral density is equal to 1.058 g/cm squared with a T score of -1.1.     The left femoral neck bone mineral density is equal to 0.793 g/cm squared with a T score of -1.8.     There is a 3.5% risk of a major osteoporotic fracture and a 0.4% risk of hip fracture in the next 10 years (FRAX).     Impression:     Lumbar spine osteopenia.     Left femoral neck osteopenia.        Impression:  Vitamin a levels are low vitamin-D and PTH levels are still pending calcium levels and low.  Osteopenia noted.  Havng problems eating, talking, due to changes in jaws and teeth, secondary to malnutrition/short gut syndrome.       Plan:   Vit D and calcium.  ADEK vitamins, mineral/vit asupplements  consult Maida for recommendations. Maida please call her.   Call [employer]  [facility] about tooth fractures/caries and malnutrition.  Send letter to insurance company explaining dental problems are due to short gut syndrome and malnutrition  Rx tincture of opium 6 drops TID.  Rx ADEK vitamins        J. Corona Fuchs MD, FACS  Professor of Surgery, [facility]  Neuroendocrine Surgery, Hepatic/Pancreatic & General Surgery  [address], [address]  [place]  [address]  ph. [phone]; [phone]  fax. [phone]

## 2020-07-28 ENCOUNTER — OFFICE VISIT (OUTPATIENT)
Dept: NEUROLOGY | Facility: HOSPITAL | Age: 45
End: 2020-07-28
Attending: SURGERY
Payer: COMMERCIAL
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
## 2020-07-28 DIAGNOSIS — K91.89 COMPLICATIONS OF GASTRIC BYPASS SURGERY: Primary | ICD-10-CM

## 2020-07-28 DIAGNOSIS — K90.829 SGS (SHORT GUT SYNDROME): ICD-10-CM

## 2020-08-04 ENCOUNTER — TELEPHONE (OUTPATIENT)
Dept: NEUROLOGY | Facility: HOSPITAL | Age: 45
End: 2020-08-04

## 2020-08-04 NOTE — TELEPHONE ENCOUNTER
"Nutrition Follow Up Call:       Back pain, neck pain, numbness in hands and feet  Teeth- breaking and chipping, cavities  Bone density showed osteopenia   6-10 none formed bowel movement a day; Nocturnal as well- taking low dose Welchol  Constant nerve pain in right thigh- sciatica  Constant "pin stick pain in hands and feet"- neuropathy  On gabapentin - would like to know if this can be increased?     She is currently taking,   Vit D 50,000 U  Calcium + D- [facility] 800 mg Ca, 600 Vit D-  BID  Chewable MVI    Capsule MVI with TE  Vitamin B12 VI and Oral     Discussed the changes below:        colesevelam (WELCHOL) 625 mg tablet, Take 1 tablet (625 mg total) by mouth 2 (two) times daily with meals., Disp: 180 tablet, Rfl: 3 - Increase to 1875 mg BID or x3 625 mg pills BID    cyanocobalamin (VITAMIN B-12) 1000 MCG tablet, Take 1,000 mcg by mouth once daily., Disp: , Rfl: - stop taking    cyanocobalamin, vitamin B-12, 1,000 mcg/mL Kit, Inject 1,000 mcg as directed every 30 days., Disp: 1 kit, Rfl: 30- continue dose- meeting 100% of needs    ergocalciferol (VITAMIN D2) 50,000 unit Cap, Take 1 capsule (50,000 Units total) by mouth every 7 days., Disp: 30 capsule, Rfl: 3 - continue as directed    gabapentin (NEURONTIN) 300 MG capsule, Take 1 capsule (300 mg total) by mouth 3 (three) times daily., Disp: 90 capsule, Rfl: 11 - She is asking if this can be increased?    loperamide (IMODIUM) 2 mg capsule, Take 2 mg by mouth 4 (four) times daily as needed for Diarrhea., Disp: , Rfl: - stop taking    ondansetron (ZOFRAN) 8 MG tablet, Take 8 mg by mouth 2 (two) times daily as needed for Nausea., Disp: , Rfl: continue to take as directed    [facility] Soluble Fiber. Take x2 caplets by mouth twice daily., Disp: , Rfl: Take x 2 caplets 3 x per day      Recommendations:   1. Tincture of Opium. Patient requesting written prescription.   2. Take 500mg Calcium Citrate + D 1000 IU three times daily, 2hrs apart from Iron, dairy, " coffee/tea  3. Take Super B-complex with at least 50mg Thiamine daily. Tingling in extremities can be a sign of deficiency.  4. Take chewable MVI Complete with  Iron - 18mg Iron twice daily. If Iron deficient will need to add Ferrous Sulfate.   5. Trial use of Enterade at a later date.   6. Avoid high sugar and high fat food and drink.

## 2020-09-04 ENCOUNTER — TELEPHONE (OUTPATIENT)
Dept: NEUROLOGY | Facility: HOSPITAL | Age: 45
End: 2020-09-04

## 2020-09-04 NOTE — TELEPHONE ENCOUNTER
Spoke with Jonathan in Pharmacy at [phone]. Tincture of Opium is not covered by the patients insurance, even with prior authorization. Patient is only covered to use Lomotil. Jonathan will fax us a copy of denial letter, and has no further questions at this time.

## 2020-09-04 NOTE — TELEPHONE ENCOUNTER
RE: Nutrition Follow Up Call     Spoke with patient. She has not started Tincture of Opium because of pharmacy issues. Requested she trial Enterade. She is agreeable. Will ask Enterade Rep to send out sample kit. She had labs drawn recently and will fax results once available.     Call time 10 minutes.
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
## 2020-10-23 DIAGNOSIS — K91.89 COMPLICATIONS OF GASTRIC BYPASS SURGERY: ICD-10-CM

## 2020-12-11 ENCOUNTER — TELEPHONE (OUTPATIENT)
Dept: NEUROLOGY | Facility: HOSPITAL | Age: 45
End: 2020-12-11

## 2021-07-06 ENCOUNTER — TELEPHONE (OUTPATIENT)
Dept: NEUROLOGY | Facility: HOSPITAL | Age: 46
End: 2021-07-06

## 2021-07-06 DIAGNOSIS — K91.89 COMPLICATIONS OF GASTRIC BYPASS SURGERY: Primary | ICD-10-CM

## 2021-07-06 DIAGNOSIS — E46 MALNUTRITION COMPROMISING BODILY FUNCTION: ICD-10-CM
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
## 2021-08-13 ENCOUNTER — HOSPITAL ENCOUNTER (OUTPATIENT)
Dept: RADIOLOGY | Facility: HOSPITAL | Age: 46
Discharge: HOME | End: 2021-08-13
Attending: SURGERY
Payer: COMMERCIAL

## 2021-08-13 ENCOUNTER — OFFICE VISIT (OUTPATIENT)
Dept: NEUROLOGY | Facility: HOSPITAL | Age: 46
End: 2021-08-13
Attending: SURGERY
Payer: COMMERCIAL

## 2021-08-13 VITALS
HEIGHT: 65 IN | DIASTOLIC BLOOD PRESSURE: 78 MMHG | BODY MASS INDEX: 32.05 KG/M2 | HEART RATE: 83 BPM | WEIGHT: 192.38 LBS | SYSTOLIC BLOOD PRESSURE: 117 MMHG | TEMPERATURE: 99 F

## 2021-08-13 DIAGNOSIS — K91.89 COMPLICATIONS OF GASTRIC BYPASS SURGERY: Primary | ICD-10-CM

## 2021-08-13 DIAGNOSIS — K90.829 SGS (SHORT GUT SYNDROME): ICD-10-CM
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
## 2021-08-13 DIAGNOSIS — K91.89 COMPLICATIONS OF GASTRIC BYPASS SURGERY: ICD-10-CM
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
## 2021-08-13 DIAGNOSIS — E46 MALNUTRITION COMPROMISING BODILY FUNCTION: ICD-10-CM

## 2021-08-13 DIAGNOSIS — K90.89 BILE SALT-INDUCED DIARRHEA: ICD-10-CM

## 2021-08-13 DIAGNOSIS — D64.9 ANEMIA, UNSPECIFIED TYPE: ICD-10-CM

## 2021-08-13 LAB
CREAT SERPL-MCNC: 0.5 MG/DL (ref 0.5–1.4)
SAMPLE: NORMAL

## 2021-08-13 PROCEDURE — A9698 NON-RAD CONTRAST MATERIALNOC: HCPCS | Performed by: SURGERY

## 2021-08-13 PROCEDURE — 99213 OFFICE O/P EST LOW 20 MIN: CPT | Performed by: SURGERY

## 2021-08-13 PROCEDURE — 74177 CT ABDOMEN PELVIS WITH CONTRAST: ICD-10-PCS | Mod: 26,,, | Performed by: RADIOLOGY

## 2021-08-13 PROCEDURE — 74177 CT ABD & PELVIS W/CONTRAST: CPT | Mod: 26,,, | Performed by: RADIOLOGY

## 2021-08-13 PROCEDURE — 74177 CT ABD & PELVIS W/CONTRAST: CPT | Mod: TC

## 2021-08-13 PROCEDURE — 25500020 PHARM REV CODE 255: Performed by: SURGERY

## 2021-08-13 RX ORDER — CHOLESTYRAMINE 4 G/9G
4 POWDER, FOR SUSPENSION ORAL 2 TIMES DAILY
Qty: 180 PACKET | Refills: 3 | Status: SHIPPED | OUTPATIENT
Start: 2021-08-13 | End: 2022-08-13

## 2021-08-13 RX ADMIN — IOHEXOL 75 ML: 350 INJECTION, SOLUTION INTRAVENOUS at 11:08

## 2021-08-13 RX ADMIN — IOHEXOL 1000 ML: 9 SOLUTION ORAL at 10:08

## 2021-08-27 ENCOUNTER — TELEPHONE (OUTPATIENT)
Dept: NEUROLOGY | Facility: HOSPITAL | Age: 46
End: 2021-08-27

## 2021-09-03 ENCOUNTER — OFFICE VISIT (OUTPATIENT)
Dept: NEUROLOGY | Facility: HOSPITAL | Age: 46
End: 2021-09-03
Attending: SURGERY
Payer: COMMERCIAL

## 2021-09-03 DIAGNOSIS — M85.852 OSTEOPENIA OF NECKS OF BOTH FEMURS: ICD-10-CM

## 2021-09-03 DIAGNOSIS — K90.89 BILE SALT-INDUCED DIARRHEA: Primary | ICD-10-CM

## 2021-09-03 DIAGNOSIS — D68.59 ANTITHROMBIN 3 DEFICIENCY: ICD-10-CM

## 2021-09-03 DIAGNOSIS — K91.89 COMPLICATIONS OF GASTRIC BYPASS SURGERY: ICD-10-CM

## 2021-09-03 DIAGNOSIS — M85.88 OSTEOPENIA OF SPINE: ICD-10-CM

## 2021-09-03 DIAGNOSIS — M85.851 OSTEOPENIA OF NECKS OF BOTH FEMURS: ICD-10-CM
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
## 2022-09-21 ENCOUNTER — TELEPHONE (OUTPATIENT)
Dept: HEMATOLOGY/ONCOLOGY | Facility: CLINIC | Age: 47
End: 2022-09-21
Payer: COMMERCIAL

## 2022-09-21 NOTE — TELEPHONE ENCOUNTER
----- Message from Maria R Carballo sent at 9/20/2022  1:08 PM CDT -----  Regarding: appt request  Contact: Pt  Type: Appointment Request    Caller is requesting an appointment     Name of Caller: Pt  Reason for appointment:  F/U   Would the patient rather a call back or a response via [facility]? Call back  Best Call Back Number: [phone]  Additional Information:  pt states that she's fatigue w/ abdominal pain

## 2023-01-25 NOTE — PLAN OF CARE
Problem: Parenteral Nutrition  Goal: Effective Intravenous Nutrition Therapy Delivery  Outcome: Ongoing (interventions implemented as appropriate)    Recommendation:   1. Initiate Clear Liquid diet when medically acceptable.   2. Continue TPN with IVFE.    Goals:  1. Pt will tolerate TPN.  2. Pt will tolerate po diet with at least 50% intake at meals.  Nutrition Goal Status: progressing towards goal  Communication of RD Recs: reviewed with RN(Skylar)         no

## (undated) DEVICE — STAPLER ENDO GIA 60MM MED THCK

## (undated) DEVICE — DRESSING AQUACEL SACRAL 9 X 9

## (undated) DEVICE — BAG BILE DRAINAGE 19OZ 9.5IN

## (undated) DEVICE — Device

## (undated) DEVICE — SEE MEDLINE ITEM 153151

## (undated) DEVICE — SUT ETHIBOND EXCEL 1 CTX 18

## (undated) DEVICE — TRAY FOLEY 16FR INFECTION CONT

## (undated) DEVICE — SEE MEDLINE ITEM 157125

## (undated) DEVICE — SUT PROLENE 3-0 30SH

## (undated) DEVICE — ADHESIVE DERMABOND ADVANCED

## (undated) DEVICE — GAUZE SPONGE 4X4 12PLY

## (undated) DEVICE — SEALER LIGASURE OPEN 5MM 23CM

## (undated) DEVICE — GLOVE PROTEXIS LTX MICRO  7.5

## (undated) DEVICE — ELECTRODE REM PLYHSV RETURN 9

## (undated) DEVICE — RELOAD 60MM ARTICULATING VAS

## (undated) DEVICE — SUT PROLENE 5-0 36IN C-1

## (undated) DEVICE — SHELL POWER SIGNIA

## (undated) DEVICE — CATH MALECOT 20FR 6EA/BX

## (undated) DEVICE — SUT PROLENE 2-0 36IN MH BLU

## (undated) DEVICE — SUT MONOCRYL 3-0 PS-1

## (undated) DEVICE — NDL HYPDRM 23X15

## (undated) DEVICE — STAPLER CIRCULAR XL SEAL 29MM

## (undated) DEVICE — KIT EVACUATOR FULL PERF 100CC

## (undated) DEVICE — MANIFOLD 4 PORT

## (undated) DEVICE — SUT ETHILON 4-0 BLK MONO

## (undated) DEVICE — SUT PROLENE 2-0 SH 36IN BLU

## (undated) DEVICE — SUT PROLENE 6-0 C-1 30IN BL

## (undated) DEVICE — KIT POWDER ABSORBABLE GELATIN

## (undated) DEVICE — SUT 1 36IN PDS II VIO MONO

## (undated) DEVICE — CATH MALECOT 16FR 6EA/BX OR CA

## (undated) DEVICE — SUPPORT ULNA NERVE PROTECTOR

## (undated) DEVICE — HEMOSTAT SURGICEL 4X8IN

## (undated) DEVICE — SUT 2 60IN PROLENE BL MONO

## (undated) DEVICE — DRESSING TEGADERM 4.4X5IN

## (undated) DEVICE — SUT 4/0 27IN PDS II VIO MO

## (undated) DEVICE — DRAPE INCISE IOBAN 2 23X23IN

## (undated) DEVICE — SUT 4/0 27IN PDS II VIO MON

## (undated) DEVICE — DRESSING TEGADERM 2X2 3/4

## (undated) DEVICE — CATH ALL PUR URTHL 20FR

## (undated) DEVICE — GAUZE SPONGE 4X4 12 PLY NS

## (undated) DEVICE — GLOVE 8 PROTEXIS PI BLUE